# Patient Record
Sex: FEMALE | Race: BLACK OR AFRICAN AMERICAN | Employment: FULL TIME | URBAN - METROPOLITAN AREA
[De-identification: names, ages, dates, MRNs, and addresses within clinical notes are randomized per-mention and may not be internally consistent; named-entity substitution may affect disease eponyms.]

---

## 2017-01-08 ENCOUNTER — APPOINTMENT (OUTPATIENT)
Dept: GENERAL RADIOLOGY | Age: 37
DRG: 392 | End: 2017-01-08
Attending: EMERGENCY MEDICINE
Payer: SELF-PAY

## 2017-01-08 ENCOUNTER — HOSPITAL ENCOUNTER (INPATIENT)
Age: 37
LOS: 2 days | Discharge: HOME | DRG: 392 | End: 2017-01-11
Attending: EMERGENCY MEDICINE | Admitting: FAMILY MEDICINE
Payer: SELF-PAY

## 2017-01-08 DIAGNOSIS — R10.13 ABDOMINAL PAIN, EPIGASTRIC: Primary | ICD-10-CM

## 2017-01-08 DIAGNOSIS — R07.89 ATYPICAL CHEST PAIN: ICD-10-CM

## 2017-01-08 DIAGNOSIS — R11.2 NAUSEA AND VOMITING, INTRACTABILITY OF VOMITING NOT SPECIFIED, UNSPECIFIED VOMITING TYPE: ICD-10-CM

## 2017-01-08 DIAGNOSIS — F10.930 WITHDRAWAL SYMPTOMS, ALCOHOL, UNCOMPLICATED (HCC): ICD-10-CM

## 2017-01-08 LAB
ALBUMIN SERPL BCP-MCNC: 4 G/DL (ref 3.5–5)
ALBUMIN/GLOB SERPL: 0.8 {RATIO} (ref 1.1–2.2)
ALP SERPL-CCNC: 128 U/L (ref 45–117)
ALT SERPL-CCNC: 43 U/L (ref 12–78)
ANION GAP BLD CALC-SCNC: 18 MMOL/L (ref 5–15)
APPEARANCE UR: ABNORMAL
AST SERPL W P-5'-P-CCNC: 73 U/L (ref 15–37)
BACTERIA URNS QL MICRO: ABNORMAL /HPF
BASOPHILS # BLD AUTO: 0 K/UL (ref 0–0.1)
BASOPHILS # BLD: 0 % (ref 0–1)
BILIRUB SERPL-MCNC: 0.5 MG/DL (ref 0.2–1)
BILIRUB UR QL CFM: NEGATIVE
BUN SERPL-MCNC: 8 MG/DL (ref 6–20)
BUN/CREAT SERPL: 13 (ref 12–20)
CALCIUM SERPL-MCNC: 9.1 MG/DL (ref 8.5–10.1)
CHLORIDE SERPL-SCNC: 100 MMOL/L (ref 97–108)
CK SERPL-CCNC: 105 U/L (ref 26–192)
CO2 SERPL-SCNC: 22 MMOL/L (ref 21–32)
COLOR UR: ABNORMAL
CREAT SERPL-MCNC: 0.62 MG/DL (ref 0.55–1.02)
EOSINOPHIL # BLD: 0.1 K/UL (ref 0–0.4)
EOSINOPHIL NFR BLD: 1 % (ref 0–7)
EPITH CASTS URNS QL MICRO: ABNORMAL /LPF
ERYTHROCYTE [DISTWIDTH] IN BLOOD BY AUTOMATED COUNT: 14.2 % (ref 11.5–14.5)
GLOBULIN SER CALC-MCNC: 4.8 G/DL (ref 2–4)
GLUCOSE SERPL-MCNC: 52 MG/DL (ref 65–100)
GLUCOSE UR STRIP.AUTO-MCNC: NEGATIVE MG/DL
HCG UR QL: NEGATIVE
HCT VFR BLD AUTO: 38.1 % (ref 35–47)
HGB BLD-MCNC: 13.3 G/DL (ref 11.5–16)
HGB UR QL STRIP: ABNORMAL
HYALINE CASTS URNS QL MICRO: ABNORMAL /LPF (ref 0–5)
KETONES UR QL STRIP.AUTO: 40 MG/DL
LACTATE SERPL-SCNC: 2.2 MMOL/L (ref 0.4–2)
LEUKOCYTE ESTERASE UR QL STRIP.AUTO: NEGATIVE
LIPASE SERPL-CCNC: 275 U/L (ref 73–393)
LYMPHOCYTES # BLD AUTO: 20 % (ref 12–49)
LYMPHOCYTES # BLD: 1.2 K/UL (ref 0.8–3.5)
MAGNESIUM SERPL-MCNC: 1.5 MG/DL (ref 1.6–2.4)
MCH RBC QN AUTO: 33.3 PG (ref 26–34)
MCHC RBC AUTO-ENTMCNC: 34.9 G/DL (ref 30–36.5)
MCV RBC AUTO: 95.5 FL (ref 80–99)
MONOCYTES # BLD: 0.3 K/UL (ref 0–1)
MONOCYTES NFR BLD AUTO: 5 % (ref 5–13)
NEUTS SEG # BLD: 4.4 K/UL (ref 1.8–8)
NEUTS SEG NFR BLD AUTO: 74 % (ref 32–75)
NITRITE UR QL STRIP.AUTO: NEGATIVE
PH UR STRIP: 6 [PH] (ref 5–8)
PLATELET # BLD AUTO: 224 K/UL (ref 150–400)
POTASSIUM SERPL-SCNC: 3.6 MMOL/L (ref 3.5–5.1)
PROT SERPL-MCNC: 8.8 G/DL (ref 6.4–8.2)
PROT UR STRIP-MCNC: ABNORMAL MG/DL
RBC # BLD AUTO: 3.99 M/UL (ref 3.8–5.2)
RBC #/AREA URNS HPF: ABNORMAL /HPF (ref 0–5)
SODIUM SERPL-SCNC: 140 MMOL/L (ref 136–145)
SP GR UR REFRACTOMETRY: 1.02 (ref 1–1.03)
TROPONIN I SERPL-MCNC: <0.04 NG/ML
UA: UC IF INDICATED,UAUC: ABNORMAL
UROBILINOGEN UR QL STRIP.AUTO: 1 EU/DL (ref 0.2–1)
WBC # BLD AUTO: 6 K/UL (ref 3.6–11)
WBC URNS QL MICRO: ABNORMAL /HPF (ref 0–4)

## 2017-01-08 PROCEDURE — 85025 COMPLETE CBC W/AUTO DIFF WBC: CPT | Performed by: EMERGENCY MEDICINE

## 2017-01-08 PROCEDURE — 87086 URINE CULTURE/COLONY COUNT: CPT | Performed by: EMERGENCY MEDICINE

## 2017-01-08 PROCEDURE — 87077 CULTURE AEROBIC IDENTIFY: CPT | Performed by: EMERGENCY MEDICINE

## 2017-01-08 PROCEDURE — 82550 ASSAY OF CK (CPK): CPT | Performed by: EMERGENCY MEDICINE

## 2017-01-08 PROCEDURE — 83605 ASSAY OF LACTIC ACID: CPT | Performed by: EMERGENCY MEDICINE

## 2017-01-08 PROCEDURE — 80307 DRUG TEST PRSMV CHEM ANLYZR: CPT | Performed by: EMERGENCY MEDICINE

## 2017-01-08 PROCEDURE — 93005 ELECTROCARDIOGRAM TRACING: CPT

## 2017-01-08 PROCEDURE — 81001 URINALYSIS AUTO W/SCOPE: CPT | Performed by: EMERGENCY MEDICINE

## 2017-01-08 PROCEDURE — 96375 TX/PRO/DX INJ NEW DRUG ADDON: CPT

## 2017-01-08 PROCEDURE — 84484 ASSAY OF TROPONIN QUANT: CPT | Performed by: EMERGENCY MEDICINE

## 2017-01-08 PROCEDURE — 96374 THER/PROPH/DIAG INJ IV PUSH: CPT

## 2017-01-08 PROCEDURE — 96376 TX/PRO/DX INJ SAME DRUG ADON: CPT

## 2017-01-08 PROCEDURE — 83735 ASSAY OF MAGNESIUM: CPT | Performed by: EMERGENCY MEDICINE

## 2017-01-08 PROCEDURE — 87186 SC STD MICRODIL/AGAR DIL: CPT | Performed by: EMERGENCY MEDICINE

## 2017-01-08 PROCEDURE — 71020 XR CHEST PA LAT: CPT

## 2017-01-08 PROCEDURE — 83690 ASSAY OF LIPASE: CPT | Performed by: EMERGENCY MEDICINE

## 2017-01-08 PROCEDURE — 81025 URINE PREGNANCY TEST: CPT | Performed by: EMERGENCY MEDICINE

## 2017-01-08 PROCEDURE — 80053 COMPREHEN METABOLIC PANEL: CPT | Performed by: EMERGENCY MEDICINE

## 2017-01-08 PROCEDURE — 74011250636 HC RX REV CODE- 250/636: Performed by: EMERGENCY MEDICINE

## 2017-01-08 PROCEDURE — 94762 N-INVAS EAR/PLS OXIMTRY CONT: CPT

## 2017-01-08 PROCEDURE — 99285 EMERGENCY DEPT VISIT HI MDM: CPT

## 2017-01-08 PROCEDURE — 36415 COLL VENOUS BLD VENIPUNCTURE: CPT | Performed by: EMERGENCY MEDICINE

## 2017-01-08 RX ORDER — ONDANSETRON 2 MG/ML
4 INJECTION INTRAMUSCULAR; INTRAVENOUS
Status: COMPLETED | OUTPATIENT
Start: 2017-01-08 | End: 2017-01-08

## 2017-01-08 RX ORDER — LORAZEPAM 0.5 MG/1
1 TABLET ORAL EVERY 8 HOURS
Qty: 15 TAB | Refills: 0 | Status: SHIPPED | OUTPATIENT
Start: 2017-01-08 | End: 2017-01-11

## 2017-01-08 RX ORDER — HYDROMORPHONE HYDROCHLORIDE 2 MG/ML
1 INJECTION, SOLUTION INTRAMUSCULAR; INTRAVENOUS; SUBCUTANEOUS ONCE
Status: DISCONTINUED | OUTPATIENT
Start: 2017-01-08 | End: 2017-01-08

## 2017-01-08 RX ORDER — LORAZEPAM 2 MG/ML
1 INJECTION INTRAMUSCULAR
Status: COMPLETED | OUTPATIENT
Start: 2017-01-08 | End: 2017-01-08

## 2017-01-08 RX ORDER — HYDROMORPHONE HYDROCHLORIDE 1 MG/ML
1 INJECTION, SOLUTION INTRAMUSCULAR; INTRAVENOUS; SUBCUTANEOUS
Status: COMPLETED | OUTPATIENT
Start: 2017-01-08 | End: 2017-01-08

## 2017-01-08 RX ORDER — PROMETHAZINE HYDROCHLORIDE 50 MG/1
50 TABLET ORAL
Qty: 12 TAB | Refills: 0 | Status: SHIPPED | OUTPATIENT
Start: 2017-01-08 | End: 2017-01-11

## 2017-01-08 RX ORDER — PROCHLORPERAZINE EDISYLATE 5 MG/ML
5 INJECTION INTRAMUSCULAR; INTRAVENOUS ONCE
Status: COMPLETED | OUTPATIENT
Start: 2017-01-08 | End: 2017-01-08

## 2017-01-08 RX ORDER — HYDROMORPHONE HYDROCHLORIDE 1 MG/ML
1 INJECTION, SOLUTION INTRAMUSCULAR; INTRAVENOUS; SUBCUTANEOUS ONCE
Status: COMPLETED | OUTPATIENT
Start: 2017-01-08 | End: 2017-01-08

## 2017-01-08 RX ORDER — MORPHINE SULFATE 4 MG/ML
4 INJECTION, SOLUTION INTRAMUSCULAR; INTRAVENOUS
Status: COMPLETED | OUTPATIENT
Start: 2017-01-08 | End: 2017-01-08

## 2017-01-08 RX ADMIN — LORAZEPAM 1 MG: 2 INJECTION INTRAMUSCULAR; INTRAVENOUS at 19:43

## 2017-01-08 RX ADMIN — LORAZEPAM 1 MG: 2 INJECTION INTRAMUSCULAR; INTRAVENOUS at 21:18

## 2017-01-08 RX ADMIN — HYDROMORPHONE HYDROCHLORIDE 1 MG: 1 INJECTION, SOLUTION INTRAMUSCULAR; INTRAVENOUS; SUBCUTANEOUS at 23:10

## 2017-01-08 RX ADMIN — PROCHLORPERAZINE EDISYLATE 5 MG: 5 INJECTION INTRAMUSCULAR; INTRAVENOUS at 23:04

## 2017-01-08 RX ADMIN — HYDROMORPHONE HYDROCHLORIDE 1 MG: 1 INJECTION, SOLUTION INTRAMUSCULAR; INTRAVENOUS; SUBCUTANEOUS at 21:17

## 2017-01-08 RX ADMIN — MORPHINE SULFATE 4 MG: 4 INJECTION, SOLUTION INTRAMUSCULAR; INTRAVENOUS at 19:39

## 2017-01-08 RX ADMIN — ONDANSETRON 4 MG: 2 INJECTION INTRAMUSCULAR; INTRAVENOUS at 21:17

## 2017-01-08 RX ADMIN — ONDANSETRON 4 MG: 2 INJECTION INTRAMUSCULAR; INTRAVENOUS at 19:39

## 2017-01-08 RX ADMIN — SODIUM CHLORIDE 1000 ML: 900 INJECTION, SOLUTION INTRAVENOUS at 19:48

## 2017-01-08 NOTE — IP AVS SNAPSHOT
Current Discharge Medication List  
  
Take these medications at their scheduled times Dose & Instructions Dispensing Information Comments Morning Noon Evening Bedtime  
 ciprofloxacin HCl 250 mg tablet Commonly known as:  CIPRO Your next dose is: Today, Tomorrow Other:  ____________ Dose:  250 mg Take 1 Tab by mouth every twelve (12) hours for 1 day. Quantity:  2 Tab Refills:  0 LORazepam 0.5 mg tablet Commonly known as:  ATIVAN Your next dose is: Today, Tomorrow Other:  ____________ Dose:  1 mg Take 2 Tabs by mouth every eight (8) hours. Max Daily Amount: 3 mg. Quantity:  15 Tab Refills:  0  
     
   
   
   
  
 multivitamin tablet Commonly known as:  ONE A DAY Your next dose is: Today, Tomorrow Other:  ____________ Dose:  1 Tab Take 1 Tab by mouth daily. Refills:  0 Omeprazole delayed release 20 mg tablet Commonly known as:  PRILOSEC D/R Your next dose is: Today, Tomorrow Other:  ____________ Dose:  20 mg Take 1 Tab by mouth daily for 30 days. Quantity:  30 Tab Refills:  0 Take these medications as needed Dose & Instructions Dispensing Information Comments Morning Noon Evening Bedtime  
 oxyCODONE-acetaminophen 5-325 mg per tablet Commonly known as:  PERCOCET Your next dose is: Today, Tomorrow Other:  ____________ Dose:  1 Tab Take 1 Tab by mouth every four (4) hours as needed for Pain. Max Daily Amount: 6 Tabs. Quantity:  10 Tab Refills:  0  
     
   
   
   
  
 promethazine 50 mg tablet Commonly known as:  PHENERGAN Your next dose is: Today, Tomorrow Other:  ____________ Dose:  50 mg Take 1 Tab by mouth every six (6) hours as needed for Nausea. Quantity:  12 Tab Refills:  0 Where to Get Your Medications Information about where to get these medications is not yet available ! Ask your nurse or doctor about these medications  
  ciprofloxacin HCl 250 mg tablet LORazepam 0.5 mg tablet Omeprazole delayed release 20 mg tablet  
 oxyCODONE-acetaminophen 5-325 mg per tablet  
 promethazine 50 mg tablet

## 2017-01-08 NOTE — ED NOTES
Pt placed in hallway near triage rooms, for closer observation while waiting for an ED room assignment.

## 2017-01-08 NOTE — IP AVS SNAPSHOT
[address] [facility] 
[phone] Patient: Evon Freire MRN: [id number] :[id number] You are allergic to the following Allergen Reactions Codeine Hives Penicillins Hives Recent Documentation Height Weight BMI OB Status Smoking Status 1.651 m 76.9 kg 28.21 kg/m2 Having regular periods Current Every Day Smoker Emergency Contacts Name Discharge Info Relation Home Work Mobile Tita Flores  Parent [1] [phone] About your hospitalization You were admitted on:  2017 You last received care in the:  [facility] 2 GENERAL SURGERY You were discharged on:  2017 Unit phone number:  [phone] Why you were hospitalized Your primary diagnosis was:  Not on File Your diagnoses also included:  Abdominal Pain Providers Seen During Your Hospitalizations Provider Role Specialty Primary office phone Carline Ordoñez MD Attending Provider Emergency Medicine [phone] Kim Jean MD Attending Provider Internal Medicine [phone] Your Primary Care Physician (PCP) Primary Care Physician Office Phone Office Fax NONE ** None ** ** None ** Follow-up Information Follow up With Details Comments Contact Info [facility] EMERGENCY DEPT  If symptoms worsen, As needed [address] [facility] 
[phone] [facility] Department Of Gastroenterology   [address] 
[address] [place] [address] 
[phone] None   None ([phone]) Patient stated that they have no PCP Current Discharge Medication List  
  
START taking these medications Dose & Instructions Dispensing Information Comments Morning Noon Evening Bedtime  
 ciprofloxacin HCl 250 mg tablet Commonly known as:  CIPRO Your next dose is: Today, Tomorrow Other:  _________ Dose:  250 mg Take 1 Tab by mouth every twelve (12) hours for 1 day. Quantity:  2 Tab Refills:  0 LORazepam 0.5 mg tablet Commonly known as:  ATIVAN Your next dose is: Today, Tomorrow Other:  _________ Dose:  1 mg Take 2 Tabs by mouth every eight (8) hours. Max Daily Amount: 3 mg. Quantity:  15 Tab Refills:  0 Omeprazole delayed release 20 mg tablet Commonly known as:  PRILOSEC D/R Your next dose is: Today, Tomorrow Other:  _________ Dose:  20 mg Take 1 Tab by mouth daily for 30 days. Quantity:  30 Tab Refills:  0  
     
   
   
   
  
 oxyCODONE-acetaminophen 5-325 mg per tablet Commonly known as:  PERCOCET Your next dose is: Today, Tomorrow Other:  _________ Dose:  1 Tab Take 1 Tab by mouth every four (4) hours as needed for Pain. Max Daily Amount: 6 Tabs. Quantity:  10 Tab Refills:  0  
     
   
   
   
  
 promethazine 50 mg tablet Commonly known as:  PHENERGAN Your next dose is: Today, Tomorrow Other:  _________ Dose:  50 mg Take 1 Tab by mouth every six (6) hours as needed for Nausea. Quantity:  12 Tab Refills:  0 CONTINUE these medications which have NOT CHANGED Dose & Instructions Dispensing Information Comments Morning Noon Evening Bedtime  
 multivitamin tablet Commonly known as:  ONE A DAY Your next dose is: Today, Tomorrow Other:  _________ Dose:  1 Tab Take 1 Tab by mouth daily. Refills:  0 STOP taking these medications   
 magnesium 250 mg Tab  
   
  
 ondansetron 4 mg disintegrating tablet Commonly known as:  ZOFRAN ODT Where to Get Your Medications Information on where to get these meds will be given to you by the nurse or doctor. ! Ask your nurse or doctor about these medications ciprofloxacin HCl 250 mg tablet LORazepam 0.5 mg tablet Omeprazole delayed release 20 mg tablet  
 oxyCODONE-acetaminophen 5-325 mg per tablet  
 promethazine 50 mg tablet Discharge Instructions Abdominal Pain: Care Instructions Your Care Instructions Abdominal pain has many possible causes. Some aren't serious and get better on their own in a few days. Others need more testing and treatment. If your pain continues or gets worse, you need to be rechecked and may need more tests to find out what is wrong. You may need surgery to correct the problem. Don't ignore new symptoms, such as fever, nausea and vomiting, urination problems, pain that gets worse, and dizziness. These may be signs of a more serious problem. Your doctor may have recommended a follow-up visit in the next 8 to 12 hours. If you are not getting better, you may need more tests or treatment. The doctor has checked you carefully, but problems can develop later. If you notice any problems or new symptoms, get medical treatment right away. Follow-up care is a key part of your treatment and safety. Be sure to make and go to all appointments, and call your doctor if you are having problems. It's also a good idea to know your test results and keep a list of the medicines you take. How can you care for yourself at home? · Rest until you feel better. · To prevent dehydration, drink plenty of fluids, enough so that your urine is light yellow or clear like water. Choose water and other caffeine-free clear liquids until you feel better. If you have kidney, heart, or liver disease and have to limit fluids, talk with your doctor before you increase the amount of fluids you drink. · If your stomach is upset, eat mild foods, such as rice, dry toast or crackers, bananas, and applesauce. Try eating several small meals instead of two or three large ones. · Wait until 48 hours after all symptoms have gone away before you have spicy foods, alcohol, and drinks that contain caffeine. · Do not eat foods that are high in fat. · Avoid anti-inflammatory medicines such as aspirin, ibuprofen (Advil, Motrin), and naproxen (Aleve). These can cause stomach upset. Talk to your doctor if you take daily aspirin for another health problem. When should you call for help? Call 911 anytime you think you may need emergency care. For example, call if: 
· You passed out (lost consciousness). · You pass maroon or very bloody stools. · You vomit blood or what looks like coffee grounds. · You have new, severe belly pain. Call your doctor now or seek immediate medical care if: 
· Your pain gets worse, especially if it becomes focused in one area of your belly. · You have a new or higher fever. · Your stools are black and look like tar, or they have streaks of blood. · You have unexpected vaginal bleeding. · You have symptoms of a urinary tract infection. These may include: 
¨ Pain when you urinate. ¨ Urinating more often than usual. 
¨ Blood in your urine. · You are dizzy or lightheaded, or you feel like you may faint. Watch closely for changes in your health, and be sure to contact your doctor if: 
· You are not getting better after 1 day (24 hours). Where can you learn more? Go to http://willy-yenny.info/. Enter S241 in the search box to learn more about \"Abdominal Pain: Care Instructions. \" Current as of: May 27, 2016 Content Version: 11.1 © 4360-6791 [employer]. Care instructions adapted under license by [employer] (which disclaims liability or warranty for this information). If you have questions about a medical condition or this instruction, always ask your healthcare professional. [address] any warranty or liability for your use of this information. Nausea and Vomiting: Care Instructions Your Care Instructions When you are nauseated, you may feel weak and sweaty and notice a lot of saliva in your mouth. Nausea often leads to vomiting. Most of the time you do not need to worry about nausea and vomiting, but they can be signs of other illnesses. Two common causes of nausea and vomiting are stomach flu and food poisoning. Nausea and vomiting from viral stomach flu will usually start to improve within 24 hours. Nausea and vomiting from food poisoning may last from 12 to 48 hours. The doctor has checked you carefully, but problems can develop later. If you notice any problems or new symptoms, get medical treatment right away. Follow-up care is a key part of your treatment and safety. Be sure to make and go to all appointments, and call your doctor if you are having problems. It's also a good idea to know your test results and keep a list of the medicines you take. How can you care for yourself at home? · To prevent dehydration, drink plenty of fluids, enough so that your urine is light yellow or clear like water. Choose water and other caffeine-free clear liquids until you feel better. If you have kidney, heart, or liver disease and have to limit fluids, talk with your doctor before you increase the amount of fluids you drink. · Rest in bed until you feel better. · When you are able to eat, try clear soups, mild foods, and liquids until all symptoms are gone for 12 to 48 hours. Other good choices include dry toast, crackers, cooked cereal, and gelatin dessert, such as Jell-O. When should you call for help? Call 911 anytime you think you may need emergency care. For example, call if: 
· You passed out (lost consciousness). Call your doctor now or seek immediate medical care if: 
· You have symptoms of dehydration, such as: ¨ Dry eyes and a dry mouth. ¨ Passing only a little dark urine. ¨ Feeling thirstier than usual. 
· You have new or worsening belly pain. · You have a new or higher fever. · You vomit blood or what looks like coffee grounds. Watch closely for changes in your health, and be sure to contact your doctor if: 
· You have ongoing nausea and vomiting. · Your vomiting is getting worse. · Your vomiting lasts longer than 2 days. · You are not getting better as expected. Where can you learn more? Go to http://willy-yenny.info/. Enter 25 [id number] in the search box to learn more about \"Nausea and Vomiting: Care Instructions. \" Current as of: May 27, 2016 Content Version: 11.1 © 0914-8064 [employer]. Care instructions Chest Pain: Care Instructions Your Care Instructions There are many things that can cause chest pain. Some are not serious and will get better on their own in a few days. But some kinds of chest pain need more testing and treatment. Your doctor may have recommended a follow-up visit in the next 8 to 12 hours. If you are not getting better, you may need more tests or treatment. Even though your doctor has released you, you still need to watch for any problems. The doctor carefully checked you, but sometimes problems can develop later. If you have new symptoms or if your symptoms do not get better, get medical care right away. If you have worse or different chest pain or pressure that lasts more than 5 minutes or you passed out (lost consciousness), call 911 or seek other emergency help right away. A medical visit is only one step in your treatment. Even if you feel better, you still need to do what your doctor recommends, such as going to all suggested follow-up appointments and taking medicines exactly as directed. This will help you recover and help prevent future problems. How can you care for yourself at home? · Rest until you feel better. · Take your medicine exactly as prescribed. Call your doctor if you think you are having a problem with your medicine. · Do not drive after taking a prescription pain medicine. When should you call for help? Call 911 if: 
· You passed out (lost consciousness). · You have severe difficulty breathing. · You have symptoms of a heart attack. These may include: ¨ Chest pain or pressure, or a strange feeling in your chest. 
¨ Sweating. ¨ Shortness of breath. ¨ Nausea or vomiting. ¨ Pain, pressure, or a strange feeling in your back, neck, jaw, or upper belly or in one or both shoulders or arms. ¨ Lightheadedness or sudden weakness. ¨ A fast or irregular heartbeat. After you call 911, the  may tell you to chew 1 adult-strength or 2 to 4 low-dose aspirin. Wait for an ambulance. Do not try to drive yourself. Call your doctor today if: 
· You have any trouble breathing. · Your chest pain gets worse. · You are dizzy or lightheaded, or you feel like you may faint. · You are not getting better as expected. · You are having new or different chest pain. Where can you learn more? Go to http://iwlly-yenny.info/. Enter A120 in the search box to learn more about \"Chest Pain: Care Instructions. \" Current as of: May 27, 2016 Content Version: 11.1 © 9131-0854 [employer]. Care instructions adapted under license by [employer] (which disclaims liability or warranty for this information). If you have questions about a medical condition or this instruction, always ask your healthcare professional. [address] any warranty or liability for your use of this information. adapted under license by [employer] (which disclaims liability or warranty for this information). If you have questions about a medical condition or this instruction, always ask your healthcare professional. [address] any warranty or liability for your use of this information. Discharge Orders None [facility] Announcement We are excited to announce that we are making your provider's discharge notes available to you in [employer]. You will see these notes when they are completed and signed by the physician that discharged you from your recent hospital stay. If you have any questions or concerns about any information you see in [employer], please call the Health Information Department where you were seen or reach out to your Primary Care Provider for more information about your plan of care. Introducing hospitals & HEALTH SERVICES! [employer] introduces [employer] patient portal. Now you can access parts of your medical record, email your doctor's office, and request medication refills online. 1. In your internet browser, go to [email or web address] 2. Click on the First Time User? Click Here link in the Sign In box. You will see the New Member Sign Up page. 3. Enter your [employer] Access Code exactly as it appears below. You will not need to use this code after youve completed the sign-up process. If you do not sign up before the expiration date, you must request a new code. · [employer] Access Code: [email or web address] Expires: 4/8/2017  6:41 PM 
 
4. Enter the last four digits of your Social Security Number (xxxx) and Date of Birth (mm/dd/yyyy) as indicated and click Submit. You will be taken to the next sign-up page. 5. Create a [employer] ID. This will be your [employer] login ID and cannot be changed, so think of one that is secure and easy to remember. 6. Create a [employer] password. You can change your password at any time. 7. Enter your Password Reset Question and Answer. This can be used at a later time if you forget your password. 8. Enter your e-mail address. You will receive e-mail notification when new information is available in [address] 9. Click Sign Up. You can now view and download portions of your medical record.  
10. Click the Download Summary menu link to download a portable copy of your medical information. If you have questions, please visit the Frequently Asked Questions section of the [employer] website. Remember, MyChart is NOT to be used for urgent needs. For medical emergencies, dial 911. Now available from your iPhone and Android! General Information Please provide this summary of care documentation to your next provider. Patient Signature:  ____________________________________________________________ Date:  ____________________________________________________________  
  
[facility] Provider Signature:  ____________________________________________________________ Date:  ____________________________________________________________

## 2017-01-08 NOTE — IP AVS SNAPSHOT
[address] [facility] 
[phone] Patient: Ajay Edwards MRN: [id number] :1980 You are allergic to the following Allergen Reactions Codeine Hives Penicillins Hives Recent Documentation Height Weight BMI OB Status Smoking Status 1.651 m 76.9 kg 28.21 kg/m2 Having regular periods Current Every Day Smoker Emergency Contacts Name Discharge Info Relation Home Work Mobile Claudine Calderon  Parent [1] [phone] About your hospitalization You were admitted on:  2017 You last received care in the:  [facility] 2 GENERAL SURGERY You were discharged on:  2017 Why you were hospitalized Your primary diagnosis was:  Not on File Your diagnoses also included:  Abdominal Pain Providers Seen During Your Hospitalizations Provider Role Specialty Primary office phone Dilma Alexander MD Attending Provider Emergency Medicine [phone] Dania Gilman MD Attending Provider Internal Medicine [phone] Your Primary Care Physician (PCP) Primary Care Physician Office Phone Office Fax NONE ** None ** ** None ** Follow-up Information Follow up With Details Comments Contact Info [facility] EMERGENCY DEPT  If symptoms worsen, As needed [address] [place] [address] 
[phone] [facility]   [address] 
[address] 4 [place] [address] 
[phone] None   None ([phone]) Patient stated that they have no PCP Current Discharge Medication List  
  
START taking these medications Dose & Instructions Dispensing Information Comments Morning Noon Evening Bedtime  
 ciprofloxacin HCl 250 mg tablet Commonly known as:  CIPRO Your next dose is: Today, Tomorrow Other:  _________  Dose:  250 mg  
 Take 1 Tab by mouth every twelve (12) hours for 1 day. Quantity:  2 Tab Refills:  0 LORazepam 0.5 mg tablet Commonly known as:  ATIVAN Your next dose is: Today, Tomorrow Other:  _________ Dose:  1 mg Take 2 Tabs by mouth every eight (8) hours. Max Daily Amount: 3 mg. Quantity:  15 Tab Refills:  0 Omeprazole delayed release 20 mg tablet Commonly known as:  PRILOSEC D/R Your next dose is: Today, Tomorrow Other:  _________ Dose:  20 mg Take 1 Tab by mouth daily for 30 days. Quantity:  30 Tab Refills:  0  
     
   
   
   
  
 oxyCODONE-acetaminophen 5-325 mg per tablet Commonly known as:  PERCOCET Your next dose is: Today, Tomorrow Other:  _________ Dose:  1 Tab Take 1 Tab by mouth every four (4) hours as needed for Pain. Max Daily Amount: 6 Tabs. Quantity:  10 Tab Refills:  0  
     
   
   
   
  
 promethazine 50 mg tablet Commonly known as:  PHENERGAN Your next dose is: Today, Tomorrow Other:  _________ Dose:  50 mg Take 1 Tab by mouth every six (6) hours as needed for Nausea. Quantity:  12 Tab Refills:  0 CONTINUE these medications which have NOT CHANGED Dose & Instructions Dispensing Information Comments Morning Noon Evening Bedtime  
 multivitamin tablet Commonly known as:  ONE A DAY Your next dose is: Today, Tomorrow Other:  _________ Dose:  1 Tab Take 1 Tab by mouth daily. Refills:  0 STOP taking these medications   
 magnesium 250 mg Tab  
   
  
 ondansetron 4 mg disintegrating tablet Commonly known as:  ZOFRAN ODT Where to Get Your Medications Information on where to get these meds will be given to you by the nurse or doctor. ! Ask your nurse or doctor about these medications ciprofloxacin HCl 250 mg tablet LORazepam 0.5 mg tablet Omeprazole delayed release 20 mg tablet  
 oxyCODONE-acetaminophen 5-325 mg per tablet  
 promethazine 50 mg tablet Discharge Instructions Abdominal Pain: Care Instructions Your Care Instructions Abdominal pain has many possible causes. Some aren't serious and get better on their own in a few days. Others need more testing and treatment. If your pain continues or gets worse, you need to be rechecked and may need more tests to find out what is wrong. You may need surgery to correct the problem. Don't ignore new symptoms, such as fever, nausea and vomiting, urination problems, pain that gets worse, and dizziness. These may be signs of a more serious problem. Your doctor may have recommended a follow-up visit in the next 8 to 12 hours. If you are not getting better, you may need more tests or treatment. The doctor has checked you carefully, but problems can develop later. If you notice any problems or new symptoms, get medical treatment right away. Follow-up care is a key part of your treatment and safety. Be sure to make and go to all appointments, and call your doctor if you are having problems. It's also a good idea to know your test results and keep a list of the medicines you take. How can you care for yourself at home? · Rest until you feel better. · To prevent dehydration, drink plenty of fluids, enough so that your urine is light yellow or clear like water. Choose water and other caffeine-free clear liquids until you feel better. If you have kidney, heart, or liver disease and have to limit fluids, talk with your doctor before you increase the amount of fluids you drink. · If your stomach is upset, eat mild foods, such as rice, dry toast or crackers, bananas, and applesauce. Try eating several small meals instead of two or three large ones. · Wait until 48 hours after all symptoms have gone away before you have spicy foods, alcohol, and drinks that contain caffeine. · Do not eat foods that are high in fat. · Avoid anti-inflammatory medicines such as aspirin, ibuprofen (Advil, Motrin), and naproxen (Aleve). These can cause stomach upset. Talk to your doctor if you take daily aspirin for another health problem. When should you call for help? Call 911 anytime you think you may need emergency care. For example, call if: 
· You passed out (lost consciousness). · You pass maroon or very bloody stools. · You vomit blood or what looks like coffee grounds. · You have new, severe belly pain. Call your doctor now or seek immediate medical care if: 
· Your pain gets worse, especially if it becomes focused in one area of your belly. · You have a new or higher fever. · Your stools are black and look like tar, or they have streaks of blood. · You have unexpected vaginal bleeding. · You have symptoms of a urinary tract infection. These may include: 
¨ Pain when you urinate. ¨ Urinating more often than usual. 
¨ Blood in your urine. · You are dizzy or lightheaded, or you feel like you may faint. Watch closely for changes in your health, and be sure to contact your doctor if: 
· You are not getting better after 1 day (24 hours). Where can you learn more? Go to http://willy-yenny.info/. Enter I721 in the search box to learn more about \"Abdominal Pain: Care Instructions. \" Current as of: May 27, 2016 Content Version: 11.1 © 6374-9650 [employer]. Care instructions adapted under license by [employer] (which disclaims liability or warranty for this information). If you have questions about a medical condition or this instruction, always ask your healthcare professional. [address] any warranty or liability for your use of this information. Nausea and Vomiting: Care Instructions Your Care Instructions When you are nauseated, you may feel weak and sweaty and notice a lot of saliva in your mouth. Nausea often leads to vomiting. Most of the time you do not need to worry about nausea and vomiting, but they can be signs of other illnesses. Two common causes of nausea and vomiting are stomach flu and food poisoning. Nausea and vomiting from viral stomach flu will usually start to improve within 24 hours. Nausea and vomiting from food poisoning may last from 12 to 48 hours. The doctor has checked you carefully, but problems can develop later. If you notice any problems or new symptoms, get medical treatment right away. Follow-up care is a key part of your treatment and safety. Be sure to make and go to all appointments, and call your doctor if you are having problems. It's also a good idea to know your test results and keep a list of the medicines you take. How can you care for yourself at home? · To prevent dehydration, drink plenty of fluids, enough so that your urine is light yellow or clear like water. Choose water and other caffeine-free clear liquids until you feel better. If you have kidney, heart, or liver disease and have to limit fluids, talk with your doctor before you increase the amount of fluids you drink. · Rest in bed until you feel better. · When you are able to eat, try clear soups, mild foods, and liquids until all symptoms are gone for 12 to 48 hours. Other good choices include dry toast, crackers, cooked cereal, and gelatin dessert, such as Jell-O. When should you call for help? Call 911 anytime you think you may need emergency care. For example, call if: 
· You passed out (lost consciousness). Call your doctor now or seek immediate medical care if: 
· You have symptoms of dehydration, such as: ¨ Dry eyes and a dry mouth. ¨ Passing only a little dark urine. ¨ Feeling thirstier than usual. 
· You have new or worsening belly pain. · You have a new or higher fever. · You vomit blood or what looks like coffee grounds. Watch closely for changes in your health, and be sure to contact your doctor if: 
· You have ongoing nausea and vomiting. · Your vomiting is getting worse. · Your vomiting lasts longer than 2 days. · You are not getting better as expected. Where can you learn more? Go to http://willy-yenny.info/. Enter 25 739060 in the search box to learn more about \"Nausea and Vomiting: Care Instructions. \" Current as of: May 27, 2016 Content Version: 11.1 © 8316-3732 [employer]. Care instructions Chest Pain: Care Instructions Your Care Instructions There are many things that can cause chest pain. Some are not serious and will get better on their own in a few days. But some kinds of chest pain need more testing and treatment. Your doctor may have recommended a follow-up visit in the next 8 to 12 hours. If you are not getting better, you may need more tests or treatment. Even though your doctor has released you, you still need to watch for any problems. The doctor carefully checked you, but sometimes problems can develop later. If you have new symptoms or if your symptoms do not get better, get medical care right away. If you have worse or different chest pain or pressure that lasts more than 5 minutes or you passed out (lost consciousness), call 911 or seek other emergency help right away. A medical visit is only one step in your treatment. Even if you feel better, you still need to do what your doctor recommends, such as going to all suggested follow-up appointments and taking medicines exactly as directed. This will help you recover and help prevent future problems. How can you care for yourself at home? · Rest until you feel better. · Take your medicine exactly as prescribed. Call your doctor if you think you are having a problem with your medicine. · Do not drive after taking a prescription pain medicine. When should you call for help? Call 911 if: 
· You passed out (lost consciousness). · You have severe difficulty breathing. · You have symptoms of a heart attack. These may include: ¨ Chest pain or pressure, or a strange feeling in your chest. 
¨ Sweating. ¨ Shortness of breath. ¨ Nausea or vomiting. ¨ Pain, pressure, or a strange feeling in your back, neck, jaw, or upper belly or in one or both shoulders or arms. ¨ Lightheadedness or sudden weakness. ¨ A fast or irregular heartbeat. After you call 911, the  may tell you to chew 1 adult-strength or 2 to 4 low-dose aspirin. Wait for an ambulance. Do not try to drive yourself. Call your doctor today if: 
· You have any trouble breathing. · Your chest pain gets worse. · You are dizzy or lightheaded, or you feel like you may faint. · You are not getting better as expected. · You are having new or different chest pain. Where can you learn more? Go to http://willy-yenny.info/. Enter A120 in the search box to learn more about \"Chest Pain: Care Instructions. \" Current as of: May 27, 2016 Content Version: 11.1 © 1154-2912 [employer]. Care instructions adapted under license by [employer] (which disclaims liability or warranty for this information). If you have questions about a medical condition or this instruction, always ask your healthcare professional. [address] any warranty or liability for your use of this information. adapted under license by [employer] (which disclaims liability or warranty for this information). If you have questions about a medical condition or this instruction, always ask your healthcare professional. [address] any warranty or liability for your use of this information. Discharge Orders None General Information Please provide this summary of care documentation to your next provider. Introducing [facility]! Isabel Tsai introduces Handup patient portal. Now you can access parts of your medical record, email your doctor's office, and request medication refills online. 1. In your internet browser, go to [email or web address] 2. Click on the First Time User? Click Here link in the Sign In box. You will see the New Member Sign Up page. 3. Enter your Handup Access Code exactly as it appears below. You will not need to use this code after youve completed the sign-up process. If you do not sign up before the expiration date, you must request a new code. · Handup Access Code: [email or web address] Expires: 4/8/2017  6:41 PM 
 
4. Enter the last four digits of your Social Security Number (xxxx) and Date of Birth (mm/dd/yyyy) as indicated and click Submit. You will be taken to the next sign-up page. 5. Create a Handup ID. This will be your Handup login ID and cannot be changed, so think of one that is secure and easy to remember. 6. Create a Handup password. You can change your password at any time. 7. Enter your Password Reset Question and Answer. This can be used at a later time if you forget your password. 8. Enter your e-mail address. You will receive e-mail notification when new information is available in [address] 9. Click Sign Up. You can now view and download portions of your medical record. 10. Click the Download Summary menu link to download a portable copy of your medical information. If you have questions, please visit the Frequently Asked Questions section of the Handup website. Remember, Handup is NOT to be used for urgent needs. For medical emergencies, dial 911. Now available from your iPhone and Android! Patient Signature:  ____________________________________________________________ Date:  ____________________________________________________________  
  
Elicia  Provider Signature:  ____________________________________________________________ Date:  ____________________________________________________________

## 2017-01-08 NOTE — IP AVS SNAPSHOT
Summary of Care Report The Summary of Care report has been created to help improve care coordination. Users with access to [employer] or [address] (Web-based application) may access additional patient information including the Discharge Summary. If you are not currently a [address] user and need more information, please call the number listed below in the [place] [address] section and ask to be connected with Medical Records. Facility Information Name Address Phone [address] [address] [phone] Patient Information Patient Name Sex  Fiorella Lopez ([id number]) Female 1980 Discharge Information Admitting Provider Service Area Unit Corry Tadeo MD / [facility] General Surgery / [phone] Discharge Provider Discharge Date/Time Discharge Disposition Destination (none) 2017 (Pending) AHR (none) Patient Language Language ENGLISH [13] Problem List as of 2017  Never Reviewed Codes Priority Class Noted - Resolved Abdominal pain ICD-10-CM: R10.9 ICD-9-CM: 789.00   2017 - Present You are allergic to the following Allergen Reactions Codeine Hives Penicillins Hives Current Discharge Medication List  
  
START taking these medications Dose & Instructions Dispensing Information Comments  
 ciprofloxacin HCl 250 mg tablet Commonly known as:  CIPRO Dose:  250 mg Take 1 Tab by mouth every twelve (12) hours for 1 day. Quantity:  2 Tab Refills:  0 LORazepam 0.5 mg tablet Commonly known as:  ATIVAN Dose:  1 mg Take 2 Tabs by mouth every eight (8) hours. Max Daily Amount: 3 mg. Quantity:  15 Tab Refills:  0 Omeprazole delayed release 20 mg tablet Commonly known as:  PRILOSEC D/R  
 Dose:  20 mg Take 1 Tab by mouth daily for 30 days. Quantity:  30 Tab Refills:  0  
   
 oxyCODONE-acetaminophen 5-325 mg per tablet Commonly known as:  PERCOCET Dose:  1 Tab Take 1 Tab by mouth every four (4) hours as needed for Pain. Max Daily Amount: 6 Tabs. Quantity:  10 Tab Refills:  0  
   
 promethazine 50 mg tablet Commonly known as:  PHENERGAN Dose:  50 mg Take 1 Tab by mouth every six (6) hours as needed for Nausea. Quantity:  12 Tab Refills:  0 CONTINUE these medications which have NOT CHANGED Dose & Instructions Dispensing Information Comments  
 multivitamin tablet Commonly known as:  ONE A DAY Dose:  1 Tab Take 1 Tab by mouth daily. Refills:  0 STOP taking these medications Comments  
 magnesium 250 mg Tab  
   
   
 ondansetron 4 mg disintegrating tablet Commonly known as:  ZOFRAN ODT Follow-up Information Follow up With Details Comments Contact Info \[facility] [place] [facility]\"" EMERGENCY DEPT  If symptoms worsen, As needed [address] [place] [address] [address] [facility] 
[phone] [facility] Department Of Gastroenterology   [address] 
[address]oor 4 [place] [id number] [phone] None   None (395) Patient stated that they have no PCP Discharge Instructions Abdominal Pain: Care Instructions Your Care Instructions Abdominal pain has many possible causes. Some aren't serious and get better on their own in a few days. Others need more testing and treatment. If your pain continues or gets worse, you need to be rechecked and may need more tests to find out what is wrong. You may need surgery to correct the problem. Don't ignore new symptoms, such as fever, nausea and vomiting, urination problems, pain that gets worse, and dizziness. These may be signs of a more serious problem.  
Your doctor may have recommended a follow-up visit in the next 8 to 12 hours. If you are not getting better, you may need more tests or treatment. The doctor has checked you carefully, but problems can develop later. If you notice any problems or new symptoms, get medical treatment right away. Follow-up care is a key part of your treatment and safety. Be sure to make and go to all appointments, and call your doctor if you are having problems. It's also a good idea to know your test results and keep a list of the medicines you take. How can you care for yourself at home? · Rest until you feel better. · To prevent dehydration, drink plenty of fluids, enough so that your urine is light yellow or clear like water. Choose water and other caffeine-free clear liquids until you feel better. If you have kidney, heart, or liver disease and have to limit fluids, talk with your doctor before you increase the amount of fluids you drink. · If your stomach is upset, eat mild foods, such as rice, dry toast or crackers, bananas, and applesauce. Try eating several small meals instead of two or three large ones. · Wait until 48 hours after all symptoms have gone away before you have spicy foods, alcohol, and drinks that contain caffeine. · Do not eat foods that are high in fat. · Avoid anti-inflammatory medicines such as aspirin, ibuprofen (Advil, Motrin), and naproxen (Aleve). These can cause stomach upset. Talk to your doctor if you take daily aspirin for another health problem. When should you call for help? Call 911 anytime you think you may need emergency care. For example, call if: 
· You passed out (lost consciousness). · You pass maroon or very bloody stools. · You vomit blood or what looks like coffee grounds. · You have new, severe belly pain. Call your doctor now or seek immediate medical care if: 
· Your pain gets worse, especially if it becomes focused in one area of your belly. · You have a new or higher fever. · Your stools are black and look like tar, or they have streaks of blood. · You have unexpected vaginal bleeding. · You have symptoms of a urinary tract infection. These may include: 
¨ Pain when you urinate. ¨ Urinating more often than usual. 
¨ Blood in your urine. · You are dizzy or lightheaded, or you feel like you may faint. Watch closely for changes in your health, and be sure to contact your doctor if: 
· You are not getting better after 1 day (24 hours). Where can you learn more? Go to http://willy-yenny.info/. Enter T649 in the search box to learn more about \"Abdominal Pain: Care Instructions. \" Current as of: May 27, 2016 Content Version: 11.1 © 0792-2181 [employer]. Care instructions adapted under license by [employer] (which disclaims liability or warranty for this information). If you have questions about a medical condition or this instruction, always ask your healthcare professional. [place] [address] any warranty or liability for your use of this information. Nausea and Vomiting: Care Instructions Your Care Instructions When you are nauseated, you may feel weak and sweaty and notice a lot of saliva in your mouth. Nausea often leads to vomiting. Most of the time you do not need to worry about nausea and vomiting, but they can be signs of other illnesses. Two common causes of nausea and vomiting are stomach flu and food poisoning. Nausea and vomiting from viral stomach flu will usually start to improve within 24 hours. Nausea and vomiting from food poisoning may last from 12 to 48 hours. The doctor has checked you carefully, but problems can develop later. If you notice any problems or new symptoms, get medical treatment right away. Follow-up care is a key part of your treatment and safety.  Be sure to make and go to all appointments, and call your doctor if you are having problems. It's also a good idea to know your test results and keep a list of the medicines you take. How can you care for yourself at home? · To prevent dehydration, drink plenty of fluids, enough so that your urine is light yellow or clear like water. Choose water and other caffeine-free clear liquids until you feel better. If you have kidney, heart, or liver disease and have to limit fluids, talk with your doctor before you increase the amount of fluids you drink. · Rest in bed until you feel better. · When you are able to eat, try clear soups, mild foods, and liquids until all symptoms are gone for 12 to 48 hours. Other good choices include dry toast, crackers, cooked cereal, and gelatin dessert, such as Jell-O. When should you call for help? Call 911 anytime you think you may need emergency care. For example, call if: 
· You passed out (lost consciousness). Call your doctor now or seek immediate medical care if: 
· You have symptoms of dehydration, such as: ¨ Dry eyes and a dry mouth. ¨ Passing only a little dark urine. ¨ Feeling thirstier than usual. 
· You have new or worsening belly pain. · You have a new or higher fever. · You vomit blood or what looks like coffee grounds. Watch closely for changes in your health, and be sure to contact your doctor if: 
· You have ongoing nausea and vomiting. · Your vomiting is getting worse. · Your vomiting lasts longer than 2 days. · You are not getting better as expected. Where can you learn more? Go to http://willy-yenny.info/. Enter 25 [id number] in the search box to learn more about \"Nausea and Vomiting: Care Instructions. \" Current as of: May 27, 2016 Content Version: 11.1 © 7172-5362 [employer], [facility]. Care instructions Chest Pain: Care Instructions Your Care Instructions There are many things that can cause chest pain. Some are not serious and will get better on their own in a few days.  But some kinds of chest pain need more testing and treatment. Your doctor may have recommended a follow-up visit in the next 8 to 12 hours. If you are not getting better, you may need more tests or treatment. Even though your doctor has released you, you still need to watch for any problems. The doctor carefully checked you, but sometimes problems can develop later. If you have new symptoms or if your symptoms do not get better, get medical care right away. If you have worse or different chest pain or pressure that lasts more than 5 minutes or you passed out (lost consciousness), call 911 or seek other emergency help right away. A medical visit is only one step in your treatment. Even if you feel better, you still need to do what your doctor recommends, such as going to all suggested follow-up appointments and taking medicines exactly as directed. This will help you recover and help prevent future problems. How can you care for yourself at home? · Rest until you feel better. · Take your medicine exactly as prescribed. Call your doctor if you think you are having a problem with your medicine. · Do not drive after taking a prescription pain medicine. When should you call for help? Call 911 if: 
· You passed out (lost consciousness). · You have severe difficulty breathing. · You have symptoms of a heart attack. These may include: ¨ Chest pain or pressure, or a strange feeling in your chest. 
¨ Sweating. ¨ Shortness of breath. ¨ Nausea or vomiting. ¨ Pain, pressure, or a strange feeling in your back, neck, jaw, or upper belly or in one or both shoulders or arms. ¨ Lightheadedness or sudden weakness. ¨ A fast or irregular heartbeat. After you call 911, the  may tell you to chew 1 adult-strength or 2 to 4 low-dose aspirin. Wait for an ambulance. Do not try to drive yourself. Call your doctor today if: 
· You have any trouble breathing. · Your chest pain gets worse. · You are dizzy or lightheaded, or you feel like you may faint. · You are not getting better as expected. · You are having new or different chest pain. Where can you learn more? Go to http://willy-yenny.info/. Enter A120 in the search box to learn more about \"Chest Pain: Care Instructions. \" Current as of: May 27, 2016 Content Version: 11.1 © 3306-1846 [employer]. Care instructions adapted under license by [employer] (which disclaims liability or warranty for this information). If you have questions about a medical condition or this instruction, always ask your healthcare professional. [address] any warranty or liability for your use of this information. adapted under license by [employer] (which disclaims liability or warranty for this information). If you have questions about a medical condition or this instruction, always ask your healthcare professional. [address] any warranty or liability for your use of this information. Chart Review Routing History No Routing History on File

## 2017-01-09 ENCOUNTER — APPOINTMENT (OUTPATIENT)
Dept: CT IMAGING | Age: 37
DRG: 392 | End: 2017-01-09
Attending: FAMILY MEDICINE
Payer: SELF-PAY

## 2017-01-09 PROBLEM — R10.9 ABDOMINAL PAIN: Status: ACTIVE | Noted: 2017-01-09

## 2017-01-09 LAB
AMPHET UR QL SCN: NEGATIVE
ATRIAL RATE: 72 BPM
BARBITURATES UR QL SCN: NEGATIVE
BENZODIAZ UR QL: POSITIVE
CALCULATED P AXIS, ECG09: 51 DEGREES
CALCULATED R AXIS, ECG10: 45 DEGREES
CALCULATED T AXIS, ECG11: 7 DEGREES
CANNABINOIDS UR QL SCN: NEGATIVE
COCAINE UR QL SCN: NEGATIVE
DIAGNOSIS, 93000: NORMAL
DRUG SCRN COMMENT,DRGCM: ABNORMAL
METHADONE UR QL: NEGATIVE
OPIATES UR QL: NEGATIVE
P-R INTERVAL, ECG05: 184 MS
PCP UR QL: NEGATIVE
Q-T INTERVAL, ECG07: 420 MS
QRS DURATION, ECG06: 82 MS
QTC CALCULATION (BEZET), ECG08: 459 MS
VENTRICULAR RATE, ECG03: 72 BPM

## 2017-01-09 PROCEDURE — 74176 CT ABD & PELVIS W/O CONTRAST: CPT

## 2017-01-09 PROCEDURE — 74011250637 HC RX REV CODE- 250/637: Performed by: HOSPITALIST

## 2017-01-09 PROCEDURE — C9113 INJ PANTOPRAZOLE SODIUM, VIA: HCPCS | Performed by: HOSPITALIST

## 2017-01-09 PROCEDURE — 74011250637 HC RX REV CODE- 250/637: Performed by: FAMILY MEDICINE

## 2017-01-09 PROCEDURE — 65660000000 HC RM CCU STEPDOWN

## 2017-01-09 PROCEDURE — 74011250636 HC RX REV CODE- 250/636: Performed by: EMERGENCY MEDICINE

## 2017-01-09 PROCEDURE — 74011250636 HC RX REV CODE- 250/636: Performed by: FAMILY MEDICINE

## 2017-01-09 PROCEDURE — 74011000250 HC RX REV CODE- 250: Performed by: HOSPITALIST

## 2017-01-09 PROCEDURE — 74011250636 HC RX REV CODE- 250/636: Performed by: HOSPITALIST

## 2017-01-09 RX ORDER — FOLIC ACID 1 MG/1
1 TABLET ORAL DAILY
Status: DISCONTINUED | OUTPATIENT
Start: 2017-01-09 | End: 2017-01-09

## 2017-01-09 RX ORDER — MAGNESIUM SULFATE 1 G/100ML
1 INJECTION INTRAVENOUS ONCE
Status: COMPLETED | OUTPATIENT
Start: 2017-01-09 | End: 2017-01-09

## 2017-01-09 RX ORDER — MORPHINE SULFATE 2 MG/ML
1 INJECTION, SOLUTION INTRAMUSCULAR; INTRAVENOUS
Status: DISCONTINUED | OUTPATIENT
Start: 2017-01-09 | End: 2017-01-10

## 2017-01-09 RX ORDER — LORAZEPAM 1 MG/1
1 TABLET ORAL
Status: DISCONTINUED | OUTPATIENT
Start: 2017-01-09 | End: 2017-01-11 | Stop reason: HOSPADM

## 2017-01-09 RX ORDER — PROCHLORPERAZINE EDISYLATE 5 MG/ML
2.5 INJECTION INTRAMUSCULAR; INTRAVENOUS
Status: DISCONTINUED | OUTPATIENT
Start: 2017-01-09 | End: 2017-01-11 | Stop reason: HOSPADM

## 2017-01-09 RX ORDER — ONDANSETRON 2 MG/ML
4 INJECTION INTRAMUSCULAR; INTRAVENOUS
Status: DISCONTINUED | OUTPATIENT
Start: 2017-01-09 | End: 2017-01-09

## 2017-01-09 RX ORDER — SODIUM CHLORIDE 0.9 % (FLUSH) 0.9 %
5-10 SYRINGE (ML) INJECTION AS NEEDED
Status: DISCONTINUED | OUTPATIENT
Start: 2017-01-09 | End: 2017-01-11 | Stop reason: HOSPADM

## 2017-01-09 RX ORDER — SODIUM CHLORIDE 9 MG/ML
75 INJECTION, SOLUTION INTRAVENOUS CONTINUOUS
Status: DISCONTINUED | OUTPATIENT
Start: 2017-01-09 | End: 2017-01-10

## 2017-01-09 RX ORDER — SODIUM CHLORIDE 0.9 % (FLUSH) 0.9 %
5-10 SYRINGE (ML) INJECTION EVERY 8 HOURS
Status: DISCONTINUED | OUTPATIENT
Start: 2017-01-09 | End: 2017-01-11 | Stop reason: HOSPADM

## 2017-01-09 RX ORDER — LORAZEPAM 2 MG/ML
2 INJECTION INTRAMUSCULAR
Status: COMPLETED | OUTPATIENT
Start: 2017-01-09 | End: 2017-01-09

## 2017-01-09 RX ORDER — LANOLIN ALCOHOL/MO/W.PET/CERES
100 CREAM (GRAM) TOPICAL DAILY
Status: DISCONTINUED | OUTPATIENT
Start: 2017-01-09 | End: 2017-01-09

## 2017-01-09 RX ORDER — CIPROFLOXACIN 500 MG/1
250 TABLET ORAL EVERY 12 HOURS
Status: DISCONTINUED | OUTPATIENT
Start: 2017-01-09 | End: 2017-01-11 | Stop reason: HOSPADM

## 2017-01-09 RX ADMIN — Medication 10 ML: at 13:00

## 2017-01-09 RX ADMIN — LORAZEPAM 1 MG: 1 TABLET ORAL at 23:48

## 2017-01-09 RX ADMIN — Medication 10 ML: at 04:50

## 2017-01-09 RX ADMIN — Medication 10 ML: at 20:55

## 2017-01-09 RX ADMIN — SODIUM CHLORIDE 40 MG: 9 INJECTION INTRAMUSCULAR; INTRAVENOUS; SUBCUTANEOUS at 10:52

## 2017-01-09 RX ADMIN — PROCHLORPERAZINE EDISYLATE 2.5 MG: 5 INJECTION INTRAMUSCULAR; INTRAVENOUS at 20:53

## 2017-01-09 RX ADMIN — LORAZEPAM 1 MG: 1 TABLET ORAL at 10:52

## 2017-01-09 RX ADMIN — SODIUM CHLORIDE 75 ML/HR: 900 INJECTION, SOLUTION INTRAVENOUS at 20:56

## 2017-01-09 RX ADMIN — PROCHLORPERAZINE EDISYLATE 2.5 MG: 5 INJECTION INTRAMUSCULAR; INTRAVENOUS at 15:05

## 2017-01-09 RX ADMIN — Medication 10 ML: at 23:49

## 2017-01-09 RX ADMIN — Medication 100 MG: at 09:17

## 2017-01-09 RX ADMIN — MAGNESIUM SULFATE HEPTAHYDRATE 1 G: 1 INJECTION, SOLUTION INTRAVENOUS at 04:49

## 2017-01-09 RX ADMIN — Medication 1 MG: at 15:06

## 2017-01-09 RX ADMIN — FOLIC ACID 1 MG: 1 TABLET ORAL at 09:17

## 2017-01-09 RX ADMIN — CIPROFLOXACIN HYDROCHLORIDE 250 MG: 500 TABLET, FILM COATED ORAL at 13:30

## 2017-01-09 RX ADMIN — LORAZEPAM 2 MG: 2 INJECTION INTRAMUSCULAR; INTRAVENOUS at 00:49

## 2017-01-09 RX ADMIN — SODIUM CHLORIDE 75 ML/HR: 900 INJECTION, SOLUTION INTRAVENOUS at 04:49

## 2017-01-09 RX ADMIN — Medication 1 MG: at 09:23

## 2017-01-09 RX ADMIN — CIPROFLOXACIN HYDROCHLORIDE 250 MG: 500 TABLET, FILM COATED ORAL at 20:56

## 2017-01-09 RX ADMIN — Medication 1 MG: at 20:51

## 2017-01-09 RX ADMIN — ONDANSETRON 4 MG: 2 INJECTION INTRAMUSCULAR; INTRAVENOUS at 10:52

## 2017-01-09 RX ADMIN — Medication 1 MG: at 04:49

## 2017-01-09 NOTE — ED NOTES
ED RN attempted to give pt report. Receiving RN did not answer. General Surgery Charge RN called again and asked for them to call us in 10 mins.

## 2017-01-09 NOTE — ED NOTES
Patient presents to ED with C/O N/V, abd pain, chest pain, SOB and dizziness that started last week. The pt stated she was admitted at at [facility] for 3 days last week for pancreatitis. The pt stated the symptoms have not gone away. The pt stated the chest pain and SOB started today. The pt stated she fell 2x due to dizziness that started 2 weeks ago, but denies LOC and hitting head head. The pt denies fever, chills, and urinary symptoms. Patient is A&Ox3, side rails up, call bell w/in reach, and aware of plan of care. The patient is in NAD.

## 2017-01-09 NOTE — ED NOTES
Pt report given to Jona Walker RN (oncoming) from Derenda Otis, [place] (off going). All questions answered. Pt chart reviewed including results, notes, inpatient orders and MAR. Pt updated to current status.

## 2017-01-09 NOTE — ED NOTES
Bedside shift change report given to [facility] NATALIE (oncoming nurse) by Bro Galaviz RN (offgoing nurse). Report included the following information ED Summary and MAR.

## 2017-01-09 NOTE — ED NOTES
Patient resting comfortably, side rails up, call bell w/in reach, no further needs expressed at this time, aware of POC.

## 2017-01-09 NOTE — PROGRESS NOTES
Problem: Pain  Goal: *Control of Pain  Outcome: Progressing Towards Goal  Patient using pharmaceutical methods of pain control.

## 2017-01-09 NOTE — ED NOTES
Bedside and Verbal shift change report given to Gwendolyn Treadwell (oncoming nurse) by Federico Ayoub RN (offgoing nurse). Report included the following information SBAR, Kardex, ED Summary and MAR.

## 2017-01-09 NOTE — DISCHARGE INSTRUCTIONS
Abdominal Pain: Care Instructions  Your Care Instructions    Abdominal pain has many possible causes. Some aren't serious and get better on their own in a few days. Others need more testing and treatment. If your pain continues or gets worse, you need to be rechecked and may need more tests to find out what is wrong. You may need surgery to correct the problem. Don't ignore new symptoms, such as fever, nausea and vomiting, urination problems, pain that gets worse, and dizziness. These may be signs of a more serious problem. Your doctor may have recommended a follow-up visit in the next 8 to 12 hours. If you are not getting better, you may need more tests or treatment. The doctor has checked you carefully, but problems can develop later. If you notice any problems or new symptoms, get medical treatment right away. Follow-up care is a key part of your treatment and safety. Be sure to make and go to all appointments, and call your doctor if you are having problems. It's also a good idea to know your test results and keep a list of the medicines you take. How can you care for yourself at home? · Rest until you feel better. · To prevent dehydration, drink plenty of fluids, enough so that your urine is light yellow or clear like water. Choose water and other caffeine-free clear liquids until you feel better. If you have kidney, heart, or liver disease and have to limit fluids, talk with your doctor before you increase the amount of fluids you drink. · If your stomach is upset, eat mild foods, such as rice, dry toast or crackers, bananas, and applesauce. Try eating several small meals instead of two or three large ones. · Wait until 48 hours after all symptoms have gone away before you have spicy foods, alcohol, and drinks that contain caffeine. · Do not eat foods that are high in fat. · Avoid anti-inflammatory medicines such as aspirin, ibuprofen (Advil, Motrin), and naproxen (Aleve).  These can cause stomach upset. Talk to your doctor if you take daily aspirin for another health problem. When should you call for help? Call 911 anytime you think you may need emergency care. For example, call if:  · You passed out (lost consciousness). · You pass maroon or very bloody stools. · You vomit blood or what looks like coffee grounds. · You have new, severe belly pain. Call your doctor now or seek immediate medical care if:  · Your pain gets worse, especially if it becomes focused in one area of your belly. · You have a new or higher fever. · Your stools are black and look like tar, or they have streaks of blood. · You have unexpected vaginal bleeding. · You have symptoms of a urinary tract infection. These may include:  ¨ Pain when you urinate. ¨ Urinating more often than usual.  ¨ Blood in your urine. · You are dizzy or lightheaded, or you feel like you may faint. Watch closely for changes in your health, and be sure to contact your doctor if:  · You are not getting better after 1 day (24 hours). Where can you learn more? Go to http://willyHartman Wrightyenny.info/. Enter F358 in the search box to learn more about \"Abdominal Pain: Care Instructions. \"  Current as of: May 27, 2016  Content Version: 11.1  © 3209-4983 [employer]. Care instructions adapted under license by "[employer] (which disclaims liability or warranty for this information). If you have questions about a medical condition or this instruction, always ask your healthcare professional. [place] [address] any warranty or liability for your use of this information. Nausea and Vomiting: Care Instructions  Your Care Instructions    When you are nauseated, you may feel weak and sweaty and notice a lot of saliva in your mouth. Nausea often leads to vomiting. Most of the time you do not need to worry about nausea and vomiting, but they can be signs of other illnesses.   Two common causes of nausea and vomiting are stomach flu and food poisoning. Nausea and vomiting from viral stomach flu will usually start to improve within 24 hours. Nausea and vomiting from food poisoning may last from 12 to 48 hours. The doctor has checked you carefully, but problems can develop later. If you notice any problems or new symptoms, get medical treatment right away. Follow-up care is a key part of your treatment and safety. Be sure to make and go to all appointments, and call your doctor if you are having problems. It's also a good idea to know your test results and keep a list of the medicines you take. How can you care for yourself at home? · To prevent dehydration, drink plenty of fluids, enough so that your urine is light yellow or clear like water. Choose water and other caffeine-free clear liquids until you feel better. If you have kidney, heart, or liver disease and have to limit fluids, talk with your doctor before you increase the amount of fluids you drink. · Rest in bed until you feel better. · When you are able to eat, try clear soups, mild foods, and liquids until all symptoms are gone for 12 to 48 hours. Other good choices include dry toast, crackers, cooked cereal, and gelatin dessert, such as Jell-O. When should you call for help? Call 911 anytime you think you may need emergency care. For example, call if:  · You passed out (lost consciousness). Call your doctor now or seek immediate medical care if:  · You have symptoms of dehydration, such as:  ¨ Dry eyes and a dry mouth. ¨ Passing only a little dark urine. ¨ Feeling thirstier than usual.  · You have new or worsening belly pain. · You have a new or higher fever. · You vomit blood or what looks like coffee grounds. Watch closely for changes in your health, and be sure to contact your doctor if:  · You have ongoing nausea and vomiting. · Your vomiting is getting worse. · Your vomiting lasts longer than 2 days.   · You are not getting better as expected. Where can you learn more? Go to http://willy-yenny.info/. Enter 25 [id number] in the search box to learn more about \"Nausea and Vomiting: Care Instructions. \"  Current as of: May 27, 2016  Content Version: 11.1  © 3657-5922 [employer]. Care instructions        Chest Pain: Care Instructions  Your Care Instructions  There are many things that can cause chest pain. Some are not serious and will get better on their own in a few days. But some kinds of chest pain need more testing and treatment. Your doctor may have recommended a follow-up visit in the next 8 to 12 hours. If you are not getting better, you may need more tests or treatment. Even though your doctor has released you, you still need to watch for any problems. The doctor carefully checked you, but sometimes problems can develop later. If you have new symptoms or if your symptoms do not get better, get medical care right away. If you have worse or different chest pain or pressure that lasts more than 5 minutes or you passed out (lost consciousness), call 911 or seek other emergency help right away. A medical visit is only one step in your treatment. Even if you feel better, you still need to do what your doctor recommends, such as going to all suggested follow-up appointments and taking medicines exactly as directed. This will help you recover and help prevent future problems. How can you care for yourself at home? · Rest until you feel better. · Take your medicine exactly as prescribed. Call your doctor if you think you are having a problem with your medicine. · Do not drive after taking a prescription pain medicine. When should you call for help? Call 911 if:  · You passed out (lost consciousness). · You have severe difficulty breathing. · You have symptoms of a heart attack. These may include:  ¨ Chest pain or pressure, or a strange feeling in your chest.  ¨ Sweating.   ¨ Shortness of breath. ¨ Nausea or vomiting. ¨ Pain, pressure, or a strange feeling in your back, neck, jaw, or upper belly or in one or both shoulders or arms. ¨ Lightheadedness or sudden weakness. ¨ A fast or irregular heartbeat. After you call 911, the  may tell you to chew 1 adult-strength or 2 to 4 low-dose aspirin. Wait for an ambulance. Do not try to drive yourself. Call your doctor today if:  · You have any trouble breathing. · Your chest pain gets worse. · You are dizzy or lightheaded, or you feel like you may faint. · You are not getting better as expected. · You are having new or different chest pain. Where can you learn more? Go to http://willy-yenny.info/. Enter A120 in the search box to learn more about \"Chest Pain: Care Instructions. \"  Current as of: May 27, 2016  Content Version: 11.1  © 5302-2543 [employer]. Care instructions adapted under license by [facility] (which disclaims liability or warranty for this information). If you have questions about a medical condition or this instruction, always ask your healthcare professional. [address] any warranty or liability for your use of this information. adapted under license by [facility] (which disclaims liability or warranty for this information). If you have questions about a medical condition or this instruction, always ask your healthcare professional. [address] any warranty or liability for your use of this information.

## 2017-01-09 NOTE — H&P
Hospitalist Admission Note    NAME: Bry Sidhu   :  1980   MRN:  [id number]     Date/Time:  2017 2:51 AM    Patient PCP: None  ______________________________________________________________________        My assessment of this patient's clinical condition and my plan of care is as follows. Given the patient's current clinical presentation, I have a high level of concern for decompensation if discharged from the ED. Complex decision making was performed which includes reviewing the patient's available past medical records, laboratory results, and Xray films. I have also directly communicated my plan and discussed this case with the involved ED physician.      Assessment / Plan:  1. Worsening abdominal pain(POA): with recent acute pancreatitis. Possibly du eto her alcohol ingestion and advancement in diet. Lipase 275. Will keep NPO> Add IVF. chk Ct abdomen. Follow clinical progress. 2. Alcohol abuse: possible withdrawal. Drinks about a liter of alcohol, last drink yday morning. CIWA protocol. Monitor for seizures. Add thiamine and folate  3. Hypomagnesemia: replete  4. Lactic acidosis: afebrile, UA with 3+ bacteria but neg leuc esterase, send ofr ucx, follow lactate levels after hydration        Code Status: full  Surrogate Decision Maker:her mom    DVT Prophylaxis: SCDS  GI Prophylaxis: not indicated    Baseline: ambulatory        Subjective:   CHIEF COMPLAINT: abdominal pain    HISTORY OF PRESENT ILLNESS:     Rosi Nagy is a 43 y.o.  female who presents with   abdominal pain. Pt with h/o pancreatitis, alcohol abuse, withdrawal ,seizures due to withdrawal comes with above. Per pt [facility] and was discharged yday. She went home and had tried to eat something but had nausea. She drank a shot of alcohol and the pain worsened and she had to come here.   In ED, lactate 2.2, UA with 3+ bacteria, no leuc esterase, lipase 275, mag 1.5, /110    We were asked to admit for work up and evaluation of the above problems. Past Medical History   Diagnosis Date    Endometriosis     Ill-defined condition      meningitis    Liver mass 2016     small liver mass found per pt during last hospital stay    Pancreatitis     Seizures ([address]      due to alcohol withdrawal        Past Surgical History   Procedure Laterality Date    Pr liver surgery procedure unlisted  2004     \"Half of liver removed\"     Colposcopy      Hx cholecystectomy      Hx leep procedure         Social History   Substance Use Topics    Smoking status: Current Every Day Smoker     Packs/day: 0.50    Smokeless tobacco: Not on file    Alcohol use 3.5 oz/week     7 Cans of beer per week      Comment: socially; wine daily since last week, drinks around 1L vodka a day, last drink 5/11/16        History reviewed. No pertinent family history. No FH of CAD  Allergies   Allergen Reactions    Codeine Hives    Penicillins Hives        Prior to Admission medications    Medication Sig Start Date End Date Taking? Authorizing Provider   LORazepam (ATIVAN) 0.5 mg tablet Take 2 Tabs by mouth every eight (8) hours. Max Daily Amount: 3 mg. 1/8/17  Yes Erika Alcocer MD   promethazine (PHENERGAN) 50 mg tablet Take 1 Tab by mouth every six (6) hours as needed for Nausea. 1/8/17  Yes Erika Alcocer MD   ondansetron (ZOFRAN ODT) 4 mg disintegrating tablet Take 1 Tab by mouth every eight (8) hours as needed for Nausea. 12/16/16  Yes Connie Guallpa PA-C   magnesium 250 mg tab Take 1 Tab by mouth daily. 7/4/16  Yes Earlene Ovalles MD   multivitamin (ONE A DAY) tablet Take 1 Tab by mouth daily. Yes Rosa Isela Romero MD       REVIEW OF SYSTEMS:     I am not able to complete the review of systems because:    The patient is intubated and sedated    The patient has altered mental status due to his acute medical problems    The patient has baseline aphasia from prior stroke(s)    The patient has baseline dementia and is not reliable historian    The patient is in acute medical distress and unable to provide information           Total of 12 systems reviewed as follows:       POSITIVE= underlined text  Negative = text not underlined  General:  fever, chills, sweats, generalized weakness, weight loss/gain,      loss of appetite   Eyes:    blurred vision, eye pain, loss of vision, double vision  ENT:    rhinorrhea, pharyngitis   Respiratory:   cough, sputum production, SOB, BRADSHAW, wheezing, pleuritic pain   Cardiology:   chest pain, palpitations, orthopnea, PND, edema, syncope   Gastrointestinal:  abdominal pain , N/V, diarrhea, dysphagia, constipation, bleeding   Genitourinary:  frequency, urgency, dysuria, hematuria, incontinence   Muskuloskeletal :  arthralgia, myalgia, back pain  Hematology:  easy bruising, nose or gum bleeding, lymphadenopathy   Dermatological: rash, ulceration, pruritis, color change / jaundice  Endocrine:   hot flashes or polydipsia   Neurological:  headache, dizziness, confusion, focal weakness, paresthesia,     Speech difficulties, memory loss, gait difficulty  Psychological: Feelings of anxiety, depression, agitation    Objective:   VITALS:    Visit Vitals    /61    Pulse 79    Temp 98.3 °F (36.8 °C)    Resp 27    Ht 5' 5\" (1.651 m)    Wt 76.9 kg (169 lb 8.5 oz)    SpO2 94%    BMI 28.21 kg/m2       PHYSICAL EXAM:    General:    Alert, cooperative, no distress, appears stated age. HEENT: Atraumatic, anicteric sclerae, pink conjunctivae     No oral ulcers, mucosa moist, throat clear, dentition fair  Neck:  Supple, symmetrical,  thyroid: non tender  Lungs:   Clear to auscultation bilaterally. No Wheezing or Rhonchi. No rales. Chest wall:  No tenderness  No Accessory muscle use. Heart:   Regular  rhythm,  No  murmur   No edema  Abdomen:   Soft,  Diffuse tenderness Not distended.   Bowel sounds normal  Extremities: No cyanosis. No clubbing  Skin:     Not pale. Not Jaundiced  No rashes   Psych:   Not depressed. Not anxious or agitated. Neurologic: EOMs intact. No facial asymmetry. No aphasia or slurred speech. Symmetrical strength, Sensation grossly intact. Alert and oriented X 4.     _______________________________________________________________________  Care Plan discussed with:    Comments   Patient x    Family      RN x    Care Manager                    Consultant:      _______________________________________________________________________  Recommended Disposition:   Home with Family x   HH/PT/OT/RN    SNF/LTC    SHAUNA    ________________________________________________________________________  TOTAL TIME:  72 Minutes    Critical Care Provided     Minutes non procedure based      Comments   >50% of visit spent in counseling and coordination of care  Chart review  Discussion with patient and/or family and questions answered     ________________________________________________________________________  Joe Cabrales MD    Procedures: see electronic medical records for all procedures/Xrays and details which were not copied into this note but were reviewed prior to creation of Plan.     LAB DATA REVIEWED:    Recent Results (from the past 24 hour(s))   EKG, 12 LEAD, INITIAL    Collection Time: 01/08/17  5:10 PM   Result Value Ref Range    Ventricular Rate 72 BPM    Atrial Rate 72 BPM    P-R Interval 184 ms    QRS Duration 82 ms    Q-T Interval 420 ms    QTC Calculation (Bezet) 459 ms    Calculated P Axis 51 degrees    Calculated R Axis 45 degrees    Calculated T Axis 7 degrees    Diagnosis       Normal sinus rhythm  Possible Left atrial enlargement  Cannot rule out Anterior infarct , age undetermined  When compared with ECG of 04-JUL-2016 19:57,  Inverted T waves have replaced nonspecific T wave abnormality in Anterior   leads     CBC WITH AUTOMATED DIFF    Collection Time: 01/08/17  6:24 PM   Result Value Ref Range    WBC 6.0 3.6 - 11.0 K/uL    RBC 3.99 3.80 - 5.20 M/uL    HGB 13.3 11.5 - 16.0 g/dL    HCT 38.1 35.0 - 47.0 %    MCV 95.5 80.0 - 99.0 FL    MCH 33.3 26.0 - 34.0 PG    MCHC 34.9 30.0 - 36.5 g/dL    RDW 14.2 11.5 - 14.5 %    PLATELET 099 529 - 332 K/uL    NEUTROPHILS 74 32 - 75 %    LYMPHOCYTES 20 12 - 49 %    MONOCYTES 5 5 - 13 %    EOSINOPHILS 1 0 - 7 %    BASOPHILS 0 0 - 1 %    ABS. NEUTROPHILS 4.4 1.8 - 8.0 K/UL    ABS. LYMPHOCYTES 1.2 0.8 - 3.5 K/UL    ABS. MONOCYTES 0.3 0.0 - 1.0 K/UL    ABS. EOSINOPHILS 0.1 0.0 - 0.4 K/UL    ABS. BASOPHILS 0.0 0.0 - 0.1 K/UL   METABOLIC PANEL, COMPREHENSIVE    Collection Time: 01/08/17  6:24 PM   Result Value Ref Range    Sodium 140 136 - 145 mmol/L    Potassium 3.6 3.5 - 5.1 mmol/L    Chloride 100 97 - 108 mmol/L    CO2 22 21 - 32 mmol/L    Anion gap 18 (H) 5 - 15 mmol/L    Glucose 52 (L) 65 - 100 mg/dL    BUN 8 6 - 20 MG/DL    Creatinine 0.62 0.55 - 1.02 MG/DL    BUN/Creatinine ratio 13 12 - 20      GFR est AA >60 >60 ml/min/1.73m2    GFR est non-AA >60 >60 ml/min/1.73m2    Calcium 9.1 8.5 - 10.1 MG/DL    Bilirubin, total 0.5 0.2 - 1.0 MG/DL    ALT 43 12 - 78 U/L    AST 73 (H) 15 - 37 U/L    Alk.  phosphatase 128 (H) 45 - 117 U/L    Protein, total 8.8 (H) 6.4 - 8.2 g/dL    Albumin 4.0 3.5 - 5.0 g/dL    Globulin 4.8 (H) 2.0 - 4.0 g/dL    A-G Ratio 0.8 (L) 1.1 - 2.2     TROPONIN I    Collection Time: 01/08/17  6:24 PM   Result Value Ref Range    Troponin-I, Qt. <0.04 <0.05 ng/mL   CK W/ REFLX CKMB    Collection Time: 01/08/17  6:24 PM   Result Value Ref Range     26 - 192 U/L   LIPASE    Collection Time: 01/08/17  6:24 PM   Result Value Ref Range    Lipase 275 73 - 393 U/L   MAGNESIUM    Collection Time: 01/08/17  6:24 PM   Result Value Ref Range    Magnesium 1.5 (L) 1.6 - 2.4 mg/dL   URINALYSIS W/ REFLEX CULTURE    Collection Time: 01/08/17  7:51 PM   Result Value Ref Range    Color YELLOW/STRAW      Appearance HAZY (A) CLEAR      Specific gravity 1.020 1.003 - 1.030      pH (UA) 6.0 5.0 - 8.0      Protein TRACE (A) NEG mg/dL    Glucose NEGATIVE  NEG mg/dL    Ketone 40 (A) NEG mg/dL    Blood MODERATE (A) NEG      Urobilinogen 1.0 0.2 - 1.0 EU/dL    Nitrites NEGATIVE  NEG      Leukocyte Esterase NEGATIVE  NEG      WBC 5-10 0 - 4 /hpf    RBC 0-5 0 - 5 /hpf    Epithelial cells MODERATE (A) FEW /lpf    Bacteria 4+ (A) NEG /hpf    UA:UC IF INDICATED URINE CULTURE ORDERED (A) CNI      Hyaline Cast 0-2 0 - 5 /lpf   HCG URINE, QL    Collection Time: 01/08/17  7:51 PM   Result Value Ref Range    HCG urine, Ql. NEGATIVE  NEG     BILIRUBIN, CONFIRM    Collection Time: 01/08/17  7:51 PM   Result Value Ref Range    Bilirubin UA, confirm NEGATIVE  NEG     LACTIC ACID, PLASMA    Collection Time: 01/08/17  8:46 PM   Result Value Ref Range    Lactic acid 2.2 (HH) 0.4 - 2.0 MMOL/L

## 2017-01-09 NOTE — ED NOTES
Gave pt crackers and gingerale per the MD Eugene. Pt stated the gingerale made her stomach hurt and that she was able to only eat 1 cracker. Missy Perdue MD notified.

## 2017-01-09 NOTE — CDMP QUERY
Account Number: [de-identified]  MRN: [id number]  Patient: Elvia Cason: [id number]  Clinician Name: Delta Khan [facility] :  Please clarify the clinical significance of UA findings ? 100K colony count & GNR cx result. Please clarify if this patient is being treated/managed for:    => UTI POA  =>  Acutre Cystitis   =>Other Explanation of clinical findings  =>Unable to Determine (no explanation of clinical findings)    Risk factor: age,  ETOH abuse   Clinical indicators: abd pain, UA abnormal   Tx  Cipro   Please clarify and document your clinical opinion in the progress notes and discharge summary including the definitive and/or presumptive diagnosis, (suspected or probable), related to the above clinical findings. Please include clinical findings supporting your diagnosis.     Thank Katharine Schafer RN, CDMP

## 2017-01-09 NOTE — ED PROVIDER NOTES
HPI Comments: 43year old woman with hx of alcoholic pancreatitis and seizures secondary to withdra-wal, presenting to the ED with CC of abdominal pain, chest pain and withdrawal symptoms. She reports being discharged from [address] [place] [address] yesterday after a 3 day hospitalization for pancreatitis. She reports being NPO for that time and given IV pain meds and antiemetics. She also reports that her lipase normalized prior to being discharged. She reports continued severe abdominal pain that radiates to her back with nausea and vomiting. She reports not being able to tolerate any food or liquids today. She also reports starting to have central chest pain since two hours prior to arrival. Endorses \"withdrawal symptoms\" which include shaking and feeling anxious. She reports taking one shot of liquor today for her shakes with no improvement. She reports normally drinking 1 pints of liquor daily. She denies fever, chills, diarrhea, constipation, shortness of breath. Patient is a 43 y.o. female presenting with chest pain, abdominal pain, vomiting, and withdrawal. The history is provided by a parent. Chest Pain (Angina)    Associated symptoms include abdominal pain, back pain, nausea and vomiting. Pertinent negatives include no cough, no fever, no numbness, no shortness of breath and no weakness. Abdominal Pain    Associated symptoms include nausea, vomiting, chest pain and back pain. Pertinent negatives include no fever, no diarrhea, no constipation and no dysuria. Vomiting    Associated symptoms include abdominal pain. Pertinent negatives include no chills, no fever, no diarrhea and no cough. Withdrawal   There areno weakness present at this time. Associated symptoms include nausea and vomiting. Pertinent negatives include no fever.         Past Medical History:   Diagnosis Date    Endometriosis     Ill-defined condition      meningitis    Liver mass 2016     small liver mass found per pt during last hospital stay    Pancreatitis     Seizures ([facility] [address]      due to alcohol withdrawal       Past Surgical History:   Procedure Laterality Date    Pr liver surgery procedure unlisted  2004     \"Half of liver removed\"     Colposcopy      Hx cholecystectomy      Hx leep procedure           No family history on file. Social History     Social History    Marital status:      Spouse name: N/A    Number of children: N/A    Years of education: N/A     Occupational History    Not on file. Social History Main Topics    Smoking status: Current Every Day Smoker     Packs/day: 0.50    Smokeless tobacco: Not on file    Alcohol use 3.5 oz/week     7 Cans of beer per week      Comment: socially; wine daily since last week, drinks around 1L vodka a day, last drink 5/11/16    Drug use: No    Sexual activity: Yes     Partners: Male     Birth control/ protection: None     Other Topics Concern    Not on file     Social History Narrative         ALLERGIES: Codeine and Penicillins    Review of Systems   Constitutional: Negative for chills and fever. Respiratory: Negative for cough and shortness of breath. Cardiovascular: Positive for chest pain. Gastrointestinal: Positive for abdominal pain, nausea and vomiting. Negative for constipation and diarrhea. Genitourinary: Negative for dysuria. Musculoskeletal: Positive for back pain. Neurological: Positive for tremors. Negative for weakness and numbness. Psychiatric/Behavioral: The patient is nervous/anxious. All other systems reviewed and are negative. Vitals:    01/08/17 1705   BP: (!) 192/110   Pulse: 78   Resp: 14   Temp: 98.3 °F (36.8 °C)   SpO2: 100%   Weight: 76.9 kg (169 lb 8.5 oz)   Height: 5' 5\" (1.651 m)            Physical Exam   Constitutional: She is oriented to person, place, and time. She is cooperative. Non-toxic appearance. Appears mildly uncomfortable   HENT:   Head: Normocephalic and atraumatic.    Eyes: EOM are normal. Pupils are equal, round, and reactive to light. Right eye exhibits no discharge. Left eye exhibits no discharge. No scleral icterus. Neck: Normal range of motion. Neck supple. Cardiovascular: Normal rate and regular rhythm. Exam reveals no gallop and no friction rub. No murmur heard. Pulmonary/Chest: Effort normal and breath sounds normal. No respiratory distress. She has no wheezes. She has no rales. Abdominal: Soft. Bowel sounds are normal. She exhibits no distension. There is tenderness (Diffuse tenderness, worst in epigastric region). Guarding: Voluntary guarding. Musculoskeletal: Normal range of motion. She exhibits no edema. Neurological: She is alert and oriented to person, place, and time. She has normal strength. She displays tremor. GCS eye subscore is 4. GCS verbal subscore is 5. GCS motor subscore is 6. Skin: Skin is warm and dry. Nursing note and vitals reviewed. MDM  Number of Diagnoses or Management Options  Abdominal pain, epigastric:   Atypical chest pain:   Nausea and vomiting, intractability of vomiting not specified, unspecified vomiting type: Withdrawal symptoms, alcohol, uncomplicated [facility]):   Diagnosis management comments: 43year old woman with hx of alcoholic pancreatitis presenting to the ED one day after a 3 day hospital stay at [facility] for pancreatitis with CC of recurrent abdominal pain, nausea, vomiting and chest pain. She is hypertensive 192/110 initially. She is afebrile. Exam significant for diffuse abdominal tenderness (worse over epigastrium) and tremor. Ordered CBC, CMP, lipase, CXR, ECG, cardiac enzymes. Will treat pain and nausea with IV meds.      DDx:  Pancreatitis  Gastritis  Appendicitis  Alcohol withdrawal         Amount and/or Complexity of Data Reviewed  Clinical lab tests: ordered and reviewed  Tests in the radiology section of CPT®: ordered and reviewed  Tests in the medicine section of CPT®: ordered and reviewed  Obtain history from someone other than the patient: yes  Independent visualization of images, tracings, or specimens: yes    Risk of Complications, Morbidity, and/or Mortality  Presenting problems: moderate  Diagnostic procedures: moderate  Management options: moderate    Patient Progress  Patient progress: stable    ED Course     Progress Note    1000: Labs and imaging resulted and reviewed by me. No significant abnormalities in labs. ECG non-ischemic. CXR without evidence of acute process. Patient feeling slightly improved symptoms after 1 mg IV ativan, 4mg IV zofran, 1mg IV dilaudid. Additional medication given (Same doses)    1200: Symptoms continue and is still having slight tremor in her hands. Total of 2mg ativan has been given prior to this assessment. She is not able to tolerate PO. She reports slightly improved abdominal pain. Her vitals remain stable. Will admit patient for alcohol withdrawal and pancreatitis. Procedures     I personally saw and examined the patient. I found the following on physical exam:    H/O ETOH abuse recent pancreatitis. Continues to drink. No surgical signs here and no signs of DT's. Talked about alcohol cessation and will give short course benzo's    I have reviewed and agree with the Resident's findings, including all diagnostic interpretations, and plans as written. I was present during the key portions of separately billed procedures.    Didi Jean-Baptiste MD

## 2017-01-09 NOTE — PROGRESS NOTES
End of Shift Nursing Note    Bedside shift change report given to Alexandre Gunter RN (oncoming nurse) by Oly Chery RN (offgoing nurse). Report included the following information SBAR, Kardex, Intake/Output, MAR and Recent Results. Zone Phone:   [phone]    Significant changes during shift:       Non-emergent issues for physician to address:        Number times ambulated in hallway past shift: 0. Up in room. Number of times OOB to chair past shift: 0    POD #:      Vital Signs:    Temp: 98.1 °F (36.7 °C)     Pulse (Heart Rate): 62     BP: 132/79     Resp Rate: 16     O2 Sat (%): 100 %    Lines & Drains:     Urinary Catheter? NO   Placement Date:    Medical Necessity:   Central Line? NO   Placement Date:    Medical Necessity:   PICC Line? NO   Placement Date:    Medical Necessity:     NG tube [] in [] removed [] not applicable   Drains [] in [] removed [] not applicable     Skin Integrity:      Wounds: no   Dressings Present: no    Wound Concerns: no      GI:    Current diet:  DIET NPO With Ice Chips    Nausea: YES  Vomiting: YES  Bowel Sounds: YES  Flatus: YES  Last Bowel Movement: yesterday   Appearance:     Respiratory:  Supplemental O2: NO      Device:    via  Liters/min     Incentive Spirometer: NO  Volume:   Coughing and Deep Breathing: YES  Oral Care: YES  Understanding (patient/family education): YES   Getting out of bed: YES  Head of bed elevation: YES    Patient Safety:    Falls Score: 1  Mobility Score: 0  Bed Alarm On? NO  Sitter? NO      Opportunity for questions and clarification was given to oncoming nurse. Patient bed is in upright position, side rails are up x 2, door & observation blinds open as needed, call bell within reach and patient not in distress.     Evelio Gilbert

## 2017-01-09 NOTE — ED NOTES
TRANSFER - OUT REPORT:    Verbal report given to [address] [place] [address]  on [employer]  being transferred to [facility]) for routine progression of care       Report consisted of patients Situation, Background, Assessment and   Recommendations(SBAR). Information from the following report(s) ED Summary and MAR was reviewed with the receiving nurse. Lines:   Peripheral IV 01/08/17 Right Antecubital (Active)   Site Assessment Clean, dry, & intact 1/8/2017  7:32 PM   Phlebitis Assessment 0 1/8/2017  7:32 PM   Infiltration Assessment 0 1/8/2017  7:32 PM   Dressing Status Clean, dry, & intact 1/8/2017  7:32 PM   Dressing Type Transparent 1/8/2017  7:32 PM   Hub Color/Line Status Pink;Flushed 1/8/2017  7:32 PM   Action Taken Dressing reinforced 1/8/2017  7:32 PM       Peripheral IV 01/09/17 Left Hand (Active)   Site Assessment Clean, dry, & intact 1/9/2017  3:25 AM   Phlebitis Assessment 0 1/9/2017  3:25 AM   Infiltration Assessment 0 1/9/2017  3:25 AM   Dressing Type Transparent 1/9/2017  3:25 AM        Opportunity for questions and clarification was provided.

## 2017-01-10 LAB
ANION GAP BLD CALC-SCNC: 9 MMOL/L (ref 5–15)
BACTERIA SPEC CULT: NORMAL
BASOPHILS # BLD AUTO: 0 K/UL (ref 0–0.1)
BASOPHILS # BLD: 0 % (ref 0–1)
BUN SERPL-MCNC: 4 MG/DL (ref 6–20)
BUN/CREAT SERPL: 7 (ref 12–20)
CALCIUM SERPL-MCNC: 8 MG/DL (ref 8.5–10.1)
CC UR VC: NORMAL
CHLORIDE SERPL-SCNC: 102 MMOL/L (ref 97–108)
CO2 SERPL-SCNC: 27 MMOL/L (ref 21–32)
CREAT SERPL-MCNC: 0.59 MG/DL (ref 0.55–1.02)
EOSINOPHIL # BLD: 0.2 K/UL (ref 0–0.4)
EOSINOPHIL NFR BLD: 5 % (ref 0–7)
ERYTHROCYTE [DISTWIDTH] IN BLOOD BY AUTOMATED COUNT: 13.9 % (ref 11.5–14.5)
GLUCOSE SERPL-MCNC: 105 MG/DL (ref 65–100)
HCT VFR BLD AUTO: 33.6 % (ref 35–47)
HGB BLD-MCNC: 11.4 G/DL (ref 11.5–16)
LIPASE SERPL-CCNC: 278 U/L (ref 73–393)
LYMPHOCYTES # BLD AUTO: 24 % (ref 12–49)
LYMPHOCYTES # BLD: 0.8 K/UL (ref 0.8–3.5)
MCH RBC QN AUTO: 33.4 PG (ref 26–34)
MCHC RBC AUTO-ENTMCNC: 33.9 G/DL (ref 30–36.5)
MCV RBC AUTO: 98.5 FL (ref 80–99)
MONOCYTES # BLD: 0.3 K/UL (ref 0–1)
MONOCYTES NFR BLD AUTO: 8 % (ref 5–13)
NEUTS SEG # BLD: 2.1 K/UL (ref 1.8–8)
NEUTS SEG NFR BLD AUTO: 63 % (ref 32–75)
PLATELET # BLD AUTO: 188 K/UL (ref 150–400)
POTASSIUM SERPL-SCNC: 3.8 MMOL/L (ref 3.5–5.1)
RBC # BLD AUTO: 3.41 M/UL (ref 3.8–5.2)
SERVICE CMNT-IMP: NORMAL
SODIUM SERPL-SCNC: 138 MMOL/L (ref 136–145)
WBC # BLD AUTO: 3.3 K/UL (ref 3.6–11)

## 2017-01-10 PROCEDURE — 85025 COMPLETE CBC W/AUTO DIFF WBC: CPT | Performed by: HOSPITALIST

## 2017-01-10 PROCEDURE — 74011000250 HC RX REV CODE- 250: Performed by: HOSPITALIST

## 2017-01-10 PROCEDURE — 74011250637 HC RX REV CODE- 250/637: Performed by: HOSPITALIST

## 2017-01-10 PROCEDURE — 83690 ASSAY OF LIPASE: CPT | Performed by: HOSPITALIST

## 2017-01-10 PROCEDURE — 80048 BASIC METABOLIC PNL TOTAL CA: CPT | Performed by: HOSPITALIST

## 2017-01-10 PROCEDURE — 36415 COLL VENOUS BLD VENIPUNCTURE: CPT | Performed by: HOSPITALIST

## 2017-01-10 PROCEDURE — 65660000000 HC RM CCU STEPDOWN

## 2017-01-10 PROCEDURE — 74011250636 HC RX REV CODE- 250/636: Performed by: FAMILY MEDICINE

## 2017-01-10 PROCEDURE — 74011250637 HC RX REV CODE- 250/637: Performed by: FAMILY MEDICINE

## 2017-01-10 PROCEDURE — C9113 INJ PANTOPRAZOLE SODIUM, VIA: HCPCS | Performed by: HOSPITALIST

## 2017-01-10 PROCEDURE — 74011250636 HC RX REV CODE- 250/636: Performed by: HOSPITALIST

## 2017-01-10 RX ORDER — HYDROMORPHONE HYDROCHLORIDE 1 MG/ML
1 INJECTION, SOLUTION INTRAMUSCULAR; INTRAVENOUS; SUBCUTANEOUS
Status: DISCONTINUED | OUTPATIENT
Start: 2017-01-10 | End: 2017-01-11 | Stop reason: HOSPADM

## 2017-01-10 RX ADMIN — LORAZEPAM 1 MG: 1 TABLET ORAL at 08:27

## 2017-01-10 RX ADMIN — LORAZEPAM 1 MG: 1 TABLET ORAL at 15:03

## 2017-01-10 RX ADMIN — Medication 10 ML: at 19:58

## 2017-01-10 RX ADMIN — HYDROMORPHONE HYDROCHLORIDE 1 MG: 1 INJECTION, SOLUTION INTRAMUSCULAR; INTRAVENOUS; SUBCUTANEOUS at 19:59

## 2017-01-10 RX ADMIN — CIPROFLOXACIN HYDROCHLORIDE 250 MG: 500 TABLET, FILM COATED ORAL at 08:27

## 2017-01-10 RX ADMIN — PROCHLORPERAZINE EDISYLATE 2.5 MG: 5 INJECTION INTRAMUSCULAR; INTRAVENOUS at 19:59

## 2017-01-10 RX ADMIN — HYDROMORPHONE HYDROCHLORIDE 1 MG: 1 INJECTION, SOLUTION INTRAMUSCULAR; INTRAVENOUS; SUBCUTANEOUS at 15:43

## 2017-01-10 RX ADMIN — Medication 1 MG: at 06:15

## 2017-01-10 RX ADMIN — Medication 10 ML: at 06:50

## 2017-01-10 RX ADMIN — SODIUM CHLORIDE 40 MG: 9 INJECTION INTRAMUSCULAR; INTRAVENOUS; SUBCUTANEOUS at 08:27

## 2017-01-10 RX ADMIN — Medication 10 ML: at 15:03

## 2017-01-10 RX ADMIN — PROCHLORPERAZINE EDISYLATE 2.5 MG: 5 INJECTION INTRAMUSCULAR; INTRAVENOUS at 06:18

## 2017-01-10 RX ADMIN — PROCHLORPERAZINE EDISYLATE 2.5 MG: 5 INJECTION INTRAMUSCULAR; INTRAVENOUS at 11:31

## 2017-01-10 RX ADMIN — LORAZEPAM 1 MG: 1 TABLET ORAL at 19:56

## 2017-01-10 RX ADMIN — FOLIC ACID: 5 INJECTION, SOLUTION INTRAMUSCULAR; INTRAVENOUS; SUBCUTANEOUS at 11:40

## 2017-01-10 RX ADMIN — HYDROMORPHONE HYDROCHLORIDE 1 MG: 1 INJECTION, SOLUTION INTRAMUSCULAR; INTRAVENOUS; SUBCUTANEOUS at 11:32

## 2017-01-10 RX ADMIN — PROCHLORPERAZINE EDISYLATE 2.5 MG: 5 INJECTION INTRAMUSCULAR; INTRAVENOUS at 15:43

## 2017-01-10 RX ADMIN — CIPROFLOXACIN HYDROCHLORIDE 250 MG: 500 TABLET, FILM COATED ORAL at 19:57

## 2017-01-10 NOTE — PROGRESS NOTES
Hospitalist Progress Note    NAME: Bryan Vasquez   :  1980   MRN:  [id number]       Interim Hospital Summary: 43 y.o. female whom presented on 2017 with abdominal pain    History of pancreatitis, alcohol abuse and withdrawal, seizure due to withdrawal  [facility], and was discharged on      Assessment / Plan:  Abdominal pain   Secondary to gastritis, underlying pancreatitis treated, with advancement of diet,  Alcohol abuse    Iv hydration  protonix  CIWA protocol ( prone for withdrawals and seizures)  Banana bag  Lipase normal,   CT abd: neg    Pt still with nausea, not able to advance more than liquid diet,  Slowly advance the diet, if not tolerating would ask gi to see  History of EGD and ercp in the past at the [facility]    Lactic acidosis  Repeat lactic acid  UA abnormal with 4+ bacteria,  on cipro for cystitis  Follow the cultures      Code status: full  Prophylaxis: lovenox  Recommended Disposition: tbd     Subjective:     Chief Complaint / Reason for Physician Visit  Nausea, not able to advance the diet  Pain not controlled    Discussed with RN events overnight. Review of Systems:  Symptom Y/N Comments  Symptom Y/N Comments   Fever/Chills n   Chest Pain n    Poor Appetite    Edema     Cough n   Abdominal Pain y    Sputum    Joint Pain     SOB/BRADSHAW n   Pruritis/Rash     Nausea/vomit    Tolerating PT/OT     Diarrhea n   Tolerating Diet     Constipation    Other       Could NOT obtain due to:      Objective:     VITALS:   Last 24hrs VS reviewed since prior progress note.  Most recent are:  Patient Vitals for the past 24 hrs:   Temp Pulse Resp BP SpO2   01/10/17 0759 98.7 °F (37.1 °C) 68 17 109/71 100 %   01/10/17 0428 98.6 °F (37 °C) 77 18 128/79 100 %   01/10/17 0005 98.8 °F (37.1 °C) 88 18 (!) 139/92 99 %   17 2009 97.8 °F (36.6 °C) (!) 124 18 135/83 96 %   17 1656 98.2 °F (36.8 °C) 65 16 129/76 100 % 01/09/17 1206 98.1 °F (36.7 °C) 62 16 132/79 100 %       Intake/Output Summary (Last 24 hours) at 01/10/17 1038  Last data filed at 01/10/17 0428   Gross per 24 hour   Intake          2013.75 ml   Output                0 ml   Net          2013.75 ml        PHYSICAL EXAM:  General: WD, WN. Alert, cooperative, no acute distress    EENT:  EOMI. Anicteric sclerae. MMM  Resp:  CTA bilaterally, no wheezing or rales. No accessory muscle use  CV:  Regular  rhythm,  No edema  GI:  Soft, Non distended, mild diffuse tenderness.  +Bowel sounds  Neurologic:  Alert and oriented X 3, normal speech,   Psych:   Good insight. Not anxious nor agitated  Skin:  No rashes. No jaundice    Reviewed most current lab test results and cultures  YES  Reviewed most current radiology test results   YES  Review and summation of old records today    NO  Reviewed patient's current orders and MAR    YES  PMH/SH reviewed - no change compared to H&P  ________________________________________________________________________  Care Plan discussed with:    Comments   Patient y    Family      RN y    Care Manager     Consultant                        Multidiciplinary team rounds were held today with , nursing, pharmacist and clinical coordinator. Patient's plan of care was discussed; medications were reviewed and discharge planning was addressed. ________________________________________________________________________  Total NON critical care TIME:  30Minutes    Total CRITICAL CARE TIME Spent:   Minutes non procedure based      Comments   >50% of visit spent in counseling and coordination of care     ________________________________________________________________________  Brain Reynoso MD     Procedures: see electronic medical records for all procedures/Xrays and details which were not copied into this note but were reviewed prior to creation of Plan. LABS:  I reviewed today's most current labs and imaging studies.   Pertinent labs include:  Recent Labs      01/10/17   0905 01/08/17   1824   WBC  3.3*  6.0   HGB  11.4*  13.3   HCT  33.6*  38.1   PLT  188  224     Recent Labs      01/10/17   0905 01/08/17   1824   NA  138  140   K  3.8  3.6   CL  102  100   CO2  27  22   GLU  105*  52*   BUN  4*  8   CREA  0.59  0.62   CA  8.0*  9.1   MG   --   1.5*   ALB   --   4.0   TBILI   --   0.5   SGOT   --   73*   ALT   --   43       Signed: Mary Ellen Wyatt MD

## 2017-01-11 VITALS
HEIGHT: 65 IN | SYSTOLIC BLOOD PRESSURE: 128 MMHG | BODY MASS INDEX: 28.25 KG/M2 | DIASTOLIC BLOOD PRESSURE: 84 MMHG | WEIGHT: 169.53 LBS | TEMPERATURE: 98 F | OXYGEN SATURATION: 99 % | RESPIRATION RATE: 18 BRPM | HEART RATE: 78 BPM

## 2017-01-11 LAB
ALBUMIN SERPL BCP-MCNC: 3.2 G/DL (ref 3.5–5)
ALBUMIN/GLOB SERPL: 0.9 {RATIO} (ref 1.1–2.2)
ALP SERPL-CCNC: 85 U/L (ref 45–117)
ALT SERPL-CCNC: 23 U/L (ref 12–78)
ANION GAP BLD CALC-SCNC: 8 MMOL/L (ref 5–15)
AST SERPL W P-5'-P-CCNC: 31 U/L (ref 15–37)
BILIRUB SERPL-MCNC: 0.4 MG/DL (ref 0.2–1)
BUN SERPL-MCNC: 5 MG/DL (ref 6–20)
BUN/CREAT SERPL: 10 (ref 12–20)
CALCIUM SERPL-MCNC: 7.9 MG/DL (ref 8.5–10.1)
CHLORIDE SERPL-SCNC: 106 MMOL/L (ref 97–108)
CO2 SERPL-SCNC: 26 MMOL/L (ref 21–32)
CREAT SERPL-MCNC: 0.52 MG/DL (ref 0.55–1.02)
GLOBULIN SER CALC-MCNC: 3.7 G/DL (ref 2–4)
GLUCOSE SERPL-MCNC: 106 MG/DL (ref 65–100)
LIPASE SERPL-CCNC: 266 U/L (ref 73–393)
POTASSIUM SERPL-SCNC: 3.4 MMOL/L (ref 3.5–5.1)
PROT SERPL-MCNC: 6.9 G/DL (ref 6.4–8.2)
SODIUM SERPL-SCNC: 140 MMOL/L (ref 136–145)

## 2017-01-11 PROCEDURE — 74011250636 HC RX REV CODE- 250/636: Performed by: HOSPITALIST

## 2017-01-11 PROCEDURE — 74011000250 HC RX REV CODE- 250: Performed by: HOSPITALIST

## 2017-01-11 PROCEDURE — C9113 INJ PANTOPRAZOLE SODIUM, VIA: HCPCS | Performed by: HOSPITALIST

## 2017-01-11 PROCEDURE — 74011250637 HC RX REV CODE- 250/637: Performed by: HOSPITALIST

## 2017-01-11 PROCEDURE — 36415 COLL VENOUS BLD VENIPUNCTURE: CPT | Performed by: HOSPITALIST

## 2017-01-11 PROCEDURE — 80053 COMPREHEN METABOLIC PANEL: CPT | Performed by: HOSPITALIST

## 2017-01-11 PROCEDURE — 83690 ASSAY OF LIPASE: CPT | Performed by: HOSPITALIST

## 2017-01-11 PROCEDURE — 74011250637 HC RX REV CODE- 250/637: Performed by: FAMILY MEDICINE

## 2017-01-11 RX ORDER — PHENOL/SODIUM PHENOLATE
20 AEROSOL, SPRAY (ML) MUCOUS MEMBRANE DAILY
Qty: 30 TAB | Refills: 0 | Status: SHIPPED | OUTPATIENT
Start: 2017-01-11 | End: 2017-02-10

## 2017-01-11 RX ORDER — CIPROFLOXACIN 250 MG/1
250 TABLET, FILM COATED ORAL EVERY 12 HOURS
Qty: 2 TAB | Refills: 0 | Status: SHIPPED | OUTPATIENT
Start: 2017-01-11 | End: 2017-01-12

## 2017-01-11 RX ORDER — PROMETHAZINE HYDROCHLORIDE 50 MG/1
50 TABLET ORAL
Qty: 12 TAB | Refills: 0 | Status: SHIPPED | OUTPATIENT
Start: 2017-01-11 | End: 2019-02-02

## 2017-01-11 RX ORDER — POTASSIUM CHLORIDE 750 MG/1
20 TABLET, FILM COATED, EXTENDED RELEASE ORAL
Status: COMPLETED | OUTPATIENT
Start: 2017-01-11 | End: 2017-01-11

## 2017-01-11 RX ORDER — OXYCODONE AND ACETAMINOPHEN 5; 325 MG/1; MG/1
1 TABLET ORAL
Qty: 10 TAB | Refills: 0 | Status: SHIPPED | OUTPATIENT
Start: 2017-01-11 | End: 2019-02-02

## 2017-01-11 RX ORDER — LORAZEPAM 0.5 MG/1
1 TABLET ORAL EVERY 8 HOURS
Qty: 15 TAB | Refills: 0 | Status: SHIPPED | OUTPATIENT
Start: 2017-01-11 | End: 2019-02-02

## 2017-01-11 RX ADMIN — HYDROMORPHONE HYDROCHLORIDE 1 MG: 1 INJECTION, SOLUTION INTRAMUSCULAR; INTRAVENOUS; SUBCUTANEOUS at 04:17

## 2017-01-11 RX ADMIN — LORAZEPAM 1 MG: 1 TABLET ORAL at 04:17

## 2017-01-11 RX ADMIN — FOLIC ACID: 5 INJECTION, SOLUTION INTRAMUSCULAR; INTRAVENOUS; SUBCUTANEOUS at 08:33

## 2017-01-11 RX ADMIN — PROCHLORPERAZINE EDISYLATE 2.5 MG: 5 INJECTION INTRAMUSCULAR; INTRAVENOUS at 13:12

## 2017-01-11 RX ADMIN — LORAZEPAM 1 MG: 1 TABLET ORAL at 08:10

## 2017-01-11 RX ADMIN — LORAZEPAM 1 MG: 1 TABLET ORAL at 13:14

## 2017-01-11 RX ADMIN — CIPROFLOXACIN HYDROCHLORIDE 250 MG: 500 TABLET, FILM COATED ORAL at 08:10

## 2017-01-11 RX ADMIN — Medication 10 ML: at 04:23

## 2017-01-11 RX ADMIN — POTASSIUM CHLORIDE 20 MEQ: 750 TABLET, FILM COATED, EXTENDED RELEASE ORAL at 13:11

## 2017-01-11 RX ADMIN — SODIUM CHLORIDE 40 MG: 9 INJECTION INTRAMUSCULAR; INTRAVENOUS; SUBCUTANEOUS at 08:12

## 2017-01-11 RX ADMIN — Medication 10 ML: at 00:18

## 2017-01-11 RX ADMIN — HYDROMORPHONE HYDROCHLORIDE 1 MG: 1 INJECTION, SOLUTION INTRAMUSCULAR; INTRAVENOUS; SUBCUTANEOUS at 00:18

## 2017-01-11 RX ADMIN — LORAZEPAM 1 MG: 1 TABLET ORAL at 00:17

## 2017-01-11 RX ADMIN — PROCHLORPERAZINE EDISYLATE 2.5 MG: 5 INJECTION INTRAMUSCULAR; INTRAVENOUS at 00:18

## 2017-01-11 RX ADMIN — HYDROMORPHONE HYDROCHLORIDE 1 MG: 1 INJECTION, SOLUTION INTRAMUSCULAR; INTRAVENOUS; SUBCUTANEOUS at 13:12

## 2017-01-11 RX ADMIN — PROCHLORPERAZINE EDISYLATE 2.5 MG: 5 INJECTION INTRAMUSCULAR; INTRAVENOUS at 04:17

## 2017-01-11 RX ADMIN — HYDROMORPHONE HYDROCHLORIDE 1 MG: 1 INJECTION, SOLUTION INTRAMUSCULAR; INTRAVENOUS; SUBCUTANEOUS at 08:10

## 2017-01-11 RX ADMIN — PROCHLORPERAZINE EDISYLATE 2.5 MG: 5 INJECTION INTRAMUSCULAR; INTRAVENOUS at 08:10

## 2017-01-11 NOTE — PROGRESS NOTES
MD request to provide pt with info. On alcohol treatment. Pt is a 43 yr. Old woman adm. With worsening abd. Pain and a hx of recent acute pancreatitis, alcohol abuse, and seizures. Met with pt who was receptive to my giving her info. But she did not contribute much to our conversation. Pt said that she does not have a PCP and she does not have health insurance. I provided pt with info. On community resources including local free clinics and [facility] so that she can establish with a PCP. I also provided pt with resources for alcohol treatment programs should she be interested. Pt is being d/c'd home today and said that she has called someone to transport her home.       Leslie Shell, FLORES  [phone]

## 2017-01-11 NOTE — DISCHARGE SUMMARY
Hospitalist Discharge Summary     Patient ID:  Arturo Clifton  [id number]  56 y.o.  1980    PCP on record: None    Admit date: 1/8/2017  Discharge date and time: 1/11/2017      DISCHARGE DIAGNOSIS:  Abdominal pain  Alcohol abuse  utirecent pancreatitis  uti poa      CONSULTATIONS:  None    Excerpted HPI from H&P of Bacilio Hogan MD:  Citlali Dickens is a 43 y.o.  female who presents with abdominal pain. Pt with h/o pancreatitis, alcohol abuse, withdrawal ,seizures due to withdrawal comes with above. Per pt she had been admitted to [facility] last Days for acute pancreatitis and was discharged yday. She went home and had tried to eat something but had nausea. She drank a shot of alcohol and the pain worsened and she had to come here. In ED, lactate 2.2, UA with 3+ bacteria, no leuc esterase, lipase 275, mag 1.5, /110     We were asked to admit for work up and evaluation of the above problems.        ______________________________________________________________________  DISCHARGE SUMMARY/HOSPITAL COURSE:  for full details see H&P, daily progress notes, labs, consult notes.      History of pancreatitis, alcohol abuse and withdrawal, seizure due to withdrawal  Recent admission to [facility] few days PTA for acute pancreatitis, and was discharged on 1/7    Went home started drinking alcohol and ate fatty food    Back in the hospital        Abdominal pain   Secondary to gastritis, underlying pancreatitis treated, Alcohol abuse   home started drinking alcohol and ate fatty food    Slowly improved with ivf npo and pain management  Lipase normal,   CT abd: neg     Ready to be discharged today  Had a long talk discussing about the pancreatitis, to be on lite diet,  And not to drink alcohol, given couple of pills of ativan  Spoke with CM to give information for alcohol detox programns    History of EGD and ercp in the past at the [facility]     Uti: simple cystitis  Complete the treatment    _______________________________________________________________________  Patient seen and examined by me on discharge day. Pertinent Findings:  Gen:    Not in distress  Chest: Clear lungs  CVS:   Regular rhythm. No edema  Abd:  Soft, not distended, not tender  Neuro:  Alert, and oriented  _______________________________________________________________________  DISCHARGE MEDICATIONS:   Current Discharge Medication List      START taking these medications    Details   ciprofloxacin HCl (CIPRO) 250 mg tablet Take 1 Tab by mouth every twelve (12) hours for 1 day. Qty: 2 Tab, Refills: 0      Omeprazole delayed release (PRILOSEC D/R) 20 mg tablet Take 1 Tab by mouth daily for 30 days. Qty: 30 Tab, Refills: 0      LORazepam (ATIVAN) 0.5 mg tablet Take 2 Tabs by mouth every eight (8) hours. Max Daily Amount: 3 mg. Qty: 15 Tab, Refills: 0      promethazine (PHENERGAN) 50 mg tablet Take 1 Tab by mouth every six (6) hours as needed for Nausea. Qty: 12 Tab, Refills: 0         CONTINUE these medications which have NOT CHANGED    Details   multivitamin (ONE A DAY) tablet Take 1 Tab by mouth daily. STOP taking these medications       ondansetron (ZOFRAN ODT) 4 mg disintegrating tablet Comments:   Reason for Stopping:         magnesium 250 mg tab Comments:   Reason for Stopping:               My Recommended Diet, Activity, Wound Care, and follow-up labs are listed in the patient's Discharge Insturctions which I have personally completed and reviewed.     _______________________________________________________________________  DISPOSITION:    Home with Family: y   Home with HH/PT/OT/RN:    SNF/LTC:    SHAUNA:    OTHER:        Condition at Discharge:  Stable  _______________________________________________________________________  Follow up with:   PCP : None  Follow-up Information     Follow up With Details Comments Contact Info    [facility]  If symptoms worsen, As needed [address] [address]  [phone]    [facility]   [address] [place] [address]  [phone]    None   None (262) Patient stated that they have no PCP                Total time in minutes spent coordinating this discharge (includes going over instructions, follow-up, prescriptions, and preparing report for sign off to her PCP) :  35 minutes    Signed:  Mejia Shrestha MD

## 2017-01-11 NOTE — PROGRESS NOTES
End of Shift Nursing Note    Bedside shift change report given to Fariba Faust RN (oncoming nurse) by Elizabeth Rios RN (offgoing nurse). Report included the following information SBAR, Intake/Output, MAR and Recent Results. Zone Phone:   none    Significant changes during shift:    none   Non-emergent issues for physician to address:   none     Number times ambulated in hallway past shift: 2      Number of times OOB to chair past shift: 1         Vital Signs:    Temp: 98.5 °F (36.9 °C)     Pulse (Heart Rate): 87     BP: 130/84     Resp Rate: 18     O2 Sat (%): 97 %    Lines & Drains:     Urinary Catheter? NO      NG tube [] in [] removed [x] not applicable   Drains [] in [] removed [x] not applicable     Skin Integrity:      Wounds: no   Dressings Present: no    Wound Concerns: no      GI:    Current diet:  DIET GI LITE (POST SURGICAL)    Nausea: YES  Vomiting: NO  Bowel Sounds: YES  Flatus: YES  Last Bowel Movement: yesterday      Respiratory:  Supplemental O2: NO        Patient Safety:    Falls Score: 3  Mobility Score: 0        Opportunity for questions and clarification was given to oncoming nurse. Patient bed is in low position, side rails are up x 2, door & observation blinds open as needed, call bell within reach and patient not in distress.     Chayito Milian RN

## 2017-01-11 NOTE — PROGRESS NOTES
I have discussed and reviewed discharge instructions with the patient. The patient verbalized understanding. The pt denies any further questions at this time. Pt discharged with instructions, 5 prescriptions, and personal belongings. One peripheral IV has been removed.

## 2017-04-20 ENCOUNTER — HOSPITAL ENCOUNTER (EMERGENCY)
Age: 37
Discharge: HOME | End: 2017-04-21
Attending: INTERNAL MEDICINE | Admitting: INTERNAL MEDICINE
Payer: SELF-PAY

## 2017-04-20 VITALS
HEIGHT: 65 IN | DIASTOLIC BLOOD PRESSURE: 82 MMHG | WEIGHT: 175 LBS | OXYGEN SATURATION: 98 % | HEART RATE: 106 BPM | TEMPERATURE: 98.1 F | BODY MASS INDEX: 29.16 KG/M2 | RESPIRATION RATE: 20 BRPM | SYSTOLIC BLOOD PRESSURE: 134 MMHG

## 2017-04-20 DIAGNOSIS — K29.90 GASTRITIS AND DUODENITIS: Primary | ICD-10-CM

## 2017-04-20 DIAGNOSIS — F10.10 ALCOHOL ABUSE: ICD-10-CM

## 2017-04-20 LAB
ALBUMIN SERPL BCP-MCNC: 3.9 G/DL (ref 3.5–5)
ALBUMIN/GLOB SERPL: 0.9 {RATIO} (ref 1.1–2.2)
ALP SERPL-CCNC: 107 U/L (ref 45–117)
ALT SERPL-CCNC: 62 U/L (ref 12–78)
AMYLASE SERPL-CCNC: 87 U/L (ref 25–115)
ANION GAP BLD CALC-SCNC: 13 MMOL/L (ref 5–15)
APPEARANCE UR: CLEAR
AST SERPL W P-5'-P-CCNC: 150 U/L (ref 15–37)
BACTERIA URNS QL MICRO: NEGATIVE /HPF
BASOPHILS # BLD AUTO: 0 K/UL (ref 0–0.1)
BASOPHILS # BLD: 0 % (ref 0–1)
BILIRUB SERPL-MCNC: 0.3 MG/DL (ref 0.2–1)
BILIRUB UR QL: NEGATIVE
BUN SERPL-MCNC: 10 MG/DL (ref 6–20)
BUN/CREAT SERPL: 20 (ref 12–20)
CALCIUM SERPL-MCNC: 8.6 MG/DL (ref 8.5–10.1)
CHLORIDE SERPL-SCNC: 103 MMOL/L (ref 97–108)
CO2 SERPL-SCNC: 28 MMOL/L (ref 21–32)
COLOR UR: ABNORMAL
CREAT SERPL-MCNC: 0.51 MG/DL (ref 0.55–1.02)
DIFFERENTIAL METHOD BLD: NORMAL
EOSINOPHIL # BLD: 0.2 K/UL (ref 0–0.4)
EOSINOPHIL NFR BLD: 3 % (ref 0–7)
EPITH CASTS URNS QL MICRO: ABNORMAL /LPF
ERYTHROCYTE [DISTWIDTH] IN BLOOD BY AUTOMATED COUNT: 14.5 % (ref 11.5–14.5)
GLOBULIN SER CALC-MCNC: 4.4 G/DL (ref 2–4)
GLUCOSE SERPL-MCNC: 85 MG/DL (ref 65–100)
GLUCOSE UR STRIP.AUTO-MCNC: NEGATIVE MG/DL
HCG UR QL: NEGATIVE
HCT VFR BLD AUTO: 36.8 % (ref 35–47)
HGB BLD-MCNC: 12.6 G/DL (ref 11.5–16)
HGB UR QL STRIP: ABNORMAL
KETONES UR QL STRIP.AUTO: NEGATIVE MG/DL
LEUKOCYTE ESTERASE UR QL STRIP.AUTO: NEGATIVE
LIPASE SERPL-CCNC: 77 U/L (ref 73–393)
LYMPHOCYTES # BLD AUTO: 22 % (ref 12–49)
LYMPHOCYTES # BLD: 1.2 K/UL (ref 0.8–3.5)
MAGNESIUM SERPL-MCNC: 1.5 MG/DL (ref 1.6–2.4)
MCH RBC QN AUTO: 32.5 PG (ref 26–34)
MCHC RBC AUTO-ENTMCNC: 34.2 G/DL (ref 30–36.5)
MCV RBC AUTO: 94.8 FL (ref 80–99)
MONOCYTES # BLD: 0.6 K/UL (ref 0–1)
MONOCYTES NFR BLD AUTO: 11 % (ref 5–13)
NEUTS SEG # BLD: 3.6 K/UL (ref 1.8–8)
NEUTS SEG NFR BLD AUTO: 64 % (ref 32–75)
NITRITE UR QL STRIP.AUTO: NEGATIVE
PH UR STRIP: 6.5 [PH] (ref 5–8)
PLATELET # BLD AUTO: 239 K/UL (ref 150–400)
POTASSIUM SERPL-SCNC: 3.4 MMOL/L (ref 3.5–5.1)
PROT SERPL-MCNC: 8.3 G/DL (ref 6.4–8.2)
PROT UR STRIP-MCNC: NEGATIVE MG/DL
RBC # BLD AUTO: 3.88 M/UL (ref 3.8–5.2)
RBC #/AREA URNS HPF: ABNORMAL /HPF (ref 0–5)
RBC MORPH BLD: NORMAL
SODIUM SERPL-SCNC: 144 MMOL/L (ref 136–145)
SP GR UR REFRACTOMETRY: <1.005 (ref 1–1.03)
UA: UC IF INDICATED,UAUC: ABNORMAL
UROBILINOGEN UR QL STRIP.AUTO: 1 EU/DL (ref 0.2–1)
WBC # BLD AUTO: 5.6 K/UL (ref 3.6–11)
WBC URNS QL MICRO: ABNORMAL /HPF (ref 0–4)

## 2017-04-20 PROCEDURE — 36415 COLL VENOUS BLD VENIPUNCTURE: CPT | Performed by: INTERNAL MEDICINE

## 2017-04-20 PROCEDURE — 80053 COMPREHEN METABOLIC PANEL: CPT | Performed by: INTERNAL MEDICINE

## 2017-04-20 PROCEDURE — 96375 TX/PRO/DX INJ NEW DRUG ADDON: CPT

## 2017-04-20 PROCEDURE — 83690 ASSAY OF LIPASE: CPT | Performed by: INTERNAL MEDICINE

## 2017-04-20 PROCEDURE — 96374 THER/PROPH/DIAG INJ IV PUSH: CPT

## 2017-04-20 PROCEDURE — 74011250636 HC RX REV CODE- 250/636: Performed by: INTERNAL MEDICINE

## 2017-04-20 PROCEDURE — 96361 HYDRATE IV INFUSION ADD-ON: CPT

## 2017-04-20 PROCEDURE — 81025 URINE PREGNANCY TEST: CPT

## 2017-04-20 PROCEDURE — 74011000250 HC RX REV CODE- 250: Performed by: INTERNAL MEDICINE

## 2017-04-20 PROCEDURE — 82150 ASSAY OF AMYLASE: CPT | Performed by: INTERNAL MEDICINE

## 2017-04-20 PROCEDURE — 83735 ASSAY OF MAGNESIUM: CPT | Performed by: INTERNAL MEDICINE

## 2017-04-20 PROCEDURE — 85025 COMPLETE CBC W/AUTO DIFF WBC: CPT | Performed by: INTERNAL MEDICINE

## 2017-04-20 PROCEDURE — 81001 URINALYSIS AUTO W/SCOPE: CPT | Performed by: INTERNAL MEDICINE

## 2017-04-20 PROCEDURE — 99283 EMERGENCY DEPT VISIT LOW MDM: CPT

## 2017-04-20 RX ORDER — MORPHINE SULFATE 2 MG/ML
4 INJECTION, SOLUTION INTRAMUSCULAR; INTRAVENOUS
Status: COMPLETED | OUTPATIENT
Start: 2017-04-20 | End: 2017-04-20

## 2017-04-20 RX ORDER — SODIUM CHLORIDE 0.9 % (FLUSH) 0.9 %
10 SYRINGE (ML) INJECTION AS NEEDED
Status: DISCONTINUED | OUTPATIENT
Start: 2017-04-20 | End: 2017-04-21 | Stop reason: HOSPADM

## 2017-04-20 RX ORDER — FAMOTIDINE 10 MG/ML
20 INJECTION INTRAVENOUS
Status: COMPLETED | OUTPATIENT
Start: 2017-04-20 | End: 2017-04-20

## 2017-04-20 RX ORDER — FAMOTIDINE 20 MG/1
20 TABLET, FILM COATED ORAL 2 TIMES DAILY
Qty: 20 TAB | Refills: 0 | Status: SHIPPED | OUTPATIENT
Start: 2017-04-20 | End: 2017-04-30

## 2017-04-20 RX ORDER — ONDANSETRON 2 MG/ML
4 INJECTION INTRAMUSCULAR; INTRAVENOUS
Status: COMPLETED | OUTPATIENT
Start: 2017-04-20 | End: 2017-04-20

## 2017-04-20 RX ADMIN — SODIUM CHLORIDE 1000 ML: 900 INJECTION, SOLUTION INTRAVENOUS at 23:06

## 2017-04-20 RX ADMIN — Medication 10 ML: at 23:09

## 2017-04-20 RX ADMIN — Medication 10 ML: at 23:10

## 2017-04-20 RX ADMIN — Medication 4 MG: at 23:10

## 2017-04-20 RX ADMIN — FAMOTIDINE 20 MG: 10 INJECTION INTRAVENOUS at 23:08

## 2017-04-20 RX ADMIN — ONDANSETRON HYDROCHLORIDE 4 MG: 2 INJECTION, SOLUTION INTRAMUSCULAR; INTRAVENOUS at 23:08

## 2017-04-21 NOTE — ED NOTES
.See Nursing Assessment      Emergency Department Nursing Plan of Care       The Nursing Plan of Care is developed from the Nursing assessment and Emergency Department Attending provider initial evaluation. The plan of care may be reviewed in the ED Provider note.     The Plan of Care was developed with the following considerations:   Patient / Family readiness to learn indicated by:verbalized understanding  Persons(s) to be included in education: patient  Barriers to Learning/Limitations:No    Signed     Carlos Ferraro RN    4/20/2017   10:30 PM

## 2017-04-21 NOTE — ED NOTES
Verbal shift change report given to Silvia Peraza (oncoming nurse) by Jerrica Bland (offgoing nurse). Report included the following information SBAR, ED Summary and Procedure Summary.

## 2017-04-21 NOTE — DISCHARGE INSTRUCTIONS
Learning About Alcohol Misuse  What is alcohol misuse? Alcohol misuse means drinking so much that it causes problems for you or others. Early problems with alcohol can start at home. You may argue with loved ones about how much you're drinking. Your job may be affected because of drinking. You may drink when it's dangerous or illegal, such as when you drive. Drinking too much for a long time can lead to health conditions like high blood pressure and liver problems. What are the symptoms? Symptoms of alcohol misuse may include:  · Drinking much more than you planned. · Drinking even though it's causing problems for you or others. · Putting yourself in situations where you might get hurt. · Wanting to cut down or stop drinking, but not being able to. · Feeling guilty about how much you're drinking. How is alcohol misuse treated? Getting help for problems with alcohol is up to you. But you don't have to do it alone. There are many people and kinds of treatments to help with alcohol problems. Talking to your doctor is the first step. When you get a doctor's help, treatment for alcohol problems can be safer and quicker. Treatment options can include:  · Treatment programs. Examples are group therapy, one or more types of counseling, and alcohol education. · Medicines. A doctor or counselor can help you know what kinds of medicines might help with cravings. · Free social support groups. These groups include AA (Alcoholics Anonymous) and SMART (Self-Management and Recovery Training). Your doctor can help you decide which type of program is best for you. Follow-up care is a key part of your treatment and safety. Be sure to make and go to all appointments, and call your doctor if you are having problems. It's also a good idea to know your test results and keep a list of the medicines you take. Where can you learn more? Go to http://willy-yenny.info/.   Enter [id number] in the search box to learn more about \"Learning About Alcohol Misuse. \"  Current as of: January 3, 2017  Content Version: 11.2  © 6747-2601 [employer]. Care instructions adapted under license by [employer] (which disclaims liability or warranty for this information). If you have questions about a medical condition or this instruction, always ask your healthcare professional. [address] any warranty or liability for your use of this information. Misuse of Multiple Drugs: Care Instructions  Your Care Instructions  You have had treatment to help your body get rid of a combination of any of these drugs:  · Prescription medicines  · Over-the-counter medicines  · Alcohol  · Illegal drugs  Taking some drugs together may cause a bad reaction. They can have unexpected or stronger effects on your body and mind. For example, benzodiazepines (such as alprazolam and lorazepam) and alcohol both depress the nervous system. Taken together, each one is stronger than when it is taken by itself. You are getting better, but it takes time for the drugs to leave your body. It may take up to 2 weeks for your mind to clear and your mood to improve. Depending on the drugs you took, the doctor might have:  · Watched your symptoms or done tests to find out what drugs were in your body. · Treated you to control your breathing, blood pressure, and heart rate. · Tried to remove the drugs from your body by pumping your stomach or giving you a substance by mouth that absorbs chemicals. · Given you a substance that neutralizes chemicals (antidote). · Given you oxygen to help you breathe. · Given you fluids. The doctor also watched you carefully to make sure you were recovering safely. Follow-up care is a key part of your treatment and safety. Be sure to make and go to all appointments, and call your doctor if you are having problems.  It's also a good idea to know your test results and keep a list of the medicines you take. How can you care for yourself at home? · When you take substances like alcohol and some drugs regularly, your body gets used to them. This is called dependency. If you are dependent on them, you may have withdrawal symptoms when you stop taking them. These symptoms may include trembling, feeling restless, and sweating. To help get past these:  ¨ Get plenty of rest.  ¨ Drink lots of fluids. ¨ Stay active. ¨ Eat a healthy diet. · If you had a tube in your throat to help you breathe, you may have a sore throat or hoarseness that can last a few days. Drinking fluids may help soothe your throat. Get help to stop using drugs. Talk to your doctor about drug and alcohol counseling programs. When should you call for help? Call 911 anytime you think you may need emergency care. For example, call if:  · You feel you cannot stop from hurting yourself or someone else. Call your doctor now or seek immediate medical care if:  · You have new or worse symptoms of withdrawal, such as trembling, feeling restless, and sweating, that you can't manage at home. Watch closely for changes in your health, and be sure to contact your doctor if:  · You do not get better as expected. · You need help finding the right place to get help with drug or alcohol problems. Where can you learn more? Go to http://willy-yenny.info/. Enter B109 in the search box to learn more about \"Misuse of Multiple Drugs: Care Instructions. \"  Current as of: November 3, 2016  Content Version: 11.2  © 3003-7296 [employer]. Care instructions adapted under license by [employer] (which disclaims liability or warranty for this information). If you have questions about a medical condition or this instruction, always ask your healthcare professional. [address] any warranty or liability for your use of this information.

## 2017-04-21 NOTE — ED PROVIDER NOTES
HPI Comments: Jayla Bliss is a 43 y.o. female with pertinent PMHx of endometriosis, TTP, and pancreatitis presenting ambulatory to the ED c/o 7/10 upper abdominal pain x yesterday. Pt states that her current pain feels like her hx of pancreatitis. Pt notes associated symptoms of nausea and vomiting, with 3-4 episodes today. Pt states that she has been drinking alcohol regularly, specifically vodka. Pt specifically denies any hematemesis. Pt also c/o post nasal drainage and sore throat x ~1 month. Pt states \"I just haven't been feeling well\". Pt denies any fever/chills. PCP: None  Social Hx: + tobacco use, + alcohol use, - illicit drug use    There are no other complaints, changes, or physical findings at this time. The history is provided by the patient. No  was used. Past Medical History:   Diagnosis Date    Endometriosis     Ill-defined condition     meningitis    Liver mass 2016    small liver mass found per pt during last hospital stay    Pancreatitis     Seizures ([address]     due to alcohol withdrawal       Past Surgical History:   Procedure Laterality Date    COLPOSCOPY      HX CHOLECYSTECTOMY      HX LEEP PROCEDURE      LIVER SURGERY PROCEDURE UNLISTED  2004    \"Half of liver removed\"          No family history on file. Social History     Social History    Marital status:      Spouse name: N/A    Number of children: N/A    Years of education: N/A     Occupational History    Not on file.      Social History Main Topics    Smoking status: Current Every Day Smoker     Packs/day: 0.50    Smokeless tobacco: Not on file    Alcohol use 3.5 oz/week     7 Cans of beer per week      Comment: socially; wine daily since last week, drinks around 1L vodka a day, last drink 5/11/16    Drug use: No    Sexual activity: Yes     Partners: Male     Birth control/ protection: None     Other Topics Concern    Not on file     Social History Narrative ALLERGIES: Codeine and Penicillins    Review of Systems   Constitutional: Negative. Negative for chills and fever. HENT: Positive for postnasal drip and sore throat. Eyes: Negative. Respiratory: Negative. Negative for cough, shortness of breath and wheezing. Cardiovascular: Negative. Negative for chest pain. Gastrointestinal: Positive for abdominal pain (upper), nausea and vomiting. Negative for diarrhea. Genitourinary: Negative. Negative for difficulty urinating, dysuria and vaginal pain. Skin: Negative. Neurological: Negative. Psychiatric/Behavioral: Negative. All other systems reviewed and are negative. Vitals:    04/20/17 2137   BP: 134/82   Pulse: (!) 106   Resp: 20   Temp: 98.1 °F (36.7 °C)   SpO2: 98%   Weight: 79.4 kg (175 lb)   Height: 5' 5\" (1.651 m)            Physical Exam   Constitutional: She is oriented to person, place, and time. She appears well-developed and well-nourished. HENT:   Head: Normocephalic and atraumatic. Mouth/Throat: Oropharynx is clear and moist.   Eyes: Conjunctivae and EOM are normal. Pupils are equal, round, and reactive to light. Neck: Normal range of motion. Neck supple. Cardiovascular: Normal rate, regular rhythm and normal heart sounds. Exam reveals no gallop and no friction rub. No murmur heard. Pulmonary/Chest: Effort normal and breath sounds normal. No respiratory distress. She has no wheezes. She has no rales. Abdominal: Soft. Bowel sounds are normal. She exhibits no distension. There is tenderness (epigastric TTP). There is no rebound and no guarding. Musculoskeletal: Normal range of motion. She exhibits no edema or tenderness. Lymphadenopathy:     She has no cervical adenopathy. Neurological: She is alert and oriented to person, place, and time. She has normal strength. No cranial nerve deficit or sensory deficit. She displays a negative Romberg sign.  Coordination and gait normal.   Skin: Skin is warm and dry. No ecchymosis, no lesion and no rash noted. Rash is not urticarial. She is not diaphoretic. No erythema. Psychiatric: She has a normal mood and affect. Nursing note and vitals reviewed. MDM  Number of Diagnoses or Management Options  Alcohol abuse:   Gastritis and duodenitis:   Diagnosis management comments: DDx: pancreatitis, URI, seasonal allergies, substance abuse       Amount and/or Complexity of Data Reviewed  Clinical lab tests: ordered and reviewed  Review and summarize past medical records: yes    Patient Progress  Patient progress: stable    ED Course       Procedures   LABORATORY TESTS:  Recent Results (from the past 12 hour(s))   LIPASE    Collection Time: 04/20/17 10:54 PM   Result Value Ref Range    Lipase 77 73 - 393 U/L   AMYLASE    Collection Time: 04/20/17 10:54 PM   Result Value Ref Range    Amylase 87 25 - 819 U/L   METABOLIC PANEL, COMPREHENSIVE    Collection Time: 04/20/17 10:54 PM   Result Value Ref Range    Sodium 144 136 - 145 mmol/L    Potassium 3.4 (L) 3.5 - 5.1 mmol/L    Chloride 103 97 - 108 mmol/L    CO2 28 21 - 32 mmol/L    Anion gap 13 5 - 15 mmol/L    Glucose 85 65 - 100 mg/dL    BUN 10 6 - 20 MG/DL    Creatinine 0.51 (L) 0.55 - 1.02 MG/DL    BUN/Creatinine ratio 20 12 - 20      GFR est AA >60 >60 ml/min/1.73m2    GFR est non-AA >60 >60 ml/min/1.73m2    Calcium 8.6 8.5 - 10.1 MG/DL    Bilirubin, total 0.3 0.2 - 1.0 MG/DL    ALT (SGPT) 62 12 - 78 U/L    AST (SGOT) 150 (H) 15 - 37 U/L    Alk.  phosphatase 107 45 - 117 U/L    Protein, total 8.3 (H) 6.4 - 8.2 g/dL    Albumin 3.9 3.5 - 5.0 g/dL    Globulin 4.4 (H) 2.0 - 4.0 g/dL    A-G Ratio 0.9 (L) 1.1 - 2.2     MAGNESIUM    Collection Time: 04/20/17 10:54 PM   Result Value Ref Range    Magnesium 1.5 (L) 1.6 - 2.4 mg/dL   CBC WITH AUTOMATED DIFF    Collection Time: 04/20/17 10:54 PM   Result Value Ref Range    WBC 5.6 3.6 - 11.0 K/uL    RBC 3.88 3.80 - 5.20 M/uL    HGB 12.6 11.5 - 16.0 g/dL    HCT 36.8 35.0 - 47.0 %    MCV 94.8 80.0 - 99.0 FL    MCH 32.5 26.0 - 34.0 PG    MCHC 34.2 30.0 - 36.5 g/dL    RDW 14.5 11.5 - 14.5 %    PLATELET 779 565 - 414 K/uL    NEUTROPHILS 64 32 - 75 %    LYMPHOCYTES 22 12 - 49 %    MONOCYTES 11 5 - 13 %    EOSINOPHILS 3 0 - 7 %    BASOPHILS 0 0 - 1 %    ABS. NEUTROPHILS 3.6 1.8 - 8.0 K/UL    ABS. LYMPHOCYTES 1.2 0.8 - 3.5 K/UL    ABS. MONOCYTES 0.6 0.0 - 1.0 K/UL    ABS. EOSINOPHILS 0.2 0.0 - 0.4 K/UL    ABS. BASOPHILS 0.0 0.0 - 0.1 K/UL    DF SMEAR SCANNED      RBC COMMENTS ANISOCYTOSIS  1+       URINALYSIS W/ REFLEX CULTURE    Collection Time: 04/20/17 11:02 PM   Result Value Ref Range    Color YELLOW/STRAW      Appearance CLEAR CLEAR      Specific gravity <1.005 1.003 - 1.030    pH (UA) 6.5 5.0 - 8.0      Protein NEGATIVE  NEG mg/dL    Glucose NEGATIVE  NEG mg/dL    Ketone NEGATIVE  NEG mg/dL    Bilirubin NEGATIVE  NEG      Blood SMALL (A) NEG      Urobilinogen 1.0 0.2 - 1.0 EU/dL    Nitrites NEGATIVE  NEG      Leukocyte Esterase NEGATIVE  NEG      WBC 0-4 0 - 4 /hpf    RBC 5-10 0 - 5 /hpf    Epithelial cells FEW FEW /lpf    Bacteria NEGATIVE  NEG /hpf    UA:UC IF INDICATED CULTURE NOT INDICATED BY UA RESULT CNI     HCG URINE, QL. - POC    Collection Time: 04/20/17 11:14 PM   Result Value Ref Range    Pregnancy test,urine (POC) NEGATIVE  NEG       MEDICATIONS GIVEN:  Medications   sodium chloride 0.9 % bolus infusion 1,000 mL (1,000 mL IntraVENous New Bag 4/20/17 2306)   sodium chloride (NS) flush 10 mL (10 mL IntraVENous Given 4/20/17 2310)   morphine injection 4 mg (4 mg IntraVENous Given 4/20/17 2310)   ondansetron (ZOFRAN) injection 4 mg (4 mg IntraVENous Given 4/20/17 2308)   famotidine (PF) (PEPCID) injection 20 mg (20 mg IntraVENous Given 4/20/17 2308)       IMPRESSION:  1. Gastritis and duodenitis    2. Alcohol abuse        PLAN:  1. Current Discharge Medication List        2.    Follow-up Information     Follow up With Details Comments Contact Info    None In 2 days If symptoms worsen None (395) Patient stated that they have no PCP      None In 2 days  None (395) Patient stated that they have no PCP          Return to ED if worse   DISCHARGE NOTE:  11:44 PM  The patient is ready for discharge. The patient's signs, symptoms, diagnosis, and discharge instructions have been discussed and the patient and/or family has conveyed their understanding. The patient and/or family is to follow up as recommended or return to the ER should their symptoms worsen. Plan has been discussed and the patient and/or family is in agreement. Written by Arvin Rangel, ED Scribe, as dictated by Regina Lan MD.     Attestation: This note is prepared by Andrae Limon. Darren Rangel, acting as Scribe for Regina Lan MD.    Regina Lan MD: The scribe's documentation has been prepared under my direction and personally reviewed by me in its entirety. I confirm that the note above accurately reflects all work, treatment, procedures, and medical decision making performed by me.

## 2017-04-21 NOTE — ED NOTES
Discharge instructions were given to the patient by Jonathan Navarro RN. The patient left the Emergency Department ambulatory, alert and oriented and in no acute distress with 1 prescription. The patient was encouraged to call or return to the ED for worsening issues or problems and was encouraged to schedule a follow up appointment for continuing care. The patient verbalized understanding of discharge instructions and prescriptions, all questions were answered. The patient has no further concerns at this time.

## 2017-04-21 NOTE — ED TRIAGE NOTES
Pt states she is trying to quit drinking alcohol, pt states no alcohol in 3 days. Pt states she started having tremors and had approx 5 shots today around 7 to stop the tremors.

## 2017-04-21 NOTE — ED NOTES
Patient has been instructed that they have been given morphine 4mg IV* which contains opioids, benzodiazepines, or other sedating drugs. Patient is aware that they  will need to refrain from driving or operating heavy machinery after taking this medication. Patient also instructed that they need to avoid drinking alcohol and using other products containing opioids, benzodiazepines, or other sedating drugs. Patient verbalized understanding.

## 2019-02-02 ENCOUNTER — HOSPITAL ENCOUNTER (EMERGENCY)
Age: 39
Discharge: HOME | End: 2019-02-02
Attending: EMERGENCY MEDICINE
Payer: SELF-PAY

## 2019-02-02 VITALS
OXYGEN SATURATION: 97 % | SYSTOLIC BLOOD PRESSURE: 110 MMHG | DIASTOLIC BLOOD PRESSURE: 64 MMHG | RESPIRATION RATE: 16 BRPM | HEIGHT: 65 IN | BODY MASS INDEX: 30.49 KG/M2 | TEMPERATURE: 98.6 F | WEIGHT: 183 LBS | HEART RATE: 70 BPM

## 2019-02-02 DIAGNOSIS — R10.13 ABDOMINAL PAIN, EPIGASTRIC: Primary | ICD-10-CM

## 2019-02-02 DIAGNOSIS — R11.2 NAUSEA AND VOMITING, INTRACTABILITY OF VOMITING NOT SPECIFIED, UNSPECIFIED VOMITING TYPE: ICD-10-CM

## 2019-02-02 DIAGNOSIS — E16.2 HYPOGLYCEMIA: ICD-10-CM

## 2019-02-02 LAB
ALBUMIN SERPL-MCNC: 3.6 G/DL (ref 3.4–5)
ALBUMIN/GLOB SERPL: 0.8 {RATIO} (ref 0.8–1.7)
ALP SERPL-CCNC: 143 U/L (ref 45–117)
ALT SERPL-CCNC: 22 U/L (ref 13–56)
ANION GAP SERPL CALC-SCNC: 12 MMOL/L (ref 3–18)
APPEARANCE UR: CLEAR
AST SERPL-CCNC: 30 U/L (ref 15–37)
BACTERIA URNS QL MICRO: ABNORMAL /HPF
BASOPHILS # BLD: 0 K/UL (ref 0–0.1)
BASOPHILS NFR BLD: 0 % (ref 0–2)
BILIRUB SERPL-MCNC: 0.4 MG/DL (ref 0.2–1)
BILIRUB UR QL: NEGATIVE
BUN SERPL-MCNC: 12 MG/DL (ref 7–18)
BUN/CREAT SERPL: 16 (ref 12–20)
CALCIUM SERPL-MCNC: 8.3 MG/DL (ref 8.5–10.1)
CHLORIDE SERPL-SCNC: 103 MMOL/L (ref 100–108)
CO2 SERPL-SCNC: 24 MMOL/L (ref 21–32)
COLOR UR: ABNORMAL
CREAT SERPL-MCNC: 0.73 MG/DL (ref 0.6–1.3)
DIFFERENTIAL METHOD BLD: ABNORMAL
EOSINOPHIL # BLD: 0 K/UL (ref 0–0.4)
EOSINOPHIL NFR BLD: 1 % (ref 0–5)
EPITH CASTS URNS QL MICRO: ABNORMAL /LPF (ref 0–5)
ERYTHROCYTE [DISTWIDTH] IN BLOOD BY AUTOMATED COUNT: 16.1 % (ref 11.6–14.5)
ETHANOL SERPL-MCNC: 215 MG/DL (ref 0–3)
GLOBULIN SER CALC-MCNC: 4.5 G/DL (ref 2–4)
GLUCOSE BLD STRIP.AUTO-MCNC: 86 MG/DL (ref 70–110)
GLUCOSE SERPL-MCNC: 46 MG/DL (ref 74–99)
GLUCOSE UR STRIP.AUTO-MCNC: NEGATIVE MG/DL
HCG UR QL: NEGATIVE
HCT VFR BLD AUTO: 36.2 % (ref 35–45)
HGB BLD-MCNC: 12.5 G/DL (ref 12–16)
HGB UR QL STRIP: ABNORMAL
KETONES UR QL STRIP.AUTO: 40 MG/DL
LEUKOCYTE ESTERASE UR QL STRIP.AUTO: NEGATIVE
LIPASE SERPL-CCNC: 42 U/L (ref 73–393)
LYMPHOCYTES # BLD: 1.2 K/UL (ref 0.9–3.6)
LYMPHOCYTES NFR BLD: 20 % (ref 21–52)
MCH RBC QN AUTO: 29.1 PG (ref 24–34)
MCHC RBC AUTO-ENTMCNC: 34.5 G/DL (ref 31–37)
MCV RBC AUTO: 84.2 FL (ref 74–97)
MONOCYTES # BLD: 0.3 K/UL (ref 0.05–1.2)
MONOCYTES NFR BLD: 5 % (ref 3–10)
NEUTS SEG # BLD: 4.5 K/UL (ref 1.8–8)
NEUTS SEG NFR BLD: 74 % (ref 40–73)
NITRITE UR QL STRIP.AUTO: NEGATIVE
PH UR STRIP: 5.5 [PH] (ref 5–8)
PLATELET # BLD AUTO: 287 K/UL (ref 135–420)
PMV BLD AUTO: 9.1 FL (ref 9.2–11.8)
POTASSIUM SERPL-SCNC: 3.3 MMOL/L (ref 3.5–5.5)
PROT SERPL-MCNC: 8.1 G/DL (ref 6.4–8.2)
PROT UR STRIP-MCNC: 30 MG/DL
RBC # BLD AUTO: 4.3 M/UL (ref 4.2–5.3)
RBC #/AREA URNS HPF: ABNORMAL /HPF (ref 0–5)
SODIUM SERPL-SCNC: 139 MMOL/L (ref 136–145)
SP GR UR REFRACTOMETRY: 1.02 (ref 1–1.03)
UROBILINOGEN UR QL STRIP.AUTO: 1 EU/DL (ref 0.2–1)
WBC # BLD AUTO: 6.1 K/UL (ref 4.6–13.2)
WBC URNS QL MICRO: ABNORMAL /HPF (ref 0–4)

## 2019-02-02 PROCEDURE — 96361 HYDRATE IV INFUSION ADD-ON: CPT

## 2019-02-02 PROCEDURE — 99283 EMERGENCY DEPT VISIT LOW MDM: CPT

## 2019-02-02 PROCEDURE — 96375 TX/PRO/DX INJ NEW DRUG ADDON: CPT

## 2019-02-02 PROCEDURE — 83690 ASSAY OF LIPASE: CPT

## 2019-02-02 PROCEDURE — 81025 URINE PREGNANCY TEST: CPT

## 2019-02-02 PROCEDURE — 80053 COMPREHEN METABOLIC PANEL: CPT

## 2019-02-02 PROCEDURE — C9113 INJ PANTOPRAZOLE SODIUM, VIA: HCPCS | Performed by: EMERGENCY MEDICINE

## 2019-02-02 PROCEDURE — 80307 DRUG TEST PRSMV CHEM ANLYZR: CPT

## 2019-02-02 PROCEDURE — 74011250636 HC RX REV CODE- 250/636: Performed by: EMERGENCY MEDICINE

## 2019-02-02 PROCEDURE — 96374 THER/PROPH/DIAG INJ IV PUSH: CPT

## 2019-02-02 PROCEDURE — 81001 URINALYSIS AUTO W/SCOPE: CPT

## 2019-02-02 PROCEDURE — 85025 COMPLETE CBC W/AUTO DIFF WBC: CPT

## 2019-02-02 PROCEDURE — 82962 GLUCOSE BLOOD TEST: CPT

## 2019-02-02 PROCEDURE — 74011250637 HC RX REV CODE- 250/637: Performed by: EMERGENCY MEDICINE

## 2019-02-02 RX ORDER — MORPHINE SULFATE 10 MG/ML
8 INJECTION, SOLUTION INTRAMUSCULAR; INTRAVENOUS ONCE
Status: COMPLETED | OUTPATIENT
Start: 2019-02-02 | End: 2019-02-02

## 2019-02-02 RX ORDER — QUETIAPINE FUMARATE 25 MG/1
100 TABLET, FILM COATED ORAL
COMMUNITY

## 2019-02-02 RX ORDER — FAMOTIDINE 20 MG/1
20 TABLET, FILM COATED ORAL 2 TIMES DAILY
Qty: 10 TAB | Refills: 0 | Status: SHIPPED | OUTPATIENT
Start: 2019-02-02 | End: 2019-02-05

## 2019-02-02 RX ORDER — CHLORPROMAZINE HYDROCHLORIDE 100 MG/1
100 TABLET, FILM COATED ORAL 3 TIMES DAILY
COMMUNITY
End: 2019-09-10

## 2019-02-02 RX ORDER — MIRTAZAPINE 15 MG/1
15 TABLET, FILM COATED ORAL
COMMUNITY
End: 2019-09-06

## 2019-02-02 RX ORDER — GABAPENTIN 400 MG/1
400 CAPSULE ORAL 3 TIMES DAILY
COMMUNITY
End: 2019-09-06

## 2019-02-02 RX ORDER — TRAZODONE HYDROCHLORIDE 100 MG/1
100 TABLET ORAL
COMMUNITY

## 2019-02-02 RX ORDER — HYDROXYZINE 50 MG/1
50 TABLET, FILM COATED ORAL 4 TIMES DAILY
COMMUNITY
End: 2019-12-18

## 2019-02-02 RX ORDER — PROMETHAZINE HYDROCHLORIDE 25 MG/1
25 TABLET ORAL
Qty: 12 TAB | Refills: 0 | Status: SHIPPED | OUTPATIENT
Start: 2019-02-02 | End: 2019-02-05

## 2019-02-02 RX ORDER — CITALOPRAM 40 MG/1
40 TABLET, FILM COATED ORAL DAILY
COMMUNITY
End: 2019-09-06

## 2019-02-02 RX ORDER — PANTOPRAZOLE SODIUM 40 MG/10ML
40 INJECTION, POWDER, LYOPHILIZED, FOR SOLUTION INTRAVENOUS
Status: COMPLETED | OUTPATIENT
Start: 2019-02-02 | End: 2019-02-02

## 2019-02-02 RX ORDER — PROPRANOLOL HYDROCHLORIDE 60 MG/1
60 CAPSULE, EXTENDED RELEASE ORAL DAILY
COMMUNITY
End: 2019-02-09 | Stop reason: DRUGHIGH

## 2019-02-02 RX ORDER — PROCHLORPERAZINE EDISYLATE 5 MG/ML
10 INJECTION INTRAMUSCULAR; INTRAVENOUS
Status: COMPLETED | OUTPATIENT
Start: 2019-02-02 | End: 2019-02-02

## 2019-02-02 RX ORDER — LORAZEPAM 1 MG/1
1 TABLET ORAL
Status: COMPLETED | OUTPATIENT
Start: 2019-02-02 | End: 2019-02-02

## 2019-02-02 RX ADMIN — LORAZEPAM 1 MG: 1 TABLET ORAL at 11:56

## 2019-02-02 RX ADMIN — PROCHLORPERAZINE EDISYLATE 10 MG: 5 INJECTION INTRAMUSCULAR; INTRAVENOUS at 11:47

## 2019-02-02 RX ADMIN — PANTOPRAZOLE SODIUM 40 MG: 40 INJECTION, POWDER, FOR SOLUTION INTRAVENOUS at 11:46

## 2019-02-02 RX ADMIN — SODIUM CHLORIDE 1000 ML: 900 INJECTION, SOLUTION INTRAVENOUS at 11:47

## 2019-02-02 RX ADMIN — MORPHINE SULFATE 8 MG: 10 INJECTION INTRAMUSCULAR; INTRAVENOUS; SUBCUTANEOUS at 11:47

## 2019-02-02 NOTE — ED NOTES
Repeat BS 86  MD aware. Patient has been in no distress since arrival.  States she is feeling better.

## 2019-02-02 NOTE — LETTER
NOTIFICATION RETURN TO WORK / SCHOOL 
 
2/2/2019 1:22 PM 
 
Ms. Alo Milian [id number] [facility] [id number] To Whom It May Concern: 
 
Geetha Martinez is currently under the care of [facility] EMERGENCY DEPT. She will return to work/school on: 2/4/2019 If there are questions or concerns please have the patient contact our office. Sincerely, Mike Kauffman MD

## 2019-02-02 NOTE — ED NOTES
I performed a brief evaluation, including history and physical, of the patient here in triage and I have determined that pt will need further treatment and evaluation from the main side ER physician. I have placed initial orders to help in expediting patients care. February 02, 2019 at 10:54 AM - Michael Crain Visit Vitals /88 (BP 1 Location: Right arm) Temp 98.2 °F (36.8 °C) Resp 16 Ht 5' 5\" (1.651 m) Wt 83 kg (183 lb) SpO2 100% BMI 30.45 kg/m²

## 2019-02-02 NOTE — ED PROVIDER NOTES
Emergency Department Treatment Report Patient: Tani Krueger Age: [address] y.o[address] Sex: female YOB: 1980 Admit Date: 2/2/2019 PCP: None MRN: [id number]  [facility]: [id number] Room: [place] [address] Time Dictated: 10:58 AM   
 
Chief Complaint Chief Complaint Patient presents with  Abdominal Pain History of Present Illness 11:14 AM Alo Collado is a [address] y.o[address] female with h/o alcoholic pancreatitis, endometriosis, cholecystectomy, liver surgery, alcohol withdrawl and other noted PMHx who presents to ED complaining of acute, moderate, epigastric abd pain onset last night, with associated nausea and loss of appetite after drinking a pint of vodka. The patient reports that she recently started using EtOH again. She states that has not eaten since yesterday. The patient notes that she also used EtOH this morning to help with \"the shakes. \" She denies diarrhea, bloody stool, and constipation. No other concerns or symptoms at this time. PCP: None Review of Systems Constitutional: Positive for appetite change. No fever, chills, or weight loss. Eyes: No visual symptoms. ENT: No sore throat, runny nose or ear pain. Respiratory: No cough, dyspnea or wheezing. Cardiovascular: No chest pain, pressure, palpitations, tightness or heaviness. Gastrointestinal: Positive for abdominal pain and nausea. No vomiting, diarrhea, bloody stool, or constipation. Genitourinary: No dysuria, frequency, or urgency. Musculoskeletal: No joint pain or swelling. Integumentary: No rashes. Neurological: No headaches, sensory or motor symptoms. Denies complaints in all other systems. Past Medical/Surgical History Past Medical History:  
Diagnosis Date  Endometriosis  Ill-defined condition   
 meningitis  Liver mass 2016  
 small liver mass found per pt during last hospital stay  Pancreatitis  Seizures ([address] due to alcohol withdrawal  
 
Past Surgical History: Procedure Laterality Date  COLPOSCOPY    
 HX CHOLECYSTECTOMY  HX LEEP PROCEDURE    
 LIVER SURGERY PROCEDURE UNLISTED  2004 \"Half of liver removed\" Social History Social History Socioeconomic History  Marital status:  Spouse name: Not on file  Number of children: Not on file  Years of education: Not on file  Highest education level: Not on file Tobacco Use  Smoking status: Current Every Day Smoker Packs/day: 0.50  Smokeless tobacco: Never Used Substance and Sexual Activity  Alcohol use: Yes Alcohol/week: 3.5 oz Types: 7 Cans of beer per week Comment: socially; wine daily since last week, drinks around 1L vodka a day, last drink 5/11/16  Drug use: Yes Types: Marijuana  Sexual activity: Yes  
  Partners: Male Birth control/protection: None Family History History reviewed. No pertinent family history. Home Medications None Allergies Allergies Allergen Reactions  Codeine Hives  Penicillins Hives Physical Exam  
 
ED Triage Vitals [02/02/19 1053] ED Encounter Vitals Group /88 Pulse Resp Rate 16 Temp 98.2 °F (36.8 °C) Temp src O2 Sat (%) 100 % Weight 183 lb Height 5' 5\" Constitutional: Patient appears well developed and well nourished. Appearance and behavior are age and situation appropriate. HEENT: Conjunctiva clear. PERRLA. Mucous membranes moist, non-erythematous. Surface of the pharynx, palate, and tongue are pink, moist and without lesions. Neck: supple, non tender, symmetrical, no masses or JVD. Respiratory: lungs clear to auscultation, nonlabored respirations. No tachypnea or accessory muscle use. Cardiovascular: heart regular rate and rhythm without murmur rubs or gallops. Calves soft and non-tender. Distal pulses 2+ and equal bilaterally. No peripheral edema. Gastrointestinal:  Abdomen soft. Epigastric tenderness. Musculoskeletal: Nail beds pink with prompt capillary refill Integumentary: warm and dry without rashes or lesions Neurologic: alert and oriented, Sensation intact, motor strength equal and symmetric. No facial asymmetry or dysarthria. Diagnostic Studies Lab:  
Recent Results (from the past 12 hour(s)) URINALYSIS W/ RFLX MICROSCOPIC Collection Time: 02/02/19 10:50 AM  
Result Value Ref Range Color DARK YELLOW Appearance CLEAR Specific gravity 1.021 1.005 - 1.030    
 pH (UA) 5.5 5.0 - 8.0 Protein 30 (A) NEG mg/dL Glucose NEGATIVE  NEG mg/dL Ketone 40 (A) NEG mg/dL Bilirubin NEGATIVE  NEG Blood LARGE (A) NEG Urobilinogen 1.0 0.2 - 1.0 EU/dL Nitrites NEGATIVE  NEG Leukocyte Esterase NEGATIVE  NEG    
HCG URINE, QL Collection Time: 02/02/19 10:50 AM  
Result Value Ref Range HCG urine, QL NEGATIVE  NEG    
URINE MICROSCOPIC ONLY Collection Time: 02/02/19 10:50 AM  
Result Value Ref Range WBC 0 to 2 0 - 4 /hpf  
 RBC 4 to 10 0 - 5 /hpf Epithelial cells FEW 0 - 5 /lpf Bacteria FEW (A) NEG /hpf  
CBC WITH AUTOMATED DIFF Collection Time: 02/02/19 11:46 AM  
Result Value Ref Range WBC 6.1 4.6 - 13.2 K/uL  
 RBC 4.30 4.20 - 5.30 M/uL  
 HGB 12.5 12.0 - 16.0 g/dL HCT 36.2 35.0 - 45.0 % MCV 84.2 74.0 - 97.0 FL  
 MCH 29.1 24.0 - 34.0 PG  
 MCHC 34.5 31.0 - 37.0 g/dL  
 RDW 16.1 (H) 11.6 - 14.5 % PLATELET 861 950 - 023 K/uL MPV 9.1 (L) 9.2 - 11.8 FL  
 NEUTROPHILS 74 (H) 40 - 73 % LYMPHOCYTES 20 (L) 21 - 52 % MONOCYTES 5 3 - 10 % EOSINOPHILS 1 0 - 5 % BASOPHILS 0 0 - 2 %  
 ABS. NEUTROPHILS 4.5 1.8 - 8.0 K/UL  
 ABS. LYMPHOCYTES 1.2 0.9 - 3.6 K/UL  
 ABS. MONOCYTES 0.3 0.05 - 1.2 K/UL  
 ABS. EOSINOPHILS 0.0 0.0 - 0.4 K/UL  
 ABS. BASOPHILS 0.0 0.0 - 0.1 K/UL  
 DF AUTOMATED METABOLIC PANEL, COMPREHENSIVE Collection Time: 02/02/19 11:46 AM  
Result Value Ref Range  Sodium 139 136 - 145 mmol/L  
 Potassium 3.3 (L) 3.5 - 5.5 mmol/L Chloride 103 100 - 108 mmol/L  
 CO2 24 21 - 32 mmol/L Anion gap 12 3.0 - 18 mmol/L Glucose 46 (LL) 74 - 99 mg/dL BUN 12 7.0 - 18 MG/DL Creatinine 0.73 0.6 - 1.3 MG/DL  
 BUN/Creatinine ratio 16 12 - 20 GFR est AA >60 >60 ml/min/1.73m2 GFR est non-AA >60 >60 ml/min/1.73m2 Calcium 8.3 (L) 8.5 - 10.1 MG/DL Bilirubin, total 0.4 0.2 - 1.0 MG/DL  
 ALT (SGPT) 22 13 - 56 U/L  
 AST (SGOT) 30 15 - 37 U/L Alk. phosphatase 143 (H) 45 - 117 U/L Protein, total 8.1 6.4 - 8.2 g/dL Albumin 3.6 3.4 - 5.0 g/dL Globulin 4.5 (H) 2.0 - 4.0 g/dL A-G Ratio 0.8 0.8 - 1.7 LIPASE Collection Time: 02/02/19 11:46 AM  
Result Value Ref Range Lipase 42 (L) 73 - 393 U/L  
ETHYL ALCOHOL Collection Time: 02/02/19 11:46 AM  
Result Value Ref Range ALCOHOL(ETHYL),SERUM 215 (H) 0 - 3 MG/DL  
GLUCOSE, POC Collection Time: 02/02/19  1:12 PM  
Result Value Ref Range Glucose (POC) 86 70 - 110 mg/dL Imaging: No results found. [facility] 
 
ED Course/Medical Decision Making Pt arrives in ED with epigastric pain after drinking a large amount of vodka last night, however, lipase is nml. Pt feels much better after systematic control. Pt was initially hypoglycemic, but improved after drinking juice. No signs of active withdrawal. Alcohol is 210, pt is able to ambulate with steady gait in the ED. No vomiting. Pt's  is able to take the pt home. Offered resources for detox. Also instructed pt to stay hydrated. Pt was given strict return precautions and understands. Medications  
sodium chloride 0.9 % bolus infusion 1,000 mL (0 mL IntraVENous IV Completed 2/2/19 6173) pantoprazole (PROTONIX) injection 40 mg (40 mg IntraVENous Given 2/2/19 1146) morphine 10 mg/ml injection 8 mg (8 mg IntraVENous Given 2/2/19 1147) prochlorperazine (COMPAZINE) injection 10 mg (10 mg IntraVENous Given 2/2/19 1147) LORazepam (ATIVAN) tablet 1 mg (1 mg Oral Given 2/2/19 1156) Final Diagnosis ICD-10-CM ICD-9-CM 1. Abdominal pain, epigastric R10.13 789.06  
2. Nausea and vomiting, intractability of vomiting not specified, unspecified vomiting type R11.2 787.01  
3. Hypoglycemia E16.2 251.2 Disposition Patient is discharged home in stable condition. Advised to follow with their primary care physician. Patient advised to return to the ER for any new or worsening symptoms. My Medications START taking these medications Instructions Each Dose to Equal Morning Noon Evening Bedtime  
famotidine 20 mg tablet Commonly known as:  PEPCID Your last dose was: Your next dose is: Take 1 Tab by mouth two (2) times a day for 5 days. 20 mg 
  
  
  
  
  
promethazine 25 mg tablet Commonly known as:  PHENERGAN Your last dose was: Your next dose is: Take 1 Tab by mouth every six (6) hours as needed. 25 mg CONTINUE taking these medications Instructions Each Dose to Equal Morning Noon Evening Bedtime CeleXA 40 mg tablet Generic drug:  citalopram 
 
Your last dose was: Your next dose is: Take 40 mg by mouth daily. 40 mg 
  
  
  
  
  
chlorproMAZINE 100 mg tablet Commonly known as:  THORAZINE Your last dose was: Your next dose is: Take 100 mg by mouth three (3) times daily. 100 mg 
  
  
  
  
  
gabapentin 400 mg capsule Commonly known as:  NEURONTIN Your last dose was: Your next dose is: Take 400 mg by mouth three (3) times daily. 400 mg 
  
  
  
  
  
hydrOXYzine HCl 50 mg tablet Commonly known as:  ATARAX Your last dose was: Your next dose is: Take 50 mg by mouth three (3) times daily as needed for Itching. 50 mg INDERAL LA 60 mg SR capsule Generic drug:  propranolol LA Your last dose was: Your next dose is: Take 60 mg by mouth daily. 60 mg 
  
  
  
  
  
REMERON 15 mg tablet Generic drug:  mirtazapine Your last dose was: Your next dose is: Take 15 mg by mouth nightly. 15 mg SEROquel 25 mg tablet Generic drug:  QUEtiapine Your last dose was: Your next dose is: Take 50 mg by mouth nightly as needed. 50 mg 
  
  
  
  
  
traZODone 100 mg tablet Commonly known as:  Negrita Deeds Your last dose was: Your next dose is: Take 100 mg by mouth nightly. 100 mg Where to Get Your Medications Information on where to get these meds will be given to you by the nurse or doctor. Ask your nurse or doctor about these medications · famotidine 20 mg tablet · promethazine 25 mg tablet Gabriela Eckert MD 
February 2, 2019 My signature above authenticates this document and my orders, the final   
diagnosis (es), discharge prescription (s), and instructions in the Epic   
record. If you have any questions please contact [phone]. 
  
Nursing notes have been reviewed by the physician/ advanced practice   
Clinician. Scribe Attestation Arielle Mcmanus acting as a scribe for and in the presence of Juan Sahu MD     
February 02, 2019 at 11:13 AM 
    
Provider Attestation:     
I personally performed the services described in the documentation, reviewed the documentation, as recorded by the scribe in my presence, and it accurately and completely records my words and actions.  February 02, 2019 at 11:13 AM - Juan Sahu MD

## 2019-06-03 ENCOUNTER — HOSPITAL ENCOUNTER (EMERGENCY)
Age: 39
Discharge: HOME | End: 2019-06-04
Attending: EMERGENCY MEDICINE
Payer: MEDICAID

## 2019-06-03 VITALS
HEART RATE: 63 BPM | BODY MASS INDEX: 30.82 KG/M2 | OXYGEN SATURATION: 100 % | TEMPERATURE: 98.6 F | SYSTOLIC BLOOD PRESSURE: 115 MMHG | WEIGHT: 185 LBS | DIASTOLIC BLOOD PRESSURE: 81 MMHG | RESPIRATION RATE: 10 BRPM | HEIGHT: 65 IN

## 2019-06-03 DIAGNOSIS — R10.13 ABDOMINAL PAIN, EPIGASTRIC: Primary | ICD-10-CM

## 2019-06-03 DIAGNOSIS — F10.10 ALCOHOL ABUSE: ICD-10-CM

## 2019-06-03 LAB
ALBUMIN SERPL-MCNC: 4 G/DL (ref 3.4–5)
ALBUMIN/GLOB SERPL: 0.9 {RATIO} (ref 0.8–1.7)
ALP SERPL-CCNC: 114 U/L (ref 45–117)
ALT SERPL-CCNC: 74 U/L (ref 13–56)
ANION GAP SERPL CALC-SCNC: 7 MMOL/L (ref 3–18)
AST SERPL-CCNC: 125 U/L (ref 15–37)
BILIRUB SERPL-MCNC: 0.6 MG/DL (ref 0.2–1)
BUN SERPL-MCNC: 7 MG/DL (ref 7–18)
BUN/CREAT SERPL: 11 (ref 12–20)
CALCIUM SERPL-MCNC: 8.9 MG/DL (ref 8.5–10.1)
CHLORIDE SERPL-SCNC: 99 MMOL/L (ref 100–108)
CO2 SERPL-SCNC: 32 MMOL/L (ref 21–32)
CREAT SERPL-MCNC: 0.63 MG/DL (ref 0.6–1.3)
ETHANOL SERPL-MCNC: <3 MG/DL (ref 0–3)
GLOBULIN SER CALC-MCNC: 4.4 G/DL (ref 2–4)
GLUCOSE SERPL-MCNC: 84 MG/DL (ref 74–99)
HCG SERPL QL: NEGATIVE
LIPASE SERPL-CCNC: 56 U/L (ref 73–393)
POTASSIUM SERPL-SCNC: 3.3 MMOL/L (ref 3.5–5.5)
PROT SERPL-MCNC: 8.4 G/DL (ref 6.4–8.2)
SODIUM SERPL-SCNC: 138 MMOL/L (ref 136–145)

## 2019-06-03 PROCEDURE — 80307 DRUG TEST PRSMV CHEM ANLYZR: CPT

## 2019-06-03 PROCEDURE — 80053 COMPREHEN METABOLIC PANEL: CPT

## 2019-06-03 PROCEDURE — 85025 COMPLETE CBC W/AUTO DIFF WBC: CPT

## 2019-06-03 PROCEDURE — 83690 ASSAY OF LIPASE: CPT

## 2019-06-03 PROCEDURE — 74011250637 HC RX REV CODE- 250/637: Performed by: EMERGENCY MEDICINE

## 2019-06-03 PROCEDURE — 84703 CHORIONIC GONADOTROPIN ASSAY: CPT

## 2019-06-03 PROCEDURE — 99284 EMERGENCY DEPT VISIT MOD MDM: CPT

## 2019-06-03 PROCEDURE — 74011250636 HC RX REV CODE- 250/636: Performed by: EMERGENCY MEDICINE

## 2019-06-03 PROCEDURE — 74011000250 HC RX REV CODE- 250: Performed by: EMERGENCY MEDICINE

## 2019-06-03 RX ORDER — LIDOCAINE HYDROCHLORIDE 20 MG/ML
15 SOLUTION OROPHARYNGEAL
Status: DISCONTINUED | OUTPATIENT
Start: 2019-06-03 | End: 2019-06-04 | Stop reason: HOSPADM

## 2019-06-03 RX ORDER — PROMETHAZINE HYDROCHLORIDE 25 MG/1
12.5 TABLET ORAL
Status: DISCONTINUED | OUTPATIENT
Start: 2019-06-03 | End: 2019-06-04 | Stop reason: HOSPADM

## 2019-06-03 RX ORDER — ONDANSETRON 2 MG/ML
4 INJECTION INTRAMUSCULAR; INTRAVENOUS
Status: DISCONTINUED | OUTPATIENT
Start: 2019-06-03 | End: 2019-06-03

## 2019-06-03 RX ORDER — FAMOTIDINE 10 MG/ML
20 INJECTION INTRAVENOUS
Status: DISCONTINUED | OUTPATIENT
Start: 2019-06-03 | End: 2019-06-04 | Stop reason: HOSPADM

## 2019-06-03 NOTE — ED TRIAGE NOTES
\"I have pancreatitis and I'm a heavy drinker. My last drink was at about 9:00 or 10:00 last night. My stomach hurts real bad. \"

## 2019-06-04 LAB
BASOPHILS # BLD: 0 K/UL (ref 0–0.1)
BASOPHILS NFR BLD: 0 % (ref 0–2)
DIFFERENTIAL METHOD BLD: ABNORMAL
EOSINOPHIL # BLD: 0.1 K/UL (ref 0–0.4)
EOSINOPHIL NFR BLD: 2 % (ref 0–5)
ERYTHROCYTE [DISTWIDTH] IN BLOOD BY AUTOMATED COUNT: 16.6 % (ref 11.6–14.5)
HCT VFR BLD AUTO: 35.7 % (ref 35–45)
HGB BLD-MCNC: 12 G/DL (ref 12–16)
LYMPHOCYTES # BLD: 0.9 K/UL (ref 0.9–3.6)
LYMPHOCYTES NFR BLD: 25 % (ref 21–52)
MCH RBC QN AUTO: 30.2 PG (ref 24–34)
MCHC RBC AUTO-ENTMCNC: 33.6 G/DL (ref 31–37)
MCV RBC AUTO: 89.9 FL (ref 74–97)
MONOCYTES # BLD: 0.5 K/UL (ref 0.05–1.2)
MONOCYTES NFR BLD: 14 % (ref 3–10)
NEUTS SEG # BLD: 2.2 K/UL (ref 1.8–8)
NEUTS SEG NFR BLD: 59 % (ref 40–73)
PLATELET # BLD AUTO: 267 K/UL (ref 135–420)
PMV BLD AUTO: 10.3 FL (ref 9.2–11.8)
RBC # BLD AUTO: 3.97 M/UL (ref 4.2–5.3)
WBC # BLD AUTO: 3.7 K/UL (ref 4.6–13.2)

## 2019-06-04 PROCEDURE — 96361 HYDRATE IV INFUSION ADD-ON: CPT

## 2019-06-04 PROCEDURE — 96374 THER/PROPH/DIAG INJ IV PUSH: CPT

## 2019-06-04 PROCEDURE — 96375 TX/PRO/DX INJ NEW DRUG ADDON: CPT

## 2019-06-04 PROCEDURE — 74011000250 HC RX REV CODE- 250: Performed by: EMERGENCY MEDICINE

## 2019-06-04 PROCEDURE — 74011250636 HC RX REV CODE- 250/636: Performed by: EMERGENCY MEDICINE

## 2019-06-04 PROCEDURE — 74011250637 HC RX REV CODE- 250/637: Performed by: EMERGENCY MEDICINE

## 2019-06-04 RX ORDER — DICYCLOMINE HYDROCHLORIDE 10 MG/ML
20 INJECTION INTRAMUSCULAR
Status: COMPLETED | OUTPATIENT
Start: 2019-06-04 | End: 2019-06-04

## 2019-06-04 RX ORDER — PROMETHAZINE HYDROCHLORIDE 25 MG/1
12.5 TABLET ORAL
Status: COMPLETED | OUTPATIENT
Start: 2019-06-04 | End: 2019-06-04

## 2019-06-04 RX ORDER — PROMETHAZINE HYDROCHLORIDE 25 MG/1
TABLET ORAL
Status: DISCONTINUED
Start: 2019-06-04 | End: 2019-06-04 | Stop reason: HOSPADM

## 2019-06-04 RX ORDER — CHLORDIAZEPOXIDE HYDROCHLORIDE 25 MG/1
50 CAPSULE, GELATIN COATED ORAL
Status: COMPLETED | OUTPATIENT
Start: 2019-06-04 | End: 2019-06-04

## 2019-06-04 RX ORDER — METOCLOPRAMIDE HYDROCHLORIDE 5 MG/ML
10 INJECTION INTRAMUSCULAR; INTRAVENOUS
Status: COMPLETED | OUTPATIENT
Start: 2019-06-04 | End: 2019-06-04

## 2019-06-04 RX ORDER — FAMOTIDINE 10 MG/ML
20 INJECTION INTRAVENOUS
Status: COMPLETED | OUTPATIENT
Start: 2019-06-04 | End: 2019-06-04

## 2019-06-04 RX ORDER — PROMETHAZINE HYDROCHLORIDE 25 MG/1
25 TABLET ORAL
Qty: 12 TAB | Refills: 0 | Status: SHIPPED | OUTPATIENT
Start: 2019-06-04 | End: 2019-08-10

## 2019-06-04 RX ADMIN — FAMOTIDINE 20 MG: 10 INJECTION INTRAVENOUS at 01:02

## 2019-06-04 RX ADMIN — SODIUM CHLORIDE 1000 ML: 900 INJECTION, SOLUTION INTRAVENOUS at 05:08

## 2019-06-04 RX ADMIN — METOCLOPRAMIDE 10 MG: 5 INJECTION, SOLUTION INTRAMUSCULAR; INTRAVENOUS at 05:09

## 2019-06-04 RX ADMIN — DICYCLOMINE HYDROCHLORIDE 20 MG: 20 INJECTION, SOLUTION INTRAMUSCULAR at 05:08

## 2019-06-04 RX ADMIN — LIDOCAINE HYDROCHLORIDE 15 ML: 20 SOLUTION ORAL; TOPICAL at 01:08

## 2019-06-04 RX ADMIN — CHLORDIAZEPOXIDE HYDROCHLORIDE 50 MG: 25 CAPSULE ORAL at 00:52

## 2019-06-04 RX ADMIN — PROMETHAZINE HYDROCHLORIDE 12.5 MG: 25 TABLET ORAL at 01:02

## 2019-06-04 RX ADMIN — ALUMINUM HYDROXIDE AND MAGNESIUM HYDROXIDE 15 ML: 200; 200 SUSPENSION ORAL at 01:02

## 2019-06-04 NOTE — DISCHARGE INSTRUCTIONS

## 2019-06-04 NOTE — BSMART NOTE
Comprehensive Assessment Form Part 1    Section I - Disposition    The plan is for the patient to follow up with outpatient services. The Medical Doctor is in agreement with disposition. Section II - Integrated Summary  The information is given by the patient. The Chief Complaint is ETOH detox. The Precipitant Factors are life stressors. Previous Hospitalizations: Detox only    Patient is a 34 y.o. female with history of alcohol abuse and alcoholic pancreatitis who presents requesting ETOH detox. Patient reports she attempted to detox at home but understands the dangers so she came to the hospital. Patient reports prior detox 10/2018 at [facility] and 11/2018 at [facility]. Patient denies any prior detox on a medical unit. Patient reports she is supposed to take psychotropic medications but has not returned to [address] [place] [address] to obtain medications. Patient denies SI, HI, aud/vis hallucinations. Patient denies any feelings of depression or sadness. Patient reports she has normal stresses of life. The patient's appearance shows no evidence of impairment. The patient's behavior is guarded. The patient is oriented to time, place, person and situation. The patient's speech shows no evidence of impairment. The patient's mood  is irritable. The range of affect is constricted. The patient's thought content  demonstrates no evidence of impairment. The thought process shows no evidence of impairment. The patient's appetite is decreased and shows signs of weight loss. The patient's sleep has evidence of insomnia. The patient's insight shows no evidence of impairment. Discussed with patient CSU. Patient advised she did not like the facility and was not going back there. Patient does not meet inpatient criteria.            Karlie Brian Poisson

## 2019-06-04 NOTE — ED PROVIDER NOTES
EMERGENCY DEPARTMENT HISTORY AND PHYSICAL EXAM    11:38 PM      Date: 6/3/2019  Patient Name: Bryan Vasquez    History of Presenting Illness     Chief Complaint   Patient presents with    Abdominal Pain    Alcohol Problem         History Provided By: Patient    Additional History (Context): Bryan Vasquez is a 34 y.o. female with history of alcohol abuse and alcoholic pancreatitis who presents with complaint of 2 to 3 days of work ascending, burning, severe epigastric abdominal pain, without radiation. She states that she has been drinking heavily over the past 2 weeks, and try to stop drinking alcohol, which started to cause some withdrawal symptoms, so she started drinking again, and her pain increased again. She denies any fever, diarrhea, constipation, dysuria or hematuria, or other associated symptoms. PCP: None    Current Facility-Administered Medications   Medication Dose Route Frequency Provider Last Rate Last Dose    promethazine (PHENERGAN) 25 mg tablet             aluminum-magnesium hydroxide (MAALOX) 200-200 mg/5 mL oral suspension             sodium chloride 0.9 % bolus infusion 1,000 mL  1,000 mL IntraVENous ONCE McBeth, Marisela Apgar, MD 1,000 mL/hr at 19 0508 1,000 mL at 19 0508    aluminum-magnesium hydroxide (MAALOX) oral suspension 15 mL  15 mL Oral NOW McBeth, Marisela Apgar, MD        lidocaine (XYLOCAINE) 2 % viscous solution 15 mL  15 mL Mouth/Throat Q3H PRN Antonio Wang MD   15 mL at 19 0108    famotidine (PF) (PEPCID) injection 20 mg  20 mg IntraVENous NOW McBeth, Marisela Apgar, MD        promethazine (PHENERGAN) tablet 12.5 mg  12.5 mg Oral NOW McBeth, Marisela Apgar, MD         Current Outpatient Medications   Medication Sig Dispense Refill    promethazine (PHENERGAN) 25 mg tablet Take 1 Tab by mouth every six (6) hours as needed for Nausea. 12 Tab 0    propranolol (INDERAL) 60 mg tablet Take 60 mg by mouth two (2) times a day.       chlorproMAZINE (THORAZINE) 100 mg tablet Take 100 mg by mouth three (3) times daily.  QUEtiapine (SEROQUEL) 25 mg tablet Take 100 mg by mouth nightly as needed.  gabapentin (NEURONTIN) 400 mg capsule Take 400 mg by mouth three (3) times daily.  traZODone (DESYREL) 100 mg tablet Take 200 mg by mouth nightly.  citalopram (CELEXA) 40 mg tablet Take 40 mg by mouth daily.  hydrOXYzine HCl (ATARAX) 50 mg tablet Take 50 mg by mouth four (4) times daily.  mirtazapine (REMERON) 15 mg tablet Take 15 mg by mouth nightly. Past History     Past Medical History:  Past Medical History:   Diagnosis Date    Endometriosis     Ill-defined condition     meningitis    Liver mass 2016    small liver mass found per pt during last hospital stay    Pancreatitis     Seizures ([facility] [address]     due to alcohol withdrawal       Past Surgical History:  Past Surgical History:   Procedure Laterality Date    COLPOSCOPY      HX CHOLECYSTECTOMY      HX LEEP PROCEDURE      LIVER SURGERY PROCEDURE UNLISTED  2004    \"Half of liver removed\"        Family History:  History reviewed. No pertinent family history. Social History:  Social History     Tobacco Use    Smoking status: Current Every Day Smoker     Packs/day: 1.00    Smokeless tobacco: Never Used   Substance Use Topics    Alcohol use: Yes    Drug use: Yes     Types: Marijuana       Allergies: Allergies   Allergen Reactions    Seafood Anaphylaxis     Pt is ok with iodine contrast    Codeine Hives    Penicillins Hives         Review of Systems       Review of Systems   Constitutional: Negative for activity change and appetite change. HENT: Negative for congestion. Eyes: Negative for visual disturbance. Respiratory: Negative for cough and shortness of breath. Cardiovascular: Negative for chest pain. Gastrointestinal: Positive for abdominal pain, nausea and vomiting. Negative for diarrhea. Genitourinary: Negative for dysuria.    Musculoskeletal: Negative for arthralgias and myalgias. Skin: Negative for rash. Neurological: Negative for weakness and numbness. Physical Exam     Visit Vitals  /81   Pulse 63   Temp 98.6 °F (37 °C)   Resp 10   Ht 5' 5\" (1.651 m)   Wt 83.9 kg (185 lb)   SpO2 100%   BMI 30.79 kg/m²         Physical Exam   Constitutional: She is oriented to person, place, and time. She appears well-developed and well-nourished. HENT:   Head: Normocephalic and atraumatic. Mouth/Throat: Oropharynx is clear and moist.   Eyes: Conjunctivae are normal.   Neck: Normal range of motion. Neck supple. No JVD present. Cardiovascular: Normal rate, regular rhythm, normal heart sounds and intact distal pulses. No murmur heard. Pulmonary/Chest: Effort normal and breath sounds normal.   Abdominal: Soft. Bowel sounds are normal. She exhibits no distension. There is tenderness. Mild epigastric tenderness   Musculoskeletal: Normal range of motion. She exhibits no deformity. Lymphadenopathy:     She has no cervical adenopathy. Neurological: She is alert and oriented to person, place, and time. Coordination normal.   Skin: Skin is warm and dry. No rash noted. Psychiatric: She has a normal mood and affect. Nursing note and vitals reviewed. Diagnostic Study Results     Labs -  Recent Results (from the past 12 hour(s))   CBC WITH AUTOMATED DIFF    Collection Time: 06/03/19 11:16 PM   Result Value Ref Range    WBC 3.7 (L) 4.6 - 13.2 K/uL    RBC 3.97 (L) 4.20 - 5.30 M/uL    HGB 12.0 12.0 - 16.0 g/dL    HCT 35.7 35.0 - 45.0 %    MCV 89.9 74.0 - 97.0 FL    MCH 30.2 24.0 - 34.0 PG    MCHC 33.6 31.0 - 37.0 g/dL    RDW 16.6 (H) 11.6 - 14.5 %    PLATELET 421 065 - 625 K/uL    MPV 10.3 9.2 - 11.8 FL    NEUTROPHILS 59 40 - 73 %    LYMPHOCYTES 25 21 - 52 %    MONOCYTES 14 (H) 3 - 10 %    EOSINOPHILS 2 0 - 5 %    BASOPHILS 0 0 - 2 %    ABS. NEUTROPHILS 2.2 1.8 - 8.0 K/UL    ABS. LYMPHOCYTES 0.9 0.9 - 3.6 K/UL    ABS. MONOCYTES 0.5 0.05 - 1.2 K/UL    ABS.  EOSINOPHILS 0.1 0.0 - 0.4 K/UL    ABS. BASOPHILS 0.0 0.0 - 0.1 K/UL    DF AUTOMATED     METABOLIC PANEL, COMPREHENSIVE    Collection Time: 06/03/19 11:16 PM   Result Value Ref Range    Sodium 138 136 - 145 mmol/L    Potassium 3.3 (L) 3.5 - 5.5 mmol/L    Chloride 99 (L) 100 - 108 mmol/L    CO2 32 21 - 32 mmol/L    Anion gap 7 3.0 - 18 mmol/L    Glucose 84 74 - 99 mg/dL    BUN 7 7.0 - 18 MG/DL    Creatinine 0.63 0.6 - 1.3 MG/DL    BUN/Creatinine ratio 11 (L) 12 - 20      GFR est AA >60 >60 ml/min/1.73m2    GFR est non-AA >60 >60 ml/min/1.73m2    Calcium 8.9 8.5 - 10.1 MG/DL    Bilirubin, total 0.6 0.2 - 1.0 MG/DL    ALT (SGPT) 74 (H) 13 - 56 U/L    AST (SGOT) 125 (H) 15 - 37 U/L    Alk. phosphatase 114 45 - 117 U/L    Protein, total 8.4 (H) 6.4 - 8.2 g/dL    Albumin 4.0 3.4 - 5.0 g/dL    Globulin 4.4 (H) 2.0 - 4.0 g/dL    A-G Ratio 0.9 0.8 - 1.7     LIPASE    Collection Time: 06/03/19 11:16 PM   Result Value Ref Range    Lipase 56 (L) 73 - 393 U/L   ETHYL ALCOHOL    Collection Time: 06/03/19 11:16 PM   Result Value Ref Range    ALCOHOL(ETHYL),SERUM <3 0 - 3 MG/DL   HCG QL SERUM    Collection Time: 06/03/19 11:16 PM   Result Value Ref Range    HCG, Ql. NEGATIVE  NEG         Radiologic Studies -   No orders to display         Medical Decision Making   I am the first provider for this patient. I reviewed the vital signs, available nursing notes, past medical history, past surgical history, family history and social history. Vital Signs-Reviewed the patient's vital signs. Records Reviewed: Nursing Notes (Time of Review: 11:38 PM)      Provider Notes (Medical Decision Making):    Ajay Edwards is a 34 y.o. female with history of alcohol abuse and alcoholic pancreatitis who presents with complaint of 2 to 3 days of work ascending, burning, severe epigastric abdominal pain, without radiation. Patient with some mild epigastric tenderness, vitals unremarkable.     Differential Diagnosis: Given the patient's history, will evaluate for possible alcoholic pancreatitis or gastritis, though other etiologies should be considered including infection, hepatobiliary disease, but low suspicion for ACS, bowel obstruction. Patient not in acute alcohol withdrawal at this time. Testing: CBC, CMP, lipase, urinalysis, hCG  Treatments: GI cocktail, Phenergan, Librium    Re-evaluations:  Patient with no evidence of acute pancreatitis. Likely alcoholic gastritis or chronic pancreatitis. Patient seen by [facility], but as she does not have insurance, qualifies only for [facility] for detox. The patient is not interested in going to pathways and refuses any further assistance. At this time she is stable and appropriate for discharge without any evidence of acute etiology. Diagnosis     Clinical Impression:   1. Abdominal pain, epigastric    2. Alcohol abuse        Disposition: discharge    Follow-up Information     Follow up With Specialties Details Why [address] [facility] EM[facility]ENC[facility] Emergency Medicine  If symptoms worsen [address]  [phone]           Patient's Medications   Start Taking    PROMETHAZINE (PHENERGAN) 25 MG TABLET    Take 1 Tab by mouth every six (6) hours as needed for Nausea. Continue Taking    CHLORPROMAZINE (THORAZINE) 100 MG TABLET    Take 100 mg by mouth three (3) times daily. CITALOPRAM (CELEXA) 40 MG TABLET    Take 40 mg by mouth daily. GABAPENTIN (NEURONTIN) 400 MG CAPSULE    Take 400 mg by mouth three (3) times daily. HYDROXYZINE HCL (ATARAX) 50 MG TABLET    Take 50 mg by mouth four (4) times daily. MIRTAZAPINE (REMERON) 15 MG TABLET    Take 15 mg by mouth nightly. PROPRANOLOL (INDERAL) 60 MG TABLET    Take 60 mg by mouth two (2) times a day. QUETIAPINE (SEROQUEL) 25 MG TABLET    Take 100 mg by mouth nightly as needed. TRAZODONE (DESYREL) 100 MG TABLET    Take 200 mg by mouth nightly.    These Medications have changed    No medications on file   Stop Taking    No medications on file     _______________________________    Attestations:  Maddison Mendoza MD acting as a scribe for and in the presence of Mauro Smith MD      June 04, 2019 at 5:43 AM       Provider Attestation:      I personally performed the services described in the documentation, reviewed the documentation, as recorded by the scribe in my presence, and it accurately and completely records my words and actions.  June 04, 2019 at 5:43 AM - Mauro Smith MD    _______________________________

## 2019-06-04 NOTE — ED NOTES
Patient stated that the doctor said she could have something for withdrawal. Patient is refusing all medication until she can get something for withdrawal symptoms. Patient is currently stable and showing no signs or symptoms of withdrawal at this time.  Vital signs are within normal limits at this time

## 2019-08-09 ENCOUNTER — HOSPITAL ENCOUNTER (EMERGENCY)
Age: 39
Discharge: HOME | End: 2019-08-10
Attending: EMERGENCY MEDICINE
Payer: MEDICAID

## 2019-08-09 DIAGNOSIS — N28.89 RIGHT KIDNEY MASS: ICD-10-CM

## 2019-08-09 DIAGNOSIS — R10.84 ABDOMINAL PAIN, GENERALIZED: Primary | ICD-10-CM

## 2019-08-09 DIAGNOSIS — F10.239 ALCOHOL DEPENDENCE WITH WITHDRAWAL WITH COMPLICATION (HCC): ICD-10-CM

## 2019-08-09 LAB
ALBUMIN SERPL-MCNC: 3.5 G/DL (ref 3.4–5)
ALBUMIN/GLOB SERPL: 0.8 {RATIO} (ref 0.8–1.7)
ALP SERPL-CCNC: 134 U/L (ref 45–117)
ALT SERPL-CCNC: 30 U/L (ref 13–56)
ANION GAP SERPL CALC-SCNC: 8 MMOL/L (ref 3–18)
APPEARANCE UR: CLEAR
AST SERPL-CCNC: 56 U/L (ref 10–38)
BACTERIA URNS QL MICRO: ABNORMAL /HPF
BASOPHILS # BLD: 0 K/UL (ref 0–0.1)
BASOPHILS NFR BLD: 0 % (ref 0–2)
BILIRUB SERPL-MCNC: 0.6 MG/DL (ref 0.2–1)
BILIRUB UR QL: ABNORMAL
BUN SERPL-MCNC: 8 MG/DL (ref 7–18)
BUN/CREAT SERPL: 14 (ref 12–20)
CALCIUM SERPL-MCNC: 9 MG/DL (ref 8.5–10.1)
CHLORIDE SERPL-SCNC: 101 MMOL/L (ref 100–111)
CO2 SERPL-SCNC: 28 MMOL/L (ref 21–32)
COLOR UR: ABNORMAL
CREAT SERPL-MCNC: 0.56 MG/DL (ref 0.6–1.3)
DIFFERENTIAL METHOD BLD: ABNORMAL
EOSINOPHIL # BLD: 0.1 K/UL (ref 0–0.4)
EOSINOPHIL NFR BLD: 2 % (ref 0–5)
EPITH CASTS URNS QL MICRO: ABNORMAL /LPF (ref 0–5)
ERYTHROCYTE [DISTWIDTH] IN BLOOD BY AUTOMATED COUNT: 16.1 % (ref 11.6–14.5)
GLOBULIN SER CALC-MCNC: 4.6 G/DL (ref 2–4)
GLUCOSE SERPL-MCNC: 67 MG/DL (ref 74–99)
GLUCOSE UR STRIP.AUTO-MCNC: NEGATIVE MG/DL
HCG UR QL: NEGATIVE
HCT VFR BLD AUTO: 38.1 % (ref 35–45)
HGB BLD-MCNC: 13.2 G/DL (ref 12–16)
HGB UR QL STRIP: NEGATIVE
KETONES UR QL STRIP.AUTO: 15 MG/DL
LEUKOCYTE ESTERASE UR QL STRIP.AUTO: NEGATIVE
LIPASE SERPL-CCNC: 54 U/L (ref 73–393)
LYMPHOCYTES # BLD: 1.5 K/UL (ref 0.9–3.6)
LYMPHOCYTES NFR BLD: 25 % (ref 21–52)
MCH RBC QN AUTO: 31 PG (ref 24–34)
MCHC RBC AUTO-ENTMCNC: 34.6 G/DL (ref 31–37)
MCV RBC AUTO: 89.4 FL (ref 74–97)
MONOCYTES # BLD: 0.5 K/UL (ref 0.05–1.2)
MONOCYTES NFR BLD: 8 % (ref 3–10)
MUCOUS THREADS URNS QL MICRO: ABNORMAL /LPF
NEUTS SEG # BLD: 3.8 K/UL (ref 1.8–8)
NEUTS SEG NFR BLD: 65 % (ref 40–73)
NITRITE UR QL STRIP.AUTO: NEGATIVE
PH UR STRIP: 5.5 [PH] (ref 5–8)
PLATELET # BLD AUTO: 228 K/UL (ref 135–420)
PMV BLD AUTO: 9.9 FL (ref 9.2–11.8)
POTASSIUM SERPL-SCNC: 3.8 MMOL/L (ref 3.5–5.5)
PROT SERPL-MCNC: 8.1 G/DL (ref 6.4–8.2)
PROT UR STRIP-MCNC: ABNORMAL MG/DL
RBC # BLD AUTO: 4.26 M/UL (ref 4.2–5.3)
RBC #/AREA URNS HPF: ABNORMAL /HPF (ref 0–5)
SODIUM SERPL-SCNC: 137 MMOL/L (ref 136–145)
SP GR UR REFRACTOMETRY: 1.02 (ref 1–1.03)
UROBILINOGEN UR QL STRIP.AUTO: 1 EU/DL (ref 0.2–1)
WBC # BLD AUTO: 5.9 K/UL (ref 4.6–13.2)
WBC URNS QL MICRO: ABNORMAL /HPF (ref 0–4)

## 2019-08-09 PROCEDURE — 81025 URINE PREGNANCY TEST: CPT

## 2019-08-09 PROCEDURE — 99285 EMERGENCY DEPT VISIT HI MDM: CPT

## 2019-08-09 PROCEDURE — 96375 TX/PRO/DX INJ NEW DRUG ADDON: CPT

## 2019-08-09 PROCEDURE — 81001 URINALYSIS AUTO W/SCOPE: CPT

## 2019-08-09 PROCEDURE — 83690 ASSAY OF LIPASE: CPT

## 2019-08-09 PROCEDURE — 96374 THER/PROPH/DIAG INJ IV PUSH: CPT

## 2019-08-09 PROCEDURE — 80053 COMPREHEN METABOLIC PANEL: CPT

## 2019-08-09 PROCEDURE — 85025 COMPLETE CBC W/AUTO DIFF WBC: CPT

## 2019-08-09 PROCEDURE — 74011250636 HC RX REV CODE- 250/636: Performed by: EMERGENCY MEDICINE

## 2019-08-09 RX ORDER — MORPHINE SULFATE 4 MG/ML
4 INJECTION INTRAVENOUS
Status: COMPLETED | OUTPATIENT
Start: 2019-08-09 | End: 2019-08-09

## 2019-08-09 RX ORDER — ONDANSETRON 2 MG/ML
4 INJECTION INTRAMUSCULAR; INTRAVENOUS
Status: COMPLETED | OUTPATIENT
Start: 2019-08-09 | End: 2019-08-09

## 2019-08-09 RX ADMIN — MORPHINE SULFATE 4 MG: 4 INJECTION INTRAVENOUS at 23:29

## 2019-08-09 RX ADMIN — ONDANSETRON 4 MG: 2 INJECTION INTRAMUSCULAR; INTRAVENOUS at 23:29

## 2019-08-09 NOTE — LETTER
NOTIFICATION RETURN TO WORK / SCHOOL 
 
8/10/2019 7:51 AM 
 
Ms. Alo Tracy [facility] [address] To Whom It May Concern: 
 
Damien Usman is currently under the care of  [facility]. She will return to work/school on: Monday, August 12, 2019 Alo Tracy may return to work/school with the following restrictions: no restrictions. If there are questions or concerns please have the patient contact our office. Sincerely, 
 
 
Idalmis Alexis

## 2019-08-09 NOTE — ED TRIAGE NOTES
Patient A/O x 4, complaining of abdominal pain, N/V x 1 month. Patient states she's been having cramping to hands.

## 2019-08-09 NOTE — LETTER
NOTIFICATION RETURN TO WORK / SCHOOL 
 
8/10/2019 7:49 AM 
 
Ms. Alo Mathis [facility] [address] To Whom It May Concern: 
 
Kulwinder Norman is currently under the care of [facility]. She will return to work/school on: 08/12/19 If there are questions or concerns please have the patient contact our office. Sincerely, Vessie Osler Rn

## 2019-08-10 ENCOUNTER — APPOINTMENT (OUTPATIENT)
Dept: CT IMAGING | Age: 39
End: 2019-08-10
Attending: EMERGENCY MEDICINE
Payer: MEDICAID

## 2019-08-10 VITALS
OXYGEN SATURATION: 96 % | TEMPERATURE: 97.8 F | HEART RATE: 74 BPM | BODY MASS INDEX: 28.62 KG/M2 | WEIGHT: 172 LBS | RESPIRATION RATE: 17 BRPM | SYSTOLIC BLOOD PRESSURE: 101 MMHG | DIASTOLIC BLOOD PRESSURE: 63 MMHG

## 2019-08-10 PROCEDURE — 96376 TX/PRO/DX INJ SAME DRUG ADON: CPT

## 2019-08-10 PROCEDURE — 74177 CT ABD & PELVIS W/CONTRAST: CPT

## 2019-08-10 PROCEDURE — 96375 TX/PRO/DX INJ NEW DRUG ADDON: CPT

## 2019-08-10 PROCEDURE — 74011250637 HC RX REV CODE- 250/637: Performed by: EMERGENCY MEDICINE

## 2019-08-10 PROCEDURE — 74011636320 HC RX REV CODE- 636/320: Performed by: EMERGENCY MEDICINE

## 2019-08-10 PROCEDURE — 74011250636 HC RX REV CODE- 250/636: Performed by: EMERGENCY MEDICINE

## 2019-08-10 RX ORDER — PROMETHAZINE HYDROCHLORIDE 25 MG/1
25 TABLET ORAL ONCE
Status: COMPLETED | OUTPATIENT
Start: 2019-08-10 | End: 2019-08-10

## 2019-08-10 RX ORDER — MORPHINE SULFATE 4 MG/ML
4 INJECTION INTRAVENOUS
Status: COMPLETED | OUTPATIENT
Start: 2019-08-10 | End: 2019-08-10

## 2019-08-10 RX ORDER — LORAZEPAM 2 MG/ML
0.5 INJECTION INTRAMUSCULAR
Status: COMPLETED | OUTPATIENT
Start: 2019-08-10 | End: 2019-08-10

## 2019-08-10 RX ORDER — CHLORDIAZEPOXIDE HYDROCHLORIDE 5 MG/1
5 CAPSULE, GELATIN COATED ORAL
Qty: 15 CAP | Refills: 0 | Status: SHIPPED | OUTPATIENT
Start: 2019-08-10 | End: 2019-09-06

## 2019-08-10 RX ORDER — PROMETHAZINE HYDROCHLORIDE 25 MG/1
25 TABLET ORAL
Qty: 12 TAB | Refills: 0 | Status: SHIPPED | OUTPATIENT
Start: 2019-08-10 | End: 2019-09-06

## 2019-08-10 RX ADMIN — IOPAMIDOL 100 ML: 612 INJECTION, SOLUTION INTRAVENOUS at 04:25

## 2019-08-10 RX ADMIN — LORAZEPAM 0.5 MG: 2 INJECTION INTRAMUSCULAR; INTRAVENOUS at 05:32

## 2019-08-10 RX ADMIN — PROMETHAZINE HYDROCHLORIDE 25 MG: 25 TABLET ORAL at 00:52

## 2019-08-10 RX ADMIN — MORPHINE SULFATE 4 MG: 4 INJECTION INTRAVENOUS at 00:53

## 2019-08-10 NOTE — ED NOTES
Patient states, \"Yall aren't sticking me no more! I told yall I am a hard stick. The doctor was only in here for 2 minutes then he left. I want to speak to administration. I am not getting the treatment I deserve. \"    Provider notified. Charge nurse, Alexia Mesa, notified.
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
## 2019-08-10 NOTE — ED NOTES
Patient states she has a history of pancreatitis, admits to drinking vodka today, has abdominal distention that started today and a new rash under each breast.
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
## 2019-08-10 NOTE — ED PROVIDER NOTES
EMERGENCY DEPARTMENT HISTORY AND PHYSICAL EXAM  This was created with voice recognition software and transcription errors may be present. 10:46 PM  Date: 8/9/2019  Patient Name: Shane Jimenez    History of Presenting Illness     Chief Complaint:    History Provided By:     HPI: Shane Jimenez is a 34 y.o. female with a past medical history of endometriosis pancreatitis seizure from alcohol withdrawal who presents with approximately 1 month of abdominal pain. She states she has a history of chronic pancreatitis which she is cared for at [facility] however she just moved from [place]. She states that she has been vomiting for approximately 1 month with diffuse abdominal pain consistent with prior pancreatitis. She states that this is caused her some additional weight loss. PCP: None      Past History     Past Medical History:  Past Medical History:   Diagnosis Date    Endometriosis     Ill-defined condition     meningitis    Liver mass 2016    small liver mass found per pt during last hospital stay    Pancreatitis     Seizures ([address]     due to alcohol withdrawal       Past Surgical History:  Past Surgical History:   Procedure Laterality Date    COLPOSCOPY      HX CHOLECYSTECTOMY      HX LEEP PROCEDURE      LIVER SURGERY PROCEDURE UNLISTED  2004    \"Half of liver removed\"        Family History:  History reviewed. No pertinent family history. Social History:  Social History     Tobacco Use    Smoking status: Current Every Day Smoker     Packs/day: 1.00    Smokeless tobacco: Never Used   Substance Use Topics    Alcohol use: Yes    Drug use: Yes     Types: Marijuana       Allergies: Allergies   Allergen Reactions    Seafood Anaphylaxis     Pt is ok with iodine contrast    Codeine Hives    Penicillins Hives       Review of Systems     Review of Systems   Gastrointestinal: Positive for abdominal pain and nausea. Negative for diarrhea.      10 point review of systems otherwise negative unless noted in HPI. Physical Exam       Physical Exam   Constitutional: She is oriented to person, place, and time. She appears well-developed. HENT:   Head: Normocephalic and atraumatic. Eyes: Pupils are equal, round, and reactive to light. EOM are normal.   Neck: Normal range of motion. Neck supple. Cardiovascular: Normal rate, regular rhythm and normal heart sounds. Exam reveals no friction rub. No murmur heard. Pulmonary/Chest: Effort normal and breath sounds normal. No respiratory distress. She has no wheezes. Abdominal: Soft. She exhibits no distension. There is tenderness. There is no rebound and no guarding. Diffusely tender without rebound or guarding   Musculoskeletal: Normal range of motion. Neurological: She is alert and oriented to person, place, and time. Skin: Skin is warm and dry. Psychiatric: She has a normal mood and affect. Her behavior is normal. Thought content normal.       Diagnostic Study Results     Vital Signs Blood pressure 109/78, pulse 74, temperature 97.8 °F (36.6 °C), resp. rate 17, weight 78 kg (172 lb), last menstrual period 08/01/2019, SpO2 96 %. EKG:  Labs: CBC is normal chemistry is noted. Elevated alk phos lipase is normal  Imaging:     IMPRESSION:      No evidence of an acute abdominal or pelvic process.     Diffuse fatty liver.     Intervally larger hypoattenuating right upper pole renal lesion. Density is  greater than that of simple fluid. Differential includes complex cyst or solid  neoplastic nodule. Consider ultrasound, on a nonemergent basis, to further  characterize. Medical Decision Making     ED Course: Progress Notes, Reevaluation, and Consults:      Provider Notes (Medical Decision Making): This is a 24-year-old female with a history of chronic pancreatitis who presents with approximately 1 month of abdominal pain and vomiting. She states that this is similar to prior pancreatitis.   Exam has diffuse tenderness however there is no rebound or guarding. I do not think she needs imaging at this time. Will treat with nausea medication and pain medication    Patients with annual follow-up since June because she has not had insurance. She states she has cut back on drinking but is still drinking very frequently. Advised that this needs to stop. Likely causing her symptoms. She does have a history of chronic pancreatitis as well. Patient states that she is actually decreased to drinking with her vomiting recently she is normally drinking a bottle a day and has decreased it because of the vomiting. We discussed that and we will obtain a CT here. It may be some signs of pancreatitis. Celexa is as chronic pancreatitis or gastritis or both. She will need GI follow-up. Right now are discussing her cessation of alcohol    Patient states she is feeling anxious and jittery. We will give her a dose of Ativan 0.5 mg we discussed possible outpatient cessation of drinking with options of Librium. She states she used an Ativan taper in the past and was able to stop drinking from November to February. Reviewed the renal mass/cyst with the patient understands there is a chance that his cancer will defer to urology for follow-up    Patient would like Librium she is still interested in quitting drinking. Patient understands that mixing Librium with alcohol may result in her death and agrees not to do so. Requesting Phenergan for nausea  UA neg for LE/N;   Diagnosis     Clinical Impression: No diagnosis found. Disposition:    Patient's Medications   Start Taking    No medications on file   Continue Taking    CHLORPROMAZINE (THORAZINE) 100 MG TABLET    Take 100 mg by mouth three (3) times daily. CITALOPRAM (CELEXA) 40 MG TABLET    Take 40 mg by mouth daily. GABAPENTIN (NEURONTIN) 400 MG CAPSULE    Take 400 mg by mouth three (3) times daily. HYDROXYZINE HCL (ATARAX) 50 MG TABLET    Take 50 mg by mouth four (4) times daily.     LEVETIRACETAM (KEPPRA PO)    Take 600 mg by mouth two (2) times a day. MIRTAZAPINE (REMERON) 15 MG TABLET    Take 15 mg by mouth nightly. PROMETHAZINE (PHENERGAN) 25 MG TABLET    Take 1 Tab by mouth every six (6) hours as needed for Nausea. PROPRANOLOL (INDERAL) 60 MG TABLET    Take 60 mg by mouth two (2) times a day. QUETIAPINE (SEROQUEL) 25 MG TABLET    Take 100 mg by mouth nightly as needed. TRAZODONE (DESYREL) 100 MG TABLET    Take 200 mg by mouth nightly.    These Medications have changed    No medications on file   Stop Taking    No medications on file

## 2019-08-10 NOTE — DISCHARGE INSTRUCTIONS
Learning About Alcohol Withdrawal  What is alcohol withdrawal?    If you drink alcohol regularly (more than a few drinks on most days) and then suddenly stop or cut down, you may go through some physical and emotional problems while the alcohol clears out of your system. This is called withdrawal. Clearing the alcohol from your body is called detoxification, or detox. What are the symptoms? Symptoms of alcohol withdrawal may start as soon as 4 to 12 hours after you stop drinking. Or they may not start until several days after the last drink. Mild symptoms include:  · Nausea. · Sweating. · Shakiness. · Diarrhea. · Intense worry. · Disturbed sleep. · Headache. More severe symptoms include:  · Vomiting or belly pain. · Being confused, upset, and irritable. · Changed sensations. You might feel things on your body that aren't really there. Or you may see or hear things that aren't there. · Trembling. · Being short of breath or having pain in your chest.  · Having seizures. Symptoms may peak within a few days. Mild symptoms can last for a few weeks. If your symptoms are severe, you'll need to see a doctor. What is the treatment for alcohol withdrawal?  Most people may be able to cut down or stop drinking with only mild withdrawal. They can stay safe by simply resting, drinking lots of fluids, and eating healthy foods. But people who drink large amounts of alcohol or are at risk for severe withdrawal symptoms should not try to detox at home unless they work closely with a doctor to manage it. A person can die of severe alcohol withdrawal.  Before you stop drinking, talk to your doctor about how you plan to stop. Be completely honest about how much you've been drinking. Your doctor will figure out if you need to detox in a medical center. You may get medicine to treat the symptoms whether you are at home or in a medical center. Medicine that treats seizures can also help.  Your doctor will explain what types of medicine might help you. You may start with a high dose and then take smaller amounts over several days. There's also medicine that can help you avoid alcohol while you recover. How can you manage your withdrawal and recovery? Here are a few tips that can help you to not start drinking again. · Make sure there's no alcohol in the house. This includes drinks as well as liquid medicines, rubbing alcohol, and certain flavorings like vanilla extract. · Try not to hang out with people you used to drink with. · Don't go it alone. Spend time with people who support the changes you are making in your life. This includes asking for advice and help from people who have stopped drinking. You might also try mutual support groups such as Alcoholics Anonymous. · Drink lots of fluids. · Eat snacks such as fruit, cheese and crackers, and pretzels. High-carbohydrate foods may help reduce the craving for alcohol. What happens after withdrawal?  It can be hard to stop drinking. But after you clear the alcohol from your system, you can start the next, healthier part of your life. After detox, you will focus on staying alcohol-free. You can learn skills that you can use to stay abstinent (or sober) as you recover. Finding new ways to deal with life's challenges, without drinking, takes time and effort. Recovery is a long-term process. It's not something you can achieve in a few weeks. Most people get some type of therapy, such as group counseling. You also may need medicine to help you stay sober. Treatment doesn't focus on alcohol use alone. It may address other parts of your life, like your relationships, work, medical problems, and home life. Treatment, support, patience, and commitment will help you make the changes you need to live a [place] life without alcohol. You may find, over time, that the process gets easier, life becomes more joyous, and your connections to others becomes more rewarding.   Where can you find help? Behavioral Health Treatment Services . This service from the [facility] and [employer]  can help you find local alcohol treatment services. Search online at [employer]. samhsa.gov or call 4-494-906-YCTX ([phone]), or TDD 4-140.597.6753. Where can you learn more? Go to http://willy-yenny.info/. Enter A011 in the search box to learn more about \"Learning About Alcohol Withdrawal.\"  Current as of: February 5, 2019  Content Version: 12.1  © 1518-5104 [employer]. Care instructions adapted under license by [employer] (which disclaims liability or warranty for this information). If you have questions about a medical condition or this instruction, always ask your healthcare professional. [address] any warranty or liability for your use of this information. Patient Education        Abdominal Pain: Care Instructions  Your Care Instructions    Abdominal pain has many possible causes. Some aren't serious and get better on their own in a few days. Others need more testing and treatment. If your pain continues or gets worse, you need to be rechecked and may need more tests to find out what is wrong. You may need surgery to correct the problem. Don't ignore new symptoms, such as fever, nausea and vomiting, urination problems, pain that gets worse, and dizziness. These may be signs of a more serious problem. Your doctor may have recommended a follow-up visit in the next 8 to 12 hours. If you are not getting better, you may need more tests or treatment. The doctor has checked you carefully, but problems can develop later. If you notice any problems or new symptoms, get medical treatment right away. Follow-up care is a key part of your treatment and safety. Be sure to make and go to all appointments, and call your doctor if you are having problems.  It's also a good idea to know your test results and keep a list of the medicines you take. How can you care for yourself at home? · Rest until you feel better. · To prevent dehydration, drink plenty of fluids, enough so that your urine is light yellow or clear like water. Choose water and other caffeine-free clear liquids until you feel better. If you have kidney, heart, or liver disease and have to limit fluids, talk with your doctor before you increase the amount of fluids you drink. · If your stomach is upset, eat mild foods, such as rice, dry toast or crackers, bananas, and applesauce. Try eating several small meals instead of two or three large ones. · Wait until 48 hours after all symptoms have gone away before you have spicy foods, alcohol, and drinks that contain caffeine. · Do not eat foods that are high in fat. · Avoid anti-inflammatory medicines such as aspirin, ibuprofen (Advil, Motrin), and naproxen (Aleve). These can cause stomach upset. Talk to your doctor if you take daily aspirin for another health problem. When should you call for help? Call 911 anytime you think you may need emergency care. For example, call if:    · You passed out (lost consciousness).     · You pass maroon or very bloody stools.     · You vomit blood or what looks like coffee grounds.     · You have new, severe belly pain.    Call your doctor now or seek immediate medical care if:    · Your pain gets worse, especially if it becomes focused in one area of your belly.     · You have a new or higher fever.     · Your stools are black and look like tar, or they have streaks of blood.     · You have unexpected vaginal bleeding.     · You have symptoms of a urinary tract infection. These may include:  ? Pain when you urinate. ? Urinating more often than usual.  ?  Blood in your urine.     · You are dizzy or lightheaded, or you feel like you may faint.    Watch closely for changes in your health, and be sure to contact your doctor if:    · You are not getting better after 1 day (24 hours). Where can you learn more? Go to http://willy-yenny.info/. Enter L419 in the search box to learn more about \"Abdominal Pain: Care Instructions. \"  Current as of: September 23, 2018  Content Version: 12.1  © 3988-7262 [employer]. Care instructions adapted under license by [employer] (which disclaims liability or warranty for this information). If you have questions about a medical condition or this instruction, always ask your healthcare professional. [address] any warranty or liability for your use of this information.

## 2019-08-12 ENCOUNTER — HOSPITAL ENCOUNTER (EMERGENCY)
Age: 39
Discharge: HOME | End: 2019-08-12
Attending: EMERGENCY MEDICINE
Payer: MEDICAID

## 2019-08-12 VITALS — SYSTOLIC BLOOD PRESSURE: 109 MMHG | TEMPERATURE: 97 F | OXYGEN SATURATION: 98 % | DIASTOLIC BLOOD PRESSURE: 75 MMHG

## 2019-08-12 DIAGNOSIS — F10.10 ALCOHOL USE DISORDER, MILD, ABUSE: ICD-10-CM

## 2019-08-12 DIAGNOSIS — R10.13 EPIGASTRIC PAIN: Primary | ICD-10-CM

## 2019-08-12 LAB
ALBUMIN SERPL-MCNC: 3.4 G/DL (ref 3.4–5)
ALBUMIN/GLOB SERPL: 0.9 {RATIO} (ref 0.8–1.7)
ALP SERPL-CCNC: 131 U/L (ref 45–117)
ALT SERPL-CCNC: 27 U/L (ref 13–56)
ANION GAP SERPL CALC-SCNC: 11 MMOL/L (ref 3–18)
AST SERPL-CCNC: 41 U/L (ref 10–38)
BASOPHILS # BLD: 0 K/UL (ref 0–0.1)
BASOPHILS NFR BLD: 0 % (ref 0–2)
BILIRUB SERPL-MCNC: 0.3 MG/DL (ref 0.2–1)
BUN SERPL-MCNC: 6 MG/DL (ref 7–18)
BUN/CREAT SERPL: 12 (ref 12–20)
CALCIUM SERPL-MCNC: 9 MG/DL (ref 8.5–10.1)
CHLORIDE SERPL-SCNC: 106 MMOL/L (ref 100–111)
CO2 SERPL-SCNC: 27 MMOL/L (ref 21–32)
CREAT SERPL-MCNC: 0.52 MG/DL (ref 0.6–1.3)
DIFFERENTIAL METHOD BLD: ABNORMAL
EOSINOPHIL # BLD: 0.2 K/UL (ref 0–0.4)
EOSINOPHIL NFR BLD: 3 % (ref 0–5)
ERYTHROCYTE [DISTWIDTH] IN BLOOD BY AUTOMATED COUNT: 16.3 % (ref 11.6–14.5)
GLOBULIN SER CALC-MCNC: 4 G/DL (ref 2–4)
GLUCOSE SERPL-MCNC: 78 MG/DL (ref 74–99)
HCT VFR BLD AUTO: 36 % (ref 35–45)
HGB BLD-MCNC: 12.6 G/DL (ref 12–16)
LIPASE SERPL-CCNC: 70 U/L (ref 73–393)
LYMPHOCYTES # BLD: 1.7 K/UL (ref 0.9–3.6)
LYMPHOCYTES NFR BLD: 37 % (ref 21–52)
MCH RBC QN AUTO: 31 PG (ref 24–34)
MCHC RBC AUTO-ENTMCNC: 35 G/DL (ref 31–37)
MCV RBC AUTO: 88.7 FL (ref 74–97)
MONOCYTES # BLD: 0.3 K/UL (ref 0.05–1.2)
MONOCYTES NFR BLD: 6 % (ref 3–10)
NEUTS SEG # BLD: 2.5 K/UL (ref 1.8–8)
NEUTS SEG NFR BLD: 54 % (ref 40–73)
PLATELET # BLD AUTO: 230 K/UL (ref 135–420)
PMV BLD AUTO: 9.7 FL (ref 9.2–11.8)
POTASSIUM SERPL-SCNC: 3.3 MMOL/L (ref 3.5–5.5)
PROT SERPL-MCNC: 7.4 G/DL (ref 6.4–8.2)
RBC # BLD AUTO: 4.06 M/UL (ref 4.2–5.3)
SODIUM SERPL-SCNC: 144 MMOL/L (ref 136–145)
WBC # BLD AUTO: 4.6 K/UL (ref 4.6–13.2)

## 2019-08-12 PROCEDURE — 96375 TX/PRO/DX INJ NEW DRUG ADDON: CPT

## 2019-08-12 PROCEDURE — 83690 ASSAY OF LIPASE: CPT

## 2019-08-12 PROCEDURE — 74011250636 HC RX REV CODE- 250/636: Performed by: STUDENT IN AN ORGANIZED HEALTH CARE EDUCATION/TRAINING PROGRAM

## 2019-08-12 PROCEDURE — 99284 EMERGENCY DEPT VISIT MOD MDM: CPT

## 2019-08-12 PROCEDURE — 96374 THER/PROPH/DIAG INJ IV PUSH: CPT

## 2019-08-12 PROCEDURE — 85025 COMPLETE CBC W/AUTO DIFF WBC: CPT

## 2019-08-12 PROCEDURE — 80053 COMPREHEN METABOLIC PANEL: CPT

## 2019-08-12 RX ORDER — LORAZEPAM 2 MG/ML
1 INJECTION INTRAMUSCULAR ONCE
Status: COMPLETED | OUTPATIENT
Start: 2019-08-12 | End: 2019-08-12

## 2019-08-12 RX ORDER — METOCLOPRAMIDE HYDROCHLORIDE 5 MG/ML
10 INJECTION INTRAMUSCULAR; INTRAVENOUS ONCE
Status: DISCONTINUED | OUTPATIENT
Start: 2019-08-12 | End: 2019-08-12

## 2019-08-12 RX ADMIN — LORAZEPAM 1 MG: 2 INJECTION INTRAMUSCULAR; INTRAVENOUS at 04:47

## 2019-08-12 RX ADMIN — SODIUM CHLORIDE 1000 ML: 900 INJECTION, SOLUTION INTRAVENOUS at 04:46

## 2019-08-12 NOTE — DISCHARGE INSTRUCTIONS
Patient Education      Your pancreas is normal this evening. With your pain, you most likely have inflammation of the stomach or a stomach ulcer. The only way to tell for sure is to see a GI doctor so they can look in your stomach. Return to the ER if you notice worsening of you symptoms, or any other new symptoms that you find concerning. Abdominal Pain: Care Instructions  Your Care Instructions    Abdominal pain has many possible causes. Some aren't serious and get better on their own in a few days. Others need more testing and treatment. If your pain continues or gets worse, you need to be rechecked and may need more tests to find out what is wrong. You may need surgery to correct the problem. Don't ignore new symptoms, such as fever, nausea and vomiting, urination problems, pain that gets worse, and dizziness. These may be signs of a more serious problem. Your doctor may have recommended a follow-up visit in the next 8 to 12 hours. If you are not getting better, you may need more tests or treatment. The doctor has checked you carefully, but problems can develop later. If you notice any problems or new symptoms, get medical treatment right away. Follow-up care is a key part of your treatment and safety. Be sure to make and go to all appointments, and call your doctor if you are having problems. It's also a good idea to know your test results and keep a list of the medicines you take. How can you care for yourself at home? · Rest until you feel better. · To prevent dehydration, drink plenty of fluids, enough so that your urine is light yellow or clear like water. Choose water and other caffeine-free clear liquids until you feel better. If you have kidney, heart, or liver disease and have to limit fluids, talk with your doctor before you increase the amount of fluids you drink. · If your stomach is upset, eat mild foods, such as rice, dry toast or crackers, bananas, and applesauce.  Try eating several small meals instead of two or three large ones. · Wait until 48 hours after all symptoms have gone away before you have spicy foods, alcohol, and drinks that contain caffeine. · Do not eat foods that are high in fat. · Avoid anti-inflammatory medicines such as aspirin, ibuprofen (Advil, Motrin), and naproxen (Aleve). These can cause stomach upset. Talk to your doctor if you take daily aspirin for another health problem. When should you call for help? Call 911 anytime you think you may need emergency care. For example, call if:    · You passed out (lost consciousness).     · You pass maroon or very bloody stools.     · You vomit blood or what looks like coffee grounds.     · You have new, severe belly pain.    Call your doctor now or seek immediate medical care if:    · Your pain gets worse, especially if it becomes focused in one area of your belly.     · You have a new or higher fever.     · Your stools are black and look like tar, or they have streaks of blood.     · You have unexpected vaginal bleeding.     · You have symptoms of a urinary tract infection. These may include:  ? Pain when you urinate. ? Urinating more often than usual.  ? Blood in your urine.     · You are dizzy or lightheaded, or you feel like you may faint.    Watch closely for changes in your health, and be sure to contact your doctor if:    · You are not getting better after 1 day (24 hours). Where can you learn more? Go to http://willy-yenny.info/. Enter I873 in the search box to learn more about \"Abdominal Pain: Care Instructions. \"  Current as of: September 23, 2018  Content Version: 12.1  © 1079-0916 [employer]. Care instructions adapted under license by [employer] (which disclaims liability or warranty for this information).  If you have questions about a medical condition or this instruction, always ask your healthcare professional. [employer] disclaims any warranty or liability for your use of this information.

## 2019-08-12 NOTE — ED PROVIDER NOTES
EMERGENCY DEPARTMENT HISTORY AND PHYSICAL EXAM      Date: 8/12/2019  Patient Name: Hernesto Rod    History of Presenting Illness     Chief Complaint   Patient presents with    Nausea    Vomiting    Abdominal Pain       History Provided By: Patient    Chief Complaint: Chronic nausea/abdominal pain  Duration: Many Months  Timing:  Gradual  Location: Epigastric  Quality: Dull  Severity: Severe  Modifying Factors: No modifying factors  Associated Symptoms: denies any other associated signs or symptoms      Additional History (Context): Hernesto Rod is a 34 y.o. female with complex medical history of alcoholism, recent diagnosis of complex right kidney cyst, chronic pancreatitis, that presents with many months of vomiting and epigastric pain. No fevers or chills. Was seen in our department a few days ago with a negative workup. Discharged with librium at that time which she says did not help her symptoms of attempting to stop drinking. Consumed alcohol today. Has history of complex withdrawals. PCP: None    Current Facility-Administered Medications   Medication Dose Route Frequency Provider Last Rate Last Dose    promethazine (PHENERGAN) 25 mg in 0.9% sodium chloride 50 mL IVPB  25 mg IntraVENous ONCE Benny Mendoza MD   Stopped at 08/12/19 7832     Current Outpatient Medications   Medication Sig Dispense Refill    promethazine (PHENERGAN) 25 mg tablet Take 1 Tab by mouth every six (6) hours as needed for Nausea. 12 Tab 0    chlordiazePOXIDE (LIBRIUM) 5 mg capsule Take 1 Cap by mouth three (3) times daily as needed (alcohol withdrawal). Max Daily Amount: 15 mg. 15 Cap 0    levetiracetam (KEPPRA PO) Take 600 mg by mouth two (2) times a day.  propranolol (INDERAL) 60 mg tablet Take 60 mg by mouth two (2) times a day.  chlorproMAZINE (THORAZINE) 100 mg tablet Take 100 mg by mouth three (3) times daily.  QUEtiapine (SEROQUEL) 25 mg tablet Take 100 mg by mouth nightly as needed.  gabapentin (NEURONTIN) 400 mg capsule Take 400 mg by mouth three (3) times daily.  traZODone (DESYREL) 100 mg tablet Take 200 mg by mouth nightly.  citalopram (CELEXA) 40 mg tablet Take 40 mg by mouth daily.  hydrOXYzine HCl (ATARAX) 50 mg tablet Take 50 mg by mouth four (4) times daily.  mirtazapine (REMERON) 15 mg tablet Take 15 mg by mouth nightly. Past History     Past Medical History:  Past Medical History:   Diagnosis Date    Endometriosis     Ill-defined condition     meningitis    Liver mass 2016    small liver mass found per pt during last hospital stay    Pancreatitis     Seizures ([facility] [address]     due to alcohol withdrawal       Past Surgical History:  Past Surgical History:   Procedure Laterality Date    COLPOSCOPY      HX CHOLECYSTECTOMY      HX LEEP PROCEDURE      LIVER SURGERY PROCEDURE UNLISTED  2004    \"Half of liver removed\"        Family History:  History reviewed. No pertinent family history. Social History:  Social History     Tobacco Use    Smoking status: Current Every Day Smoker     Packs/day: 1.00    Smokeless tobacco: Never Used   Substance Use Topics    Alcohol use: Yes    Drug use: Yes     Types: Marijuana       Allergies: Allergies   Allergen Reactions    Seafood Anaphylaxis     Pt is ok with iodine contrast    Codeine Hives    Penicillins Hives         Review of Systems   Review of Systems   Constitutional: Negative for chills, diaphoresis and fever. HENT: Negative for nosebleeds, sinus pressure and trouble swallowing. Eyes: Negative for photophobia and visual disturbance. Respiratory: Negative for chest tightness and shortness of breath. Cardiovascular: Negative for chest pain and leg swelling. Gastrointestinal: Positive for abdominal pain, nausea and vomiting. Negative for blood in stool and diarrhea. Endocrine: Negative for polyuria. Genitourinary: Negative for dysuria, flank pain and frequency.    Musculoskeletal: Negative for myalgias, neck pain and neck stiffness. Skin: Negative for rash. Neurological: Negative for dizziness, speech difficulty, light-headedness, numbness and headaches. Psychiatric/Behavioral: Negative for self-injury and suicidal ideas. All other systems reviewed and are negative. Physical Exam     Vitals:    08/12/19 0500   BP: 113/63   SpO2: 100%     Physical Exam   Constitutional: She is oriented to person, place, and time. She appears well-developed and well-nourished. HENT:   Head: Normocephalic and atraumatic. Eyes: Pupils are equal, round, and reactive to light. Conjunctivae and EOM are normal.   Neck: Normal range of motion. Neck supple. Cardiovascular: Normal rate and regular rhythm. No murmur heard. Pulmonary/Chest: Effort normal and breath sounds normal.   Abdominal: Soft. Bowel sounds are normal. There is tenderness. Epigastric area   Musculoskeletal: Normal range of motion. Neurological: She is alert and oriented to person, place, and time. Skin: Skin is warm and dry. Psychiatric: She has a normal mood and affect. Nursing note and vitals reviewed. Diagnostic Study Results     Labs -     Recent Results (from the past 12 hour(s))   CBC WITH AUTOMATED DIFF    Collection Time: 08/12/19  3:35 AM   Result Value Ref Range    WBC 4.6 4.6 - 13.2 K/uL    RBC 4.06 (L) 4.20 - 5.30 M/uL    HGB 12.6 12.0 - 16.0 g/dL    HCT 36.0 35.0 - 45.0 %    MCV 88.7 74.0 - 97.0 FL    MCH 31.0 24.0 - 34.0 PG    MCHC 35.0 31.0 - 37.0 g/dL    RDW 16.3 (H) 11.6 - 14.5 %    PLATELET 822 163 - 890 K/uL    MPV 9.7 9.2 - 11.8 FL    NEUTROPHILS 54 40 - 73 %    LYMPHOCYTES 37 21 - 52 %    MONOCYTES 6 3 - 10 %    EOSINOPHILS 3 0 - 5 %    BASOPHILS 0 0 - 2 %    ABS. NEUTROPHILS 2.5 1.8 - 8.0 K/UL    ABS. LYMPHOCYTES 1.7 0.9 - 3.6 K/UL    ABS. MONOCYTES 0.3 0.05 - 1.2 K/UL    ABS. EOSINOPHILS 0.2 0.0 - 0.4 K/UL    ABS.  BASOPHILS 0.0 0.0 - 0.1 K/UL    DF AUTOMATED     METABOLIC PANEL, COMPREHENSIVE Collection Time: 08/12/19  3:35 AM   Result Value Ref Range    Sodium 144 136 - 145 mmol/L    Potassium 3.3 (L) 3.5 - 5.5 mmol/L    Chloride 106 100 - 111 mmol/L    CO2 27 21 - 32 mmol/L    Anion gap 11 3.0 - 18 mmol/L    Glucose 78 74 - 99 mg/dL    BUN 6 (L) 7.0 - 18 MG/DL    Creatinine 0.52 (L) 0.6 - 1.3 MG/DL    BUN/Creatinine ratio 12 12 - 20      GFR est AA >60 >60 ml/min/1.73m2    GFR est non-AA >60 >60 ml/min/1.73m2    Calcium 9.0 8.5 - 10.1 MG/DL    Bilirubin, total 0.3 0.2 - 1.0 MG/DL    ALT (SGPT) 27 13 - 56 U/L    AST (SGOT) 41 (H) 10 - 38 U/L    Alk. phosphatase 131 (H) 45 - 117 U/L    Protein, total 7.4 6.4 - 8.2 g/dL    Albumin 3.4 3.4 - 5.0 g/dL    Globulin 4.0 2.0 - 4.0 g/dL    A-G Ratio 0.9 0.8 - 1.7     LIPASE    Collection Time: 08/12/19  3:35 AM   Result Value Ref Range    Lipase 70 (L) 73 - 393 U/L       Radiologic Studies -   No orders to display     CT Results  (Last 48 hours)    None        CXR Results  (Last 48 hours)    None            Medical Decision Making   I am the first provider for this patient. I reviewed the vital signs, available nursing notes, past medical history, past surgical history, family history and social history. Vital Signs-Reviewed the patient's vital signs. ED Course:   ED Course as of Aug 12 0602   Mon Aug 12, 2019   9683 Doubt pancreatitis. LIPASE(!):    Lipase 70(!) [DE]   0455 Unremarkable   CBC WITH AUTOMATED DIFF(!):    WBC 4.6   RBC 4.06(!)   HGB 12.6   HCT 36.0   MCV 88.7   MCH 31.0   MCHC 35.0   RDW 16.3(!)   PLATELET 123   MPV 9.7   NEUTROPHILS 54   LYMPHOCYTES 37   MONOCYTES 6   EOSINOPHILS 3   BASOPHILS 0   ABS. NEUTROPHILS 2.5   ABS. LYMPHOCYTES 1.7   ABS. MONOCYTES 0.3   ABS. EOSINOPHILS 0.2   ABS. BASOPHILS 0.0   DF AUTOMATED [DE]   0455 Mild elevation of AST to 41, potassium 3.3, otherwise unremarkable.    METABOLIC PANEL, COMPREHENSIVE(!):    Sodium 144   Potassium 3.3(!)   Chloride 106   CO2 27   Anion gap 11   Glucose 78   BUN 6(!) Creatinine 0.52(!)   BUN/Creatinine ratio 12   GFR est AA >60   GFR est non-AA >60   Calcium 9.0   Bilirubin, total 0.3   ALT (SGPT) 27   AST 41(!)   Alk. phosphatase 131(!)   Protein, total 7.4   Albumin 3.4   Globulin 4.0   A-G Ratio 0.9 [DE]   0456 Patient asking for IV phenergan by name. Does not want to try Reglan. [DE]   [id number] Patient clinically appears very well. Has not vomited in the department. In regards to hematemesis, most likely inessa chicas tear. Ativan given as patient has a history of complex withdrawals. [DE]   0031 At her last visit, was giving referral to Gi. Recommended that she make an appointment for scope. Was previously prescribed Librium which she can take if she is able to stop consuming alcohol.    [DE]   0619 Patient asking for Ativan script. Told her that we do not write for this from the Er. Would be happy to write for Librium, but patient says she is not interested. [DE]      ED Course User Index  [DE] Yulisa Fall MD         Disposition:  Discharged    DISCHARGE NOTE:     Pt has been reexamined. Patient has no new complaints, changes, or physical findings. Care plan outlined and precautions discussed. Results of labs were reviewed with the patient. All medications were reviewed with the patient. All of pt's questions and concerns were addressed. Patient was instructed and agrees to follow up with PCM and instructed to contact GI as previously recommended, as well as to return to the ED upon further deterioration. Patient is ready to go home.     Follow-up Information     Follow up With Specialties Details Why Contact Ata Phillips   [address] [facility] [address] [phone]          Current Discharge Medication List      CONTINUE these medications which have NOT CHANGED    Details   promethazine (PHENERGAN) 25 mg tablet Take 1 Tab by mouth every six (6) hours as needed for Nausea. Qty: 12 Tab, Refills: 0      chlordiazePOXIDE (LIBRIUM) 5 mg capsule Take 1 Cap by mouth three (3) times daily as needed (alcohol withdrawal). Max Daily Amount: 15 mg.  Qty: 15 Cap, Refills: 0    Associated Diagnoses: Alcohol dependence with withdrawal with complication (HCC)      levetiracetam (KEPPRA PO) Take 600 mg by mouth two (2) times a day. propranolol (INDERAL) 60 mg tablet Take 60 mg by mouth two (2) times a day. chlorproMAZINE (THORAZINE) 100 mg tablet Take 100 mg by mouth three (3) times daily. QUEtiapine (SEROQUEL) 25 mg tablet Take 100 mg by mouth nightly as needed. gabapentin (NEURONTIN) 400 mg capsule Take 400 mg by mouth three (3) times daily. traZODone (DESYREL) 100 mg tablet Take 200 mg by mouth nightly. citalopram (CELEXA) 40 mg tablet Take 40 mg by mouth daily. hydrOXYzine HCl (ATARAX) 50 mg tablet Take 50 mg by mouth four (4) times daily. mirtazapine (REMERON) 15 mg tablet Take 15 mg by mouth nightly. Diagnosis     Clinical Impression:   1. Epigastric pain    2.  Alcohol use disorder, mild, abuse

## 2019-08-12 NOTE — ED NOTES
Discharge instructions discussed with patient. Patient verbalized understanding. Patient ambulated out of ED.

## 2019-08-12 NOTE — ED NOTES
received a pager from ED on 08/10/2019 to assist patient with follow up care and insurance. I called patient today at 11:26 am.  We briefly spoke about the reason of my call. Patient states she will contact  office later. A contact letter with  contact information placed in outgoing mailbox.

## 2019-09-10 ENCOUNTER — HOSPITAL ENCOUNTER (EMERGENCY)
Age: 39
Discharge: HOME | End: 2019-09-10
Attending: EMERGENCY MEDICINE
Payer: MEDICAID

## 2019-09-10 VITALS
HEART RATE: 88 BPM | RESPIRATION RATE: 16 BRPM | OXYGEN SATURATION: 99 % | SYSTOLIC BLOOD PRESSURE: 118 MMHG | BODY MASS INDEX: 28.72 KG/M2 | DIASTOLIC BLOOD PRESSURE: 72 MMHG | HEIGHT: 65 IN | TEMPERATURE: 98 F | WEIGHT: 172.4 LBS

## 2019-09-10 DIAGNOSIS — F10.920 ALCOHOLIC INTOXICATION WITHOUT COMPLICATION (HCC): Primary | ICD-10-CM

## 2019-09-10 DIAGNOSIS — R10.9 FLANK PAIN: ICD-10-CM

## 2019-09-10 LAB
ALBUMIN SERPL-MCNC: 3.5 G/DL (ref 3.4–5)
ALBUMIN/GLOB SERPL: 0.7 {RATIO} (ref 0.8–1.7)
ALP SERPL-CCNC: 157 U/L (ref 45–117)
ALT SERPL-CCNC: 56 U/L (ref 13–56)
ANION GAP SERPL CALC-SCNC: 5 MMOL/L (ref 3–18)
APPEARANCE UR: CLEAR
AST SERPL-CCNC: 101 U/L (ref 10–38)
BACTERIA URNS QL MICRO: NEGATIVE /HPF
BASOPHILS # BLD: 0 K/UL (ref 0–0.1)
BASOPHILS NFR BLD: 0 % (ref 0–2)
BILIRUB SERPL-MCNC: 0.3 MG/DL (ref 0.2–1)
BILIRUB UR QL: NEGATIVE
BUN SERPL-MCNC: 6 MG/DL (ref 7–18)
BUN/CREAT SERPL: 9 (ref 12–20)
CALCIUM SERPL-MCNC: 8.9 MG/DL (ref 8.5–10.1)
CHLORIDE SERPL-SCNC: 105 MMOL/L (ref 100–111)
CO2 SERPL-SCNC: 29 MMOL/L (ref 21–32)
COLOR UR: YELLOW
CREAT SERPL-MCNC: 0.66 MG/DL (ref 0.6–1.3)
DIFFERENTIAL METHOD BLD: ABNORMAL
EOSINOPHIL # BLD: 0.1 K/UL (ref 0–0.4)
EOSINOPHIL NFR BLD: 3 % (ref 0–5)
EPITH CASTS URNS QL MICRO: ABNORMAL /LPF (ref 0–5)
ERYTHROCYTE [DISTWIDTH] IN BLOOD BY AUTOMATED COUNT: 16.3 % (ref 11.6–14.5)
ETHANOL SERPL-MCNC: 119 MG/DL (ref 0–3)
GLOBULIN SER CALC-MCNC: 5 G/DL (ref 2–4)
GLUCOSE SERPL-MCNC: 81 MG/DL (ref 74–99)
GLUCOSE UR STRIP.AUTO-MCNC: NEGATIVE MG/DL
HCG UR QL: NEGATIVE
HCT VFR BLD AUTO: 34.9 % (ref 35–45)
HGB BLD-MCNC: 12 G/DL (ref 12–16)
HGB UR QL STRIP: ABNORMAL
KETONES UR QL STRIP.AUTO: NEGATIVE MG/DL
LEUKOCYTE ESTERASE UR QL STRIP.AUTO: NEGATIVE
LIPASE SERPL-CCNC: 44 U/L (ref 73–393)
LYMPHOCYTES # BLD: 1.7 K/UL (ref 0.9–3.6)
LYMPHOCYTES NFR BLD: 33 % (ref 21–52)
MAGNESIUM SERPL-MCNC: 1.9 MG/DL (ref 1.6–2.6)
MCH RBC QN AUTO: 31.2 PG (ref 24–34)
MCHC RBC AUTO-ENTMCNC: 34.4 G/DL (ref 31–37)
MCV RBC AUTO: 90.6 FL (ref 74–97)
MONOCYTES # BLD: 0.2 K/UL (ref 0.05–1.2)
MONOCYTES NFR BLD: 4 % (ref 3–10)
MUCOUS THREADS URNS QL MICRO: ABNORMAL /LPF
NEUTS SEG # BLD: 3.1 K/UL (ref 1.8–8)
NEUTS SEG NFR BLD: 60 % (ref 40–73)
NITRITE UR QL STRIP.AUTO: NEGATIVE
PH UR STRIP: 5.5 [PH] (ref 5–8)
PLATELET # BLD AUTO: 358 K/UL (ref 135–420)
PMV BLD AUTO: 9.2 FL (ref 9.2–11.8)
POTASSIUM SERPL-SCNC: 3.8 MMOL/L (ref 3.5–5.5)
PROT SERPL-MCNC: 8.5 G/DL (ref 6.4–8.2)
PROT UR STRIP-MCNC: NEGATIVE MG/DL
RBC # BLD AUTO: 3.85 M/UL (ref 4.2–5.3)
RBC #/AREA URNS HPF: ABNORMAL /HPF (ref 0–5)
SODIUM SERPL-SCNC: 139 MMOL/L (ref 136–145)
SP GR UR REFRACTOMETRY: 1.02 (ref 1–1.03)
UROBILINOGEN UR QL STRIP.AUTO: 1 EU/DL (ref 0.2–1)
WBC # BLD AUTO: 5.2 K/UL (ref 4.6–13.2)
WBC URNS QL MICRO: NEGATIVE /HPF (ref 0–4)

## 2019-09-10 PROCEDURE — 81025 URINE PREGNANCY TEST: CPT

## 2019-09-10 PROCEDURE — 81001 URINALYSIS AUTO W/SCOPE: CPT

## 2019-09-10 PROCEDURE — 83690 ASSAY OF LIPASE: CPT

## 2019-09-10 PROCEDURE — 99282 EMERGENCY DEPT VISIT SF MDM: CPT

## 2019-09-10 PROCEDURE — 85025 COMPLETE CBC W/AUTO DIFF WBC: CPT

## 2019-09-10 PROCEDURE — 83735 ASSAY OF MAGNESIUM: CPT

## 2019-09-10 PROCEDURE — 80053 COMPREHEN METABOLIC PANEL: CPT

## 2019-09-10 PROCEDURE — 80307 DRUG TEST PRSMV CHEM ANLYZR: CPT

## 2019-09-10 RX ORDER — PROMETHAZINE HYDROCHLORIDE 25 MG/1
25 TABLET ORAL
Qty: 12 TAB | Refills: 0 | Status: SHIPPED | OUTPATIENT
Start: 2019-09-10 | End: 2019-11-29

## 2019-09-10 RX ORDER — METOCLOPRAMIDE HYDROCHLORIDE 5 MG/ML
10 INJECTION INTRAMUSCULAR; INTRAVENOUS
Status: DISCONTINUED | OUTPATIENT
Start: 2019-09-10 | End: 2019-09-11 | Stop reason: HOSPADM

## 2019-09-10 RX ORDER — ONDANSETRON 4 MG/1
8 TABLET, FILM COATED ORAL
Qty: 12 TAB | Refills: 0 | Status: SHIPPED | OUTPATIENT
Start: 2019-09-10 | End: 2019-09-10

## 2019-09-11 NOTE — ED PROVIDER NOTES
EMERGENCY DEPARTMENT HISTORY AND PHYSICAL EXAM    Date: 9/10/2019  Patient Name: Shannan Zuluaga    History of Presenting Illness     Chief Complaint   Patient presents with    Abdominal Pain    Back Pain    Vomiting         History Provided By: Patient  Additional History (Context): Shannan Zuluaga is a 34 y.o. female with seizure and alcohol abuse, pancreatitis, alcohol withdrawal seizures who presents with persistent urinary frequency as well as that she was referred to Dr. Mark Burns for urology whom she saw on September 6. Has a renal ultrasound scheduled in 2 days. She was diagnosed with a right renal cyst 1.9 cm a month for her symptoms of epigastric discomfort. Additionally, pain patient is complaining of nausea vomiting for the past 2 days. Had a bowel movement this morning. Denies elizabeth hematemesis. Surgical history includes liver lobectomy and cholecystectomy. PCP: None    Current Facility-Administered Medications   Medication Dose Route Frequency Provider Last Rate Last Dose    metoclopramide HCl (REGLAN) injection 10 mg  10 mg IntraVENous NOW Bekah Arreaga PA        sodium chloride 0.9 % bolus infusion 1,000 mL  1,000 mL IntraVENous ONCE Bekah Arreaga PA         Current Outpatient Medications   Medication Sig Dispense Refill    ondansetron hcl (ZOFRAN) 4 mg tablet Take 2 Tabs by mouth every eight (8) hours as needed for Nausea. 12 Tab 0    gabapentin (NEURONTIN) 600 mg tablet Take  by mouth three (3) times daily.  carBAMazepine (TEGRETOL) 200 mg tablet Take 200 mg by mouth.  folic acid (FOLVITE) 1 mg tablet Take 1 mg by mouth.  omeprazole (PRILOSEC) 20 mg capsule Take 20 mg by mouth.  levetiracetam (KEPPRA PO) Take 600 mg by mouth two (2) times a day.  QUEtiapine (SEROQUEL) 25 mg tablet Take 100 mg by mouth nightly as needed.  traZODone (DESYREL) 100 mg tablet Take 100 mg by mouth nightly.       hydrOXYzine HCl (ATARAX) 50 mg tablet Take 50 mg by mouth four (4) times daily. Past History     Past Medical History:  Past Medical History:   Diagnosis Date    Endometriosis     Ill-defined condition     meningitis    Liver mass 2016    small liver mass found per pt during last hospital stay    Pancreatitis     Renal cyst     Seizures ([address]     due to alcohol withdrawal    Severe dysplasia of cervix        Past Surgical History:  Past Surgical History:   Procedure Laterality Date    COLPOSCOPY      x 2    HX CHOLECYSTECTOMY      HX LEEP PROCEDURE      LIVER SURGERY PROCEDURE UNLISTED  2004    \"Half of liver removed\"        Family History:  Family History   Problem Relation Age of Onset    Cancer Mother         Cervical and uterine cancer at age 39    Hypertension Mother    [facility] COPD Mother     Hypertension Father     Kidney Disease Maternal Aunt     Kidney Disease Maternal Uncle     Hypertension Maternal Grandmother     Other Other         Gallbladder problems and kidnsy stones problems on mom side       Social History:  Social History     Tobacco Use    Smoking status: Current Every Day Smoker     Packs/day: 1.00     Years: 22.00     Pack years: 22.00    Smokeless tobacco: Never Used   Substance Use Topics    Alcohol use: Yes     Comment: Alot.  Drug use: Yes     Types: Marijuana     Comment: Last used 2 1/2 weeks ago. Allergies: Allergies   Allergen Reactions    Seafood Anaphylaxis     Pt is ok with iodine contrast. Patient is allergic to shellfish.  Codeine Hives    Penicillins Hives         Review of Systems   Review of Systems   Constitutional: Negative. Negative for fever. HENT: Negative. Eyes: Negative. Respiratory: Negative. Cardiovascular: Negative. Gastrointestinal: Positive for abdominal pain, nausea and vomiting. Negative for blood in stool, constipation and diarrhea. Endocrine: Negative. Genitourinary: Positive for flank pain and frequency. Musculoskeletal: Negative for back pain.    Skin: Negative. Allergic/Immunologic: Negative. Neurological: Negative. Hematological: Negative. Psychiatric/Behavioral: Negative. All Other Systems Negative  Physical Exam     Vitals:    09/10/19 1957   BP: 118/72   Pulse: 88   Resp: 16   Temp: 98 °F (36.7 °C)   SpO2: 99%   Weight: 78.2 kg (172 lb 6.4 oz)   Height: 5' 5\" (1.651 m)     Physical Exam   Constitutional: She is oriented to person, place, and time. She appears well-developed. HENT:   Head: Normocephalic and atraumatic. Eyes: Pupils are equal, round, and reactive to light. Neck: No JVD present. No tracheal deviation present. No thyromegaly present. Cardiovascular: Normal rate, regular rhythm and normal heart sounds. Exam reveals no gallop and no friction rub. No murmur heard. Pulmonary/Chest: Effort normal and breath sounds normal. No stridor. No respiratory distress. She has no wheezes. She has no rales. She exhibits no tenderness. Abdominal: Soft. She exhibits no distension and no mass. There is no tenderness. There is no rebound and no guarding. Musculoskeletal: She exhibits tenderness. She exhibits no edema. Inferior CVA tenderness on the right, mild. Lymphadenopathy:     She has no cervical adenopathy. Neurological: She is alert and oriented to person, place, and time. Skin: Skin is warm and dry. No rash noted. No erythema. No pallor. Psychiatric: She has a normal mood and affect. Her behavior is normal. Thought content normal.   Nursing note and vitals reviewed.          Diagnostic Study Results     Labs -     Recent Results (from the past 12 hour(s))   URINALYSIS W/ RFLX MICROSCOPIC    Collection Time: 09/10/19  8:00 PM   Result Value Ref Range    Color YELLOW      Appearance CLEAR      Specific gravity 1.017 1.005 - 1.030      pH (UA) 5.5 5.0 - 8.0      Protein NEGATIVE  NEG mg/dL    Glucose NEGATIVE  NEG mg/dL    Ketone NEGATIVE  NEG mg/dL    Bilirubin NEGATIVE  NEG      Blood TRACE (A) NEG      Urobilinogen 1.0 0.2 - 1.0 EU/dL    Nitrites NEGATIVE  NEG      Leukocyte Esterase NEGATIVE  NEG     HCG URINE, QL    Collection Time: 09/10/19  8:00 PM   Result Value Ref Range    HCG urine, QL NEGATIVE  NEG     CBC WITH AUTOMATED DIFF    Collection Time: 09/10/19  8:32 PM   Result Value Ref Range    WBC 5.2 4.6 - 13.2 K/uL    RBC 3.85 (L) 4.20 - 5.30 M/uL    HGB 12.0 12.0 - 16.0 g/dL    HCT 34.9 (L) 35.0 - 45.0 %    MCV 90.6 74.0 - 97.0 FL    MCH 31.2 24.0 - 34.0 PG    MCHC 34.4 31.0 - 37.0 g/dL    RDW 16.3 (H) 11.6 - 14.5 %    PLATELET 963 155 - 906 K/uL    MPV 9.2 9.2 - 11.8 FL    NEUTROPHILS 60 40 - 73 %    LYMPHOCYTES 33 21 - 52 %    MONOCYTES 4 3 - 10 %    EOSINOPHILS 3 0 - 5 %    BASOPHILS 0 0 - 2 %    ABS. NEUTROPHILS 3.1 1.8 - 8.0 K/UL    ABS. LYMPHOCYTES 1.7 0.9 - 3.6 K/UL    ABS. MONOCYTES 0.2 0.05 - 1.2 K/UL    ABS. EOSINOPHILS 0.1 0.0 - 0.4 K/UL    ABS. BASOPHILS 0.0 0.0 - 0.1 K/UL    DF AUTOMATED     METABOLIC PANEL, COMPREHENSIVE    Collection Time: 09/10/19  8:32 PM   Result Value Ref Range    Sodium 139 136 - 145 mmol/L    Potassium 3.8 3.5 - 5.5 mmol/L    Chloride 105 100 - 111 mmol/L    CO2 29 21 - 32 mmol/L    Anion gap 5 3.0 - 18 mmol/L    Glucose 81 74 - 99 mg/dL    BUN 6 (L) 7.0 - 18 MG/DL    Creatinine 0.66 0.6 - 1.3 MG/DL    BUN/Creatinine ratio 9 (L) 12 - 20      GFR est AA >60 >60 ml/min/1.73m2    GFR est non-AA >60 >60 ml/min/1.73m2    Calcium 8.9 8.5 - 10.1 MG/DL    Bilirubin, total 0.3 0.2 - 1.0 MG/DL    ALT (SGPT) 56 13 - 56 U/L    AST (SGOT) 101 (H) 10 - 38 U/L    Alk.  phosphatase 157 (H) 45 - 117 U/L    Protein, total 8.5 (H) 6.4 - 8.2 g/dL    Albumin 3.5 3.4 - 5.0 g/dL    Globulin 5.0 (H) 2.0 - 4.0 g/dL    A-G Ratio 0.7 (L) 0.8 - 1.7     LIPASE    Collection Time: 09/10/19  8:32 PM   Result Value Ref Range    Lipase 44 (L) 73 - 393 U/L   ETHYL ALCOHOL    Collection Time: 09/10/19  8:32 PM   Result Value Ref Range    ALCOHOL(ETHYL),SERUM 119 (H) 0 - 3 MG/DL   MAGNESIUM    Collection Time: 09/10/19 8:32 PM   Result Value Ref Range    Magnesium 1.9 1.6 - 2.6 mg/dL       Radiologic Studies -   No orders to display     CT Results  (Last 48 hours)    None        CXR Results  (Last 48 hours)    None            Medical Decision Making   I am the first provider for this patient. I reviewed the vital signs, available nursing notes, past medical history, past surgical history, family history and social history. Vital Signs-Reviewed the patient's vital signs. Records Reviewed: Nursing Notes and Old Medical Records    Procedures:  Procedures    Provider Notes (Medical Decision Making): labs stable. Has US in 2d. Exam, VS reassuring. MED RECONCILIATION:  Current Facility-Administered Medications   Medication Dose Route Frequency    metoclopramide HCl (REGLAN) injection 10 mg  10 mg IntraVENous NOW    sodium chloride 0.9 % bolus infusion 1,000 mL  1,000 mL IntraVENous ONCE     Current Outpatient Medications   Medication Sig    ondansetron hcl (ZOFRAN) 4 mg tablet Take 2 Tabs by mouth every eight (8) hours as needed for Nausea.  gabapentin (NEURONTIN) 600 mg tablet Take  by mouth three (3) times daily.  carBAMazepine (TEGRETOL) 200 mg tablet Take 200 mg by mouth.  folic acid (FOLVITE) 1 mg tablet Take 1 mg by mouth.  omeprazole (PRILOSEC) 20 mg capsule Take 20 mg by mouth.  levetiracetam (KEPPRA PO) Take 600 mg by mouth two (2) times a day.  QUEtiapine (SEROQUEL) 25 mg tablet Take 100 mg by mouth nightly as needed.  traZODone (DESYREL) 100 mg tablet Take 100 mg by mouth nightly.  hydrOXYzine HCl (ATARAX) 50 mg tablet Take 50 mg by mouth four (4) times daily. Disposition:  home    DISCHARGE NOTE:   9:37 PM    Pt has been reexamined. Patient has no new complaints, changes, or physical findings. Care plan outlined and precautions discussed. Results of labs were reviewed with the patient. All medications were reviewed with the patient; will d/c home with zofran.  All of pt's questions and concerns were addressed. Patient was instructed and agrees to follow up with urology, as well as to return to the ED upon further deterioration. Patient is ready to go home. Follow-up Information     Follow up With Specialties Details Why Contact Info    Arnulfo Garcia MD Urology Schedule an appointment as soon as possible for a visit  [address] [id number] [place] [facility] EMERGENCY DEPT Emergency Medicine  If symptoms worsen return immediately [address]  [phone]          Current Discharge Medication List      START taking these medications    Details   ondansetron hcl (ZOFRAN) 4 mg tablet Take 2 Tabs by mouth every eight (8) hours as needed for Nausea. Qty: 12 Tab, Refills: 0                 Diagnosis     Clinical Impression:   1. Alcoholic intoxication without complication ([address]    2.  Flank pain

## 2019-09-12 ENCOUNTER — HOSPITAL ENCOUNTER (OUTPATIENT)
Dept: ULTRASOUND IMAGING | Age: 39
Discharge: HOME | End: 2019-09-12
Attending: UROLOGY
Payer: MEDICAID

## 2019-09-12 DIAGNOSIS — N28.1 COMPLEX RENAL CYST: ICD-10-CM

## 2019-09-12 PROCEDURE — 76770 US EXAM ABDO BACK WALL COMP: CPT

## 2019-09-16 NOTE — PROGRESS NOTES
Please notify pt that renal US showed no concerning findings. Can recheck the right kidney cyst w/ another renal US in 6 months. Thanks.

## 2019-09-18 ENCOUNTER — HOSPITAL ENCOUNTER (OUTPATIENT)
Dept: LAB | Age: 39
Discharge: HOME | End: 2019-09-18

## 2019-09-18 ENCOUNTER — OFFICE VISIT (OUTPATIENT)
Dept: ORTHOPEDIC SURGERY | Age: 39
End: 2019-09-18

## 2019-09-18 VITALS
HEIGHT: 65 IN | WEIGHT: 168.6 LBS | HEART RATE: 78 BPM | DIASTOLIC BLOOD PRESSURE: 76 MMHG | TEMPERATURE: 97.1 F | SYSTOLIC BLOOD PRESSURE: 117 MMHG | RESPIRATION RATE: 16 BRPM | BODY MASS INDEX: 28.09 KG/M2

## 2019-09-18 DIAGNOSIS — M79.671 BILATERAL FOOT PAIN: ICD-10-CM

## 2019-09-18 DIAGNOSIS — M21.612 BILATERAL BUNIONS: Primary | ICD-10-CM

## 2019-09-18 DIAGNOSIS — S93.602A FOOT SPRAIN, LEFT, INITIAL ENCOUNTER: ICD-10-CM

## 2019-09-18 DIAGNOSIS — S90.122A CONTUSION OF FIFTH TOE OF LEFT FOOT, INITIAL ENCOUNTER: ICD-10-CM

## 2019-09-18 DIAGNOSIS — M21.611 BILATERAL BUNIONS: Primary | ICD-10-CM

## 2019-09-18 DIAGNOSIS — M79.672 BILATERAL FOOT PAIN: ICD-10-CM

## 2019-09-18 LAB — XX-LABCORP SPECIMEN COL,LCBCF: NORMAL

## 2019-09-18 PROCEDURE — 99001 SPECIMEN HANDLING PT-LAB: CPT
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
## 2019-09-18 NOTE — PROGRESS NOTES
1. Have you been to the ER, urgent care clinic since your last visit? Hospitalized since your last visit? No    2. Have you seen or consulted any other health care providers outside of the [address] [place] [address] since your last visit? Include any pap smears or colon screening.  No

## 2019-09-18 NOTE — PROGRESS NOTES
AMBULATORY PROGRESS NOTE      Patient: Lawanda Marcus             MRN: [id number]     SSN: [id number] Body mass index is 28.06 kg/m². YOB: 1980     AGE: 34 y.o. SEX: female    PCP: None     IMPRESSION/DIAGNOSIS AND TREATMENT PLAN     DIAGNOSES  1. Bilateral bunions    2. Contusion of fifth toe of left foot, initial encounter    3. Foot sprain, left, initial encounter    4. Bilateral foot pain        Orders Placed This Encounter    AMB POC XRAY, FOOT; COMPLETE, 3+ VIEW    AMB POC XRAY, FOOT; COMPLETE, 3+ VIEW      lAo Cao understands her diagnoses and the proposed plan. So, she has a contusion to the dorsolateral part of her foot. Three views of her left foot, today, AP, lateral, and oblique, weightbearing, show no acute fracture, subluxation, or dislocation. There is a moderate bunion on the left side. On the right side, there is a moderate to severe bunion, malposition of the sesamoids, and a slightly, nonconcentric right great toe first MTP. There is increased SHELLY angle, flat first TMT joint articulation. I had a lengthy discussion with her. Conservative measures and surgical measures were discussed with her regarding bunions. We want to get her left foot pain improved prior to doing any surgery on her right side. Should she require right forefoot surgery, I think she is going to require a Lapidus type procedure and a possible Akin closing wedge osteotomy and distal modified Bauer procedure as well. It will take at least 12 weeks to heal.  She does quite a bit of walking and standing at work. So, I want to get her left foot better prior to doing any surgery on her right side. Additional recommendations are listed as below. Plan:    1) Look into New Balance 928 or Noats shoes. 2) Continue activity modification as directed.       RTO - 3 weeks     HPI AND EXAMINATION     Alo Cao IS A 34 y.o. female who presents to my outpatient office complaining of left foot pain. Ms. Carol Márquez states that three days ago, she fell while outside, which caused her left toes to go underneath of her foot. She reports that she has a h/o seizures and had taken phenergan, then went outside. She notes that she is not supposed to be exposed to sunlight, and when she was, this caused her to pass out. She was seen at  ED for this injury and was instructed to follow up with her PCP. She adds that she also fractured her left foot almost two years ago and was seen by a physician at [address] [place] [address] and placed in a CAM boot. She notes that this physician also recommended she take care of her bilateral bunions, as well. She states that her bunions cause her discomfort, especially when wearing shoes. She notes that she does have a comfortable pair of shoes, however, she states that these shoes are beginning to get worn down. Of note, the patient has h/o peptic ulcer disease and has an appointment with a gastroenterologist today. She also has h/o partial liver removal.     The patient has h/o seizures, ulcers, and partial liver removal.     The patient works at [address] [place] [address] [employer]. Visit Vitals  /76   Pulse 78   Temp 97.1 °F (36.2 °C) (Oral)   Resp 16   Ht 5' 5\" (1.651 m)   Wt 168 lb 9.6 oz (76.5 kg)   BMI 28.06 kg/m²     Appearance: Alert, well appearing and pleasant patient who is in no distress, oriented to person, place/time, and who follows commands. This patient is accompanied in the examination room by her self. Dementia: no dementia  Psychiatric: Affect and mood are appropriate. Patient arrives to office via: without assistive device  HEENT: Head normocephalic & atraumatic.  Both pupils are round, non icteric sclera   Eye: EOM are intact and sclera are clear    Neck: ROM WNL and JVD neck is not present     Hearings Intact, does not require hearing aid device  Respiratory: Breathing is unlabored without accessory chest muscle use  Cardiovascular/Peripheral Vascular: Normal Pulses to each foot    ANKLE/FOOT left    Gait: Normal  Tenderness: Mild tenderness to the cuboid    Mild tenderness to the #3,4,5 metatarsals, middle 1/3 region    Mild tenderness to the bunion   Cutaneous: WNL. Joint Motion: WNL. Joint / Tendon Stability: No Ankle or Subtalar instability or joint laxity. No peroneal sublux ability or dislocation  Alignment: Moderate bunion deformity. Neuro Motor/Sensory: NL/NL. Vascular: NL foot/ankle pulses. Lymphatics: No extremity lymphedema, No calf swelling, no tenderness to calf muscles. ANKLE/FOOT right    Tenderness: Mild tenderness to the bunion. Cutaneous: WNL. Joint Motion: WNL. Joint / Tendon Stability: No Ankle or Subtalar instability or joint laxity. No peroneal sublux ability or dislocation  Alignment: Moderate bunion deformity. Neuro Motor/Sensory: NL/NL. Vascular: NL foot/ankle pulses. Lymphatics: No extremity lymphedema, No calf swelling, no tenderness to calf muscles. CHART REVIEW     Past Medical History:   Diagnosis Date    Endometriosis     Ill-defined condition     meningitis    Liver mass 2016    small liver mass found per pt during last hospital stay    Pancreatitis     Renal cyst     Seizures ([facility] [address]     due to alcohol withdrawal    Severe dysplasia of cervix      Current Outpatient Medications   Medication Sig    promethazine (PHENERGAN) 25 mg tablet Take 1 Tab by mouth every six (6) hours as needed for Nausea. Caution: This medication may make you drowsy. Avoid driving while under the influence of this medication.  gabapentin (NEURONTIN) 600 mg tablet Take  by mouth three (3) times daily.  carBAMazepine (TEGRETOL) 200 mg tablet Take 200 mg by mouth.  folic acid (FOLVITE) 1 mg tablet Take 1 mg by mouth.  omeprazole (PRILOSEC) 20 mg capsule Take 20 mg by mouth.  levetiracetam (KEPPRA PO) Take 600 mg by mouth two (2) times a day.     QUEtiapine (SEROQUEL) 25 mg tablet Take 100 mg by mouth nightly as needed.  traZODone (DESYREL) 100 mg tablet Take 100 mg by mouth nightly.  hydrOXYzine HCl (ATARAX) 50 mg tablet Take 50 mg by mouth four (4) times daily. No current facility-administered medications for this visit. Allergies   Allergen Reactions    Seafood Anaphylaxis     Pt is ok with iodine contrast. Patient is allergic to shellfish.  Codeine Hives    Penicillins Hives     Past Surgical History:   Procedure Laterality Date    COLPOSCOPY      x 2    HX CHOLECYSTECTOMY      HX LEEP PROCEDURE      LIVER SURGERY PROCEDURE UNLISTED  2004    \"Half of liver removed\"      Social History     Occupational History    Not on file   Tobacco Use    Smoking status: Current Every Day Smoker     Packs/day: 1.00     Years: 22.00     Pack years: 22.00    Smokeless tobacco: Never Used   Substance and Sexual Activity    Alcohol use: Yes     Comment: Alot.  Drug use: Yes     Types: Marijuana     Comment: Last used 2 1/2 weeks ago.  Sexual activity: Yes     Partners: Male     Birth control/protection: None     Family History   Problem Relation Age of Onset    Cancer Mother         Cervical and uterine cancer at age 39    Hypertension Mother     COPD Mother     Hypertension Father     Kidney Disease Maternal Aunt     Kidney Disease Maternal Uncle     Hypertension Maternal Grandmother     Other Other         Gallbladder problems and kidnsy stones problems on mom side        REVIEW OF SYSTEMS : 9/18/2019  ALL BELOW ARE Negative except : SEE HPI      REVIEW OF SYSTEMS : 9/18/2019  ALL BELOW ARE Negative except : SEE HPI     General: Negative for fever and chills. No unexpected change in weight. Denies fatigue. No change in appetite. Dermatologic: Negative for rash or itching, dry skin, hair changes, rash or skin lesion changes  HEENT: Negative for congestion, sore throat, neck pain and neck stiffness. No change in vision or hearing.  Hasn't noted any enlarged lymph nodes in the neck. Cardiovascular:  Negative for chest pain and palpitations. Has not noted pedal edema. Peripheral Vascular: No calf pain, vascular vein tenderness to calf pain           No calf throbbing, posterior knee throbbing pain   Respiratory: Negative for cough, colds, sinus, hemoptysis, shortness of breath and wheezing. Gastrointestinal: Negative for nausea and vomiting, rectal bleeding, coffee ground emesis, abdominal pain, diarrhea and constipation. Genitourinary: Negative for dysuria, frequency urgency, or burning on micturition. No flank pain, no foul smelling urine, no difficulty with initiating urination. Hematological: Negative for bleeding or easy bruising. Musculoskeletal: Negative  for arthralgias, back pain or neck pain. Neurological: Negative for dizziness, seizures or syncopal episodes. Denies headaches. Endocrine: Denies excessive thirst.  No heat/cold intolerance. Psychiatric: Negative for depression or insomnia. DIAGNOSTIC IMAGING     No notes on file    Please see above section of this report. I have personally reviewed the results of the above study. The interpretation of this study is my professional opinion. Written by Kami Chandler, as dictated by Dr. Fransisco Fortune. I, Dr. Fransisco Fortune, confirm that all documentation is accurate.

## 2019-09-18 NOTE — PATIENT INSTRUCTIONS
Look into New Balance 928 or Noats shoes. [employer]  Address: [address] [place] [address] [facility] [address] [id number] [facility], [place], [facility] [address]  Phone: [phone]      Bunions: Care Instructions  Your Care Instructions    A bunion is a bump on the outside of the joint at the bottom of your big toe. It can cause pain and swelling in the toe. A bunion forms when bone or tissue around the joint becomes swollen from too much pressure. You also can have a bunionette, or tailor's bunion, which forms on the joint of the little toe. Sometimes, a bunion on the big toe turns the toe in toward the second toe. This is called displacement. It can lead to problems with the other toes. You can get a bunion from having an unusual walking style, having flatfeet, or wearing tight-fitting shoes. You can treat most bunions at home with a few simple steps. If you have a lot of pain, your doctor may inject medicine into the bunion to reduce swelling for a while. If you still have pain, you may need to have surgery. Follow-up care is a key part of your treatment and safety. Be sure to make and go to all appointments, and call your doctor if you are having problems. It's also a good idea to know your test results and keep a list of the medicines you take. How can you care for yourself at home? · Ask your doctor if you can take an over-the-counter pain medicine, such as acetaminophen (Tylenol), ibuprofen (Advil, Motrin), or naproxen (Aleve). Be safe with medicines. Read and follow all instructions on the label. · Wear shoes that have a wide and deep space for the toes. Also, wear shoes that have low or flat heels and good arch supports. Do not wear tight, narrow, or high-heeled shoes. · Try bunion pads, arch supports, toe spacers, or shoe inserts. They can help shift your weight when you walk to take pressure off your big toe. · Put moleskin or another type of cushion on or around the bunion to keep it from rubbing against your shoe.   · Put ice or a cold pack on the area for 10 to 20 minutes at a time as needed. Put a thin cloth between the ice and your skin. · Prop up your foot on a pillow when you ice your toe or anytime you sit or lie down. Try to keep it above the level of your heart. This will help reduce swelling. When should you call for help? Call your doctor now or seek immediate medical care if:    · You have severe pain.     · Your toe is cool or pale or changes color.     · You have tingling, weakness, or numbness in the toe.    Watch closely for changes in your health, and be sure to contact your doctor if:    · Pain and swelling get worse.     · You do not get better as expected. Where can you learn more? Go to http://willy-yenny.info/. Enter H210 in the search box to learn more about \"Bunions: Care Instructions. \"  Current as of: September 20, 2018  Content Version: 12.1  © 3899-8204 [employer]. Care instructions adapted under license by [employer] (which disclaims liability or warranty for this information). If you have questions about a medical condition or this instruction, always ask your healthcare professional. [address] any warranty or liability for your use of this information.

## 2019-09-28 ENCOUNTER — HOSPITAL ENCOUNTER (OUTPATIENT)
Dept: LAB | Age: 39
Discharge: HOME | End: 2019-09-28

## 2019-09-28 LAB — XX-LABCORP SPECIMEN COL,LCBCF: NORMAL

## 2019-09-28 PROCEDURE — 99001 SPECIMEN HANDLING PT-LAB: CPT

## 2019-10-05 ENCOUNTER — ANESTHESIA EVENT (OUTPATIENT)
Dept: ENDOSCOPY | Age: 39
End: 2019-10-05
Payer: MEDICAID

## 2019-10-07 ENCOUNTER — ANESTHESIA (OUTPATIENT)
Dept: ENDOSCOPY | Age: 39
End: 2019-10-07
Payer: MEDICAID

## 2019-10-07 ENCOUNTER — HOSPITAL ENCOUNTER (OUTPATIENT)
Age: 39
Setting detail: OUTPATIENT SURGERY
Discharge: HOME | End: 2019-10-07
Attending: INTERNAL MEDICINE | Admitting: INTERNAL MEDICINE
Payer: MEDICAID

## 2019-10-07 VITALS
RESPIRATION RATE: 20 BRPM | OXYGEN SATURATION: 100 % | WEIGHT: 170 LBS | DIASTOLIC BLOOD PRESSURE: 67 MMHG | BODY MASS INDEX: 28.32 KG/M2 | TEMPERATURE: 97.9 F | HEIGHT: 65 IN | SYSTOLIC BLOOD PRESSURE: 112 MMHG | HEART RATE: 75 BPM

## 2019-10-07 LAB
AMPHET UR QL SCN: NEGATIVE
BARBITURATES UR QL SCN: NEGATIVE
BENZODIAZ UR QL: NEGATIVE
CANNABINOIDS UR QL SCN: NEGATIVE
COCAINE UR QL SCN: NEGATIVE
HCG UR QL: NEGATIVE
HDSCOM,HDSCOM: NORMAL
METHADONE UR QL: NEGATIVE
OPIATES UR QL: NEGATIVE
PCP UR QL: NEGATIVE

## 2019-10-07 PROCEDURE — 80307 DRUG TEST PRSMV CHEM ANLYZR: CPT

## 2019-10-07 PROCEDURE — 88305 TISSUE EXAM BY PATHOLOGIST: CPT

## 2019-10-07 PROCEDURE — 74011250636 HC RX REV CODE- 250/636: Performed by: NURSE ANESTHETIST, CERTIFIED REGISTERED

## 2019-10-07 PROCEDURE — 81025 URINE PREGNANCY TEST: CPT

## 2019-10-07 PROCEDURE — 77030019988 HC FCPS ENDOSC DISP BSC -B: Performed by: INTERNAL MEDICINE

## 2019-10-07 PROCEDURE — 74011250636 HC RX REV CODE- 250/636

## 2019-10-07 PROCEDURE — 76040000019: Performed by: INTERNAL MEDICINE

## 2019-10-07 PROCEDURE — 77030008565 HC TBNG SUC IRR ERBE -B: Performed by: INTERNAL MEDICINE

## 2019-10-07 PROCEDURE — 76060000031 HC ANESTHESIA FIRST 0.5 HR: Performed by: INTERNAL MEDICINE

## 2019-10-07 PROCEDURE — 74011000250 HC RX REV CODE- 250

## 2019-10-07 PROCEDURE — 77030018846 HC SOL IRR STRL H20 ICUM -A: Performed by: INTERNAL MEDICINE

## 2019-10-07 RX ORDER — PROPOFOL 10 MG/ML
INJECTION, EMULSION INTRAVENOUS AS NEEDED
Status: DISCONTINUED | OUTPATIENT
Start: 2019-10-07 | End: 2019-10-07 | Stop reason: HOSPADM

## 2019-10-07 RX ORDER — SODIUM CHLORIDE, SODIUM LACTATE, POTASSIUM CHLORIDE, CALCIUM CHLORIDE 600; 310; 30; 20 MG/100ML; MG/100ML; MG/100ML; MG/100ML
50 INJECTION, SOLUTION INTRAVENOUS CONTINUOUS
Status: DISCONTINUED | OUTPATIENT
Start: 2019-10-07 | End: 2019-10-07 | Stop reason: HOSPADM

## 2019-10-07 RX ORDER — SODIUM CHLORIDE 0.9 % (FLUSH) 0.9 %
5-40 SYRINGE (ML) INJECTION AS NEEDED
Status: DISCONTINUED | OUTPATIENT
Start: 2019-10-07 | End: 2019-10-07 | Stop reason: HOSPADM

## 2019-10-07 RX ORDER — FAMOTIDINE 10 MG/ML
20 INJECTION INTRAVENOUS ONCE
Status: COMPLETED | OUTPATIENT
Start: 2019-10-07 | End: 2019-10-07

## 2019-10-07 RX ORDER — DEXTROMETHORPHAN/PSEUDOEPHED 2.5-7.5/.8
1.2 DROPS ORAL
Status: CANCELLED | OUTPATIENT
Start: 2019-10-07

## 2019-10-07 RX ORDER — SODIUM CHLORIDE 0.9 % (FLUSH) 0.9 %
5-40 SYRINGE (ML) INJECTION EVERY 8 HOURS
Status: DISCONTINUED | OUTPATIENT
Start: 2019-10-07 | End: 2019-10-07 | Stop reason: HOSPADM

## 2019-10-07 RX ORDER — LIDOCAINE HYDROCHLORIDE 10 MG/ML
0.1 INJECTION, SOLUTION EPIDURAL; INFILTRATION; INTRACAUDAL; PERINEURAL AS NEEDED
Status: DISCONTINUED | OUTPATIENT
Start: 2019-10-07 | End: 2019-10-07 | Stop reason: HOSPADM

## 2019-10-07 RX ORDER — SODIUM CHLORIDE 0.9 % (FLUSH) 0.9 %
5-40 SYRINGE (ML) INJECTION AS NEEDED
Status: CANCELLED | OUTPATIENT
Start: 2019-10-07

## 2019-10-07 RX ORDER — SODIUM CHLORIDE 0.9 % (FLUSH) 0.9 %
5-40 SYRINGE (ML) INJECTION EVERY 8 HOURS
Status: CANCELLED | OUTPATIENT
Start: 2019-10-07

## 2019-10-07 RX ORDER — MIDAZOLAM HYDROCHLORIDE 1 MG/ML
INJECTION, SOLUTION INTRAMUSCULAR; INTRAVENOUS AS NEEDED
Status: DISCONTINUED | OUTPATIENT
Start: 2019-10-07 | End: 2019-10-07 | Stop reason: HOSPADM

## 2019-10-07 RX ORDER — ATROPINE SULFATE 0.1 MG/ML
0.5 INJECTION INTRAVENOUS
Status: CANCELLED | OUTPATIENT
Start: 2019-10-07 | End: 2019-10-08

## 2019-10-07 RX ORDER — ONDANSETRON 2 MG/ML
INJECTION INTRAMUSCULAR; INTRAVENOUS AS NEEDED
Status: DISCONTINUED | OUTPATIENT
Start: 2019-10-07 | End: 2019-10-07 | Stop reason: HOSPADM

## 2019-10-07 RX ORDER — FLUMAZENIL 0.1 MG/ML
0.2 INJECTION INTRAVENOUS
Status: CANCELLED | OUTPATIENT
Start: 2019-10-07 | End: 2019-10-07

## 2019-10-07 RX ORDER — EPINEPHRINE 0.1 MG/ML
1 INJECTION INTRACARDIAC; INTRAVENOUS
Status: CANCELLED | OUTPATIENT
Start: 2019-10-07 | End: 2019-10-08

## 2019-10-07 RX ORDER — SODIUM CHLORIDE, SODIUM LACTATE, POTASSIUM CHLORIDE, CALCIUM CHLORIDE 600; 310; 30; 20 MG/100ML; MG/100ML; MG/100ML; MG/100ML
75 INJECTION, SOLUTION INTRAVENOUS CONTINUOUS
Status: DISCONTINUED | OUTPATIENT
Start: 2019-10-07 | End: 2019-10-07 | Stop reason: HOSPADM

## 2019-10-07 RX ORDER — LIDOCAINE HYDROCHLORIDE 20 MG/ML
INJECTION, SOLUTION EPIDURAL; INFILTRATION; INTRACAUDAL; PERINEURAL AS NEEDED
Status: DISCONTINUED | OUTPATIENT
Start: 2019-10-07 | End: 2019-10-07 | Stop reason: HOSPADM

## 2019-10-07 RX ORDER — NALOXONE HYDROCHLORIDE 0.4 MG/ML
0.4 INJECTION, SOLUTION INTRAMUSCULAR; INTRAVENOUS; SUBCUTANEOUS
Status: CANCELLED | OUTPATIENT
Start: 2019-10-07 | End: 2019-10-07

## 2019-10-07 RX ADMIN — PROPOFOL 50 MG: 10 INJECTION, EMULSION INTRAVENOUS at 07:52

## 2019-10-07 RX ADMIN — SODIUM CHLORIDE, SODIUM LACTATE, POTASSIUM CHLORIDE, AND CALCIUM CHLORIDE 75 ML/HR: 600; 310; 30; 20 INJECTION, SOLUTION INTRAVENOUS at 07:26

## 2019-10-07 RX ADMIN — MIDAZOLAM HYDROCHLORIDE 2 MG: 1 INJECTION, SOLUTION INTRAMUSCULAR; INTRAVENOUS at 07:50

## 2019-10-07 RX ADMIN — PROPOFOL 20 MG: 10 INJECTION, EMULSION INTRAVENOUS at 07:55

## 2019-10-07 RX ADMIN — LIDOCAINE HYDROCHLORIDE 40 MG: 20 INJECTION, SOLUTION EPIDURAL; INFILTRATION; INTRACAUDAL; PERINEURAL at 07:52

## 2019-10-07 RX ADMIN — FAMOTIDINE 20 MG: 10 INJECTION, SOLUTION INTRAVENOUS at 07:24

## 2019-10-07 RX ADMIN — PROPOFOL 20 MG: 10 INJECTION, EMULSION INTRAVENOUS at 07:54

## 2019-10-07 RX ADMIN — ONDANSETRON 4 MG: 2 INJECTION INTRAMUSCULAR; INTRAVENOUS at 07:53

## 2019-10-07 NOTE — PROCEDURES
[email or web address]  [address] [place], [address]      Brief Procedure Note    De Vincent  1980  [id number]    Date of Procedure: 10/7/2019    Preoperative diagnosis: 783.21 - R63.4,  Weight loss  787.01 - R11.2,  Nausea and vomiting  305.01 - F10.10,  Persistent alcohol abuse  530.81 - K21.9,  Gastroesophageal reflux disease    Postoperative diagnosis: small amount of residual food in stomach; gastric bx's r/o h pylori    Type of Anesthesia: MAC (Monitored anesthesia care)    Description of findings: same as post op dx    Procedure: Procedure(s):  UPPER ENDOSCOPYwith bx's    :  Dr. Ernesto Astorga MD    Assistant(s): Endoscopy Technician-1: Symone Wan  Endoscopy RN-1: Dionicio Rodriges RN; Liz Mtahis    EBL:None    Specimens:   ID Type Source Tests Collected by Time Destination   1 : gastric bx's  Preservative Gastric  Joby Chester MD 10/7/2019 2486 Pathology       Findings: See printed and scanned procedure note    Complications: None    Dr. Ernesto Astorga MD  10/7/2019  7:58 AM

## 2019-10-07 NOTE — ANESTHESIA POSTPROCEDURE EVALUATION
Procedure(s):  UPPER ENDOSCOPYwith bx's. MAC    Anesthesia Post Evaluation      Multimodal analgesia: multimodal analgesia used between 6 hours prior to anesthesia start to PACU discharge  Patient location during evaluation: bedside  Patient participation: complete - patient participated  Level of consciousness: awake  Pain score: 0  Pain management: adequate  Airway patency: patent  Anesthetic complications: no  Cardiovascular status: stable  Respiratory status: acceptable  Hydration status: acceptable  Post anesthesia nausea and vomiting:  none      Vitals Value Taken Time   BP 98/62 10/7/2019  8:22 AM   Temp 36.6 °C (97.9 °F) 10/7/2019  8:03 AM   Pulse 81 10/7/2019  8:23 AM   Resp 13 10/7/2019  8:23 AM   SpO2 100 % 10/7/2019  8:23 AM   Vitals shown include unvalidated device data.

## 2019-10-07 NOTE — ANESTHESIA PREPROCEDURE EVALUATION
Relevant Problems   No relevant active problems       Anesthetic History   No history of anesthetic complications            Review of Systems / Medical History  Patient summary reviewed and pertinent labs reviewed    Pulmonary          Smoker         Neuro/Psych     seizures        Comments: Related to ETOH withdrawal/abuse Cardiovascular  Within defined limits                Exercise tolerance: >4 METS     GI/Hepatic/Renal           PUD and liver disease     Endo/Other  Within defined limits           Other Findings              Physical Exam    Airway  Mallampati: II  TM Distance: 4 - 6 cm  Neck ROM: normal range of motion   Mouth opening: Normal     Cardiovascular  Regular rate and rhythm,  S1 and S2 normal,  no murmur, click, rub, or gallop  Rhythm: regular  Rate: normal         Dental    Dentition: Lower dentition intact, Upper dentition intact, Lower braces and Upper braces     Pulmonary  Breath sounds clear to auscultation               Abdominal  GI exam deferred       Other Findings            Anesthetic Plan    ASA: 3  Anesthesia type: MAC          Induction: Intravenous  Anesthetic plan and risks discussed with: Patient

## 2019-10-07 NOTE — H&P
History and Physical reviewed; I have examined the patient and there are no pertinent changes. Della Payton MD, MD   7:30 AM 10/7/2019  [facility], [address] [place] [address]  [email or web address]

## 2019-10-07 NOTE — DISCHARGE INSTRUCTIONS
Patient Education        Upper GI Endoscopy: What to Expect at Home  Your Recovery  After you have an endoscopy, you will stay at the hospital or clinic for 1 to 2 hours. This will allow the medicine to wear off. You will be able to go home after your doctor or nurse checks to make sure you are not having any problems. You may have to stay overnight if you had treatment during the test. You may have a sore throat for a day or two after the test.  This care sheet gives you a general idea about what to expect after the test.  How can you care for yourself at home? Activity  · Rest as much as you need to after you go home. · You should be able to go back to your usual activities the day after the test.  Diet  · Follow your doctor's directions for eating after the test.  · Drink plenty of fluids (unless your doctor has told you not to). Medications  · If you have a sore throat the day after the test, use an over-the-counter spray to numb your throat. Follow-up care is a key part of your treatment and safety. Be sure to make and go to all appointments, and call your doctor if you are having problems. It's also a good idea to know your test results and keep a list of the medicines you take. When should you call for help? Call 911 anytime you think you may need emergency care. For example, call if:    · You passed out (loses consciousness).     · You have trouble breathing.     · You pass maroon or bloody stools.    Call your doctor now or seek immediate medical care if:    · You have pain that does not get better after your take pain medicine.     · You have new or worse belly pain.     · You have blood in your stools.     · You are sick to your stomach and cannot keep fluids down.     · You have a fever.     · You cannot pass stools or gas.    Watch closely for changes in your health, and be sure to contact your doctor if:    · Your throat still hurts after a day or two.     · You do not get better as expected. Where can you learn more? Go to http://willy-yenny.info/. Enter [phone] in the search box to learn more about \"Upper GI Endoscopy: What to Expect at Home. \"  Current as of: November 7, 2018  Content Version: 12.2  © 6740-0723 [employer]. Care instructions adapted under license by [employer] (which disclaims liability or warranty for this information). If you have questions about a medical condition or this instruction, always ask your healthcare professional. [address] any warranty or liability for your use of this information. DISCHARGE SUMMARY from Nurse    PATIENT INSTRUCTIONS:    After general anesthesia or intravenous sedation, for 24 hours or while taking prescription Narcotics:  · Limit your activities  · Do not drive and operate hazardous machinery  · Do not make important personal or business decisions  · Do  not drink alcoholic beverages  · If you have not urinated within 8 hours after discharge, please contact your surgeon on call. Report the following to your surgeon:  · Excessive pain, swelling, redness or odor of or around the surgical area  · Temperature over 100.5  · Nausea and vomiting lasting longer than 4 hours or if unable to take medications  · Any signs of decreased circulation or nerve impairment to extremity: change in color, persistent  numbness, tingling, coldness or increase pain  · Any questions    What to do at Home:  Recommended activity: {discharge activity:73308}, No Drinking or Driving for 24 hours    *  Please give a list of your current medications to your Primary Care Provider. *  Please update this list whenever your medications are discontinued, doses are      changed, or new medications (including over-the-counter products) are added. *  Please carry medication information at all times in case of emergency situations.     These are general instructions for a healthy lifestyle:    No smoking/ No tobacco products/ Avoid exposure to second hand smoke  Surgeon General's Warning:  Quitting smoking now greatly reduces serious risk to your health. Obesity, smoking, and sedentary lifestyle greatly increases your risk for illness    A healthy diet, regular physical exercise & weight monitoring are important for maintaining a healthy lifestyle    You may be retaining fluid if you have a history of heart failure or if you experience any of the following symptoms:  Weight gain of 3 pounds or more overnight or 5 pounds in a week, increased swelling in our hands or feet or shortness of breath while lying flat in bed. Please call your doctor as soon as you notice any of these symptoms; do not wait until your next office visit. The discharge information has been reviewed with the {PATIENT PARENT GUARDIAN:72199}. The {PATIENT PARENT GUARDIAN:30028} verbalized understanding. Discharge medications reviewed with the {Dishcarge meds reviewed VCEZ:29103} and appropriate educational materials and side effects teaching were provided.   ___________________________________________________________________________________________________________________________________

## 2019-10-09 ENCOUNTER — OFFICE VISIT (OUTPATIENT)
Dept: ORTHOPEDIC SURGERY | Age: 39
End: 2019-10-09

## 2019-10-09 VITALS
DIASTOLIC BLOOD PRESSURE: 81 MMHG | TEMPERATURE: 96.5 F | SYSTOLIC BLOOD PRESSURE: 112 MMHG | RESPIRATION RATE: 16 BRPM | WEIGHT: 179.6 LBS | BODY MASS INDEX: 29.92 KG/M2 | HEIGHT: 65 IN | HEART RATE: 77 BPM

## 2019-10-09 DIAGNOSIS — M21.611 BILATERAL BUNIONS: Primary | ICD-10-CM

## 2019-10-09 DIAGNOSIS — M21.612 BILATERAL BUNIONS: Primary | ICD-10-CM

## 2019-10-09 NOTE — PROGRESS NOTES
1. Have you been to the ER, urgent care clinic since your last visit? Hospitalized since your last visit? No    2. Have you seen or consulted any other health care providers outside of the [address] [place] [address] since your last visit? Include any pap smears or colon screening.  No

## 2019-10-09 NOTE — PROGRESS NOTES
AMBULATORY PROGRESS NOTE      Patient: Arely Casanova             MRN: [id number]     SSN: [id number] Body mass index is 29.89 kg/m². YOB: 1980     AGE: 34 y.o. SEX: female    PCP: Kole Sullivan DO     IMPRESSION/DIAGNOSIS AND TREATMENT PLAN     DIAGNOSES  1. Bilateral bunions        No orders of the defined types were placed in this encounter. Alo Craven understands her diagnoses and the proposed plan. The patient is here for followup being treated for a severe bunion deformity despite proper wide shoes. She is still having pain and discomfort to the right great toe first MTP. She has a moderate to severe bunion deformity with some pronation of the right great toe and abutment of the right number one and number two toes. In this individual recommendation is for a Lapidus type procedure. This will, I think, best allow me to improve her alignment, as she does have increased mobility to the first TMT. I explained to her that the risks include but are not limited to bleeding, infection, DVT, PE, death, RSD, nick-incisional numbness, subcutaneous hematoma, post-surgical scar, possible delayed healing and nonhealing, possible neuritis, possible neuroma, possible recurrence of the bunion, and possible transverse metatarsalgia. She voices the understanding of the risks of surgery, and the informed decision she would like to make is for surgery and to pursue surgery. We will have her talk to and meet Asif Plunkett, my surgery scheduler to get things scheduled at this patient's convenience. Plan:    1) Contact Ner[facility] for surgical scheduling. 3) Continue activity modification as directed. RTO - after contacting Asif Plunkett     HPI AND EXAMINATION     Alo Craven IS A 34 y.o. female who presents to my outpatient office for follow up of bilateral bunions.  At the last visit, I instructed the patient to continue activity modification as directed and to look into New Balance 928 or Noats shoes. Since we saw her last, Ms. Ar Calderon states that she is still experiencing pain in her right great toe, over her bunion. She reports that she has looked into new shoes, which have not helped with her pain. Possible surgical interventions have been discussed with the patient. The patient notes that she cannot drive due to seizures. 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 She states that she also has a complex renal cyst, as well. She reports that all of her medical records are in the [employer] system. The patient states that she had an endoscopy on Monday and had blood work drawn for this. The patient has h/o seizures, ulcers, and partial liver removal.     The patient works at [employer]. Visit Vitals  /81   Pulse 77   Temp 96.5 °F (35.8 °C) (Oral)   Resp 16   Ht 5' 5\" (1.651 m)   Wt 179 lb 9.6 oz (81.5 kg)   BMI 29.89 kg/m²     Appearance: Alert, well appearing and pleasant patient who is in no distress, oriented to person, place/time, and who follows commands. This patient is accompanied in the examination room by her self. Dementia: no dementia  Psychiatric: Affect and mood are appropriate. Patient arrives to office via: without assistive device  HEENT: Head normocephalic & atraumatic. Both pupils are round, non icteric sclera   Eye: EOM are intact and sclera are clear    Neck: ROM WNL and JVD neck is not present     Hearings Intact, does not require hearing aid device  Respiratory: Breathing is unlabored without accessory chest muscle use  Cardiovascular/Peripheral Vascular: Normal Pulses to each foot    ANKLE/FOOT left    Gait: Normal  Tenderness: Mild tenderness to the cuboid    Mild tenderness to the #3,4,5 metatarsals, middle 1/3 region    Mild tenderness to the bunion   Cutaneous: WNL. Joint Motion: WNL. Joint / Tendon Stability: No Ankle or Subtalar instability or joint laxity.                        No peroneal sublux ability or dislocation  Alignment: Moderate bunion deformity. Neuro Motor/Sensory: NL/NL. Vascular: NL foot/ankle pulses. Lymphatics: No extremity lymphedema, No calf swelling, no tenderness to calf muscles. ANKLE/FOOT right    Tenderness: Mild tenderness to the bunion. Cutaneous: WNL. Joint Motion: WNL. Joint / Tendon Stability: No Ankle or Subtalar instability or joint laxity. No peroneal sublux ability or dislocation  Alignment: Moderate to severe bunion deformity. Mobile bunion  Neuro Motor/Sensory: NL/NL. Vascular: NL foot/ankle pulses. Lymphatics: No extremity lymphedema, No calf swelling, no tenderness to calf muscles. CHART REVIEW     Past Medical History:   Diagnosis Date    Adverse effect of anesthesia     Endometriosis     Ill-defined condition     meningitis    Liver mass 2016    small liver mass found per pt during last hospital stay    Pancreatitis     Renal cyst     Seizures ([facility] [address]     due to alcohol withdrawal    Severe dysplasia of cervix     Urine frequency      Current Outpatient Medications   Medication Sig    esomeprazole (NEXIUM) 40 mg capsule TAKE 1 CAPSULE BY MOUTH ONCE DAILY IN THE MORNING 30 MINUTES BEFORE BREAKFAST    lamoTRIgine (LAMICTAL) 25 mg tablet TAKE 1 TABLET BY MOUTH EVERY DAY AT BEDTIME FOR 2 WEEKS    mirtazapine (REMERON) 15 mg tablet TAKE 1 TABLET BY MOUTH EVERY DAY AT BEDTIME    naltrexone (DEPADE) 50 mg tablet TAKE 1 TABLET BY MOUTH ONCE DAILY    propranolol (INDERAL) 20 mg tablet Take  by mouth three (3) times daily.  promethazine (PHENERGAN) 25 mg tablet Take 1 Tab by mouth every six (6) hours as needed for Nausea. Caution: This medication may make you drowsy. Avoid driving while under the influence of this medication.  gabapentin (NEURONTIN) 600 mg tablet Take  by mouth three (3) times daily.  carBAMazepine (TEGRETOL) 200 mg tablet Take 200 mg by mouth.     folic acid (FOLVITE) 1 mg tablet Take 1 mg by mouth.    omeprazole (PRILOSEC) 20 mg capsule Take 20 mg by mouth.  levetiracetam (KEPPRA PO) Take 600 mg by mouth two (2) times a day.  QUEtiapine (SEROQUEL) 25 mg tablet Take 100 mg by mouth nightly as needed.  traZODone (DESYREL) 100 mg tablet Take 100 mg by mouth nightly.  hydrOXYzine HCl (ATARAX) 50 mg tablet Take 50 mg by mouth four (4) times daily. No current facility-administered medications for this visit. Allergies   Allergen Reactions    Seafood Anaphylaxis     Pt is ok with iodine contrast. Patient is allergic to shellfish.  Shellfish Derived Anaphylaxis    Codeine Hives    Penicillins Hives     Past Surgical History:   Procedure Laterality Date    COLPOSCOPY      x 2    HX CHOLECYSTECTOMY      HX GYN      colposcopy d and c    HX LEEP PROCEDURE      HX OTHER SURGICAL      LIVER SURGERY PROCEDURE UNLISTED  2004    \"Half of liver removed\"      Social History     Occupational History    Not on file   Tobacco Use    Smoking status: Current Every Day Smoker     Packs/day: 1.00     Years: 22.00     Pack years: 22.00    Smokeless tobacco: Never Used   Substance and Sexual Activity    Alcohol use: Yes     Comment: daily liquor moderate amount    Drug use: Yes     Types: Marijuana     Comment: Last used 30 days ago.  Sexual activity: Yes     Partners: Male     Birth control/protection: None     Family History   Problem Relation Age of Onset    Cancer Mother         Cervical and uterine cancer at age 39    Hypertension Mother     COPD Mother     Hypertension Father     Kidney Disease Maternal Aunt     Kidney Disease Maternal Uncle     Hypertension Maternal Grandmother     Other Other         Gallbladder problems and kidnsy stones problems on mom side        REVIEW OF SYSTEMS : 10/9/2019  ALL BELOW ARE Negative except : SEE HPI      REVIEW OF SYSTEMS : 10/9/2019  ALL BELOW ARE Negative except : SEE HPI     General: Negative for fever and chills.  No unexpected change in weight. Denies fatigue. No change in appetite. Dermatologic: Negative for rash or itching, dry skin, hair changes, rash or skin lesion changes  HEENT: Negative for congestion, sore throat, neck pain and neck stiffness. No change in vision or hearing. Hasn't noted any enlarged lymph nodes in the neck. Cardiovascular:  Negative for chest pain and palpitations. Has not noted pedal edema. Peripheral Vascular: No calf pain, vascular vein tenderness to calf pain           No calf throbbing, posterior knee throbbing pain   Respiratory: Negative for cough, colds, sinus, hemoptysis, shortness of breath and wheezing. Gastrointestinal: Negative for nausea and vomiting, rectal bleeding, coffee ground emesis, abdominal pain, diarrhea and constipation. Genitourinary: Negative for dysuria, frequency urgency, or burning on micturition. No flank pain, no foul smelling urine, no difficulty with initiating urination. Hematological: Negative for bleeding or easy bruising. Musculoskeletal: Negative  for arthralgias, back pain or neck pain. Neurological: Negative for dizziness, seizures or syncopal episodes. Denies headaches. Endocrine: Denies excessive thirst.  No heat/cold intolerance. Psychiatric: Negative for depression or insomnia. DIAGNOSTIC IMAGING     No notes on file    Please see above section of this report. I have personally reviewed the results of the above study. The interpretation of this study is my professional opinion. Written by Michel Severin, as dictated by Dr. Tia Gannon. I, Dr. Tia Gannon, confirm that all documentation is accurate.

## 2019-10-10 ENCOUNTER — DOCUMENTATION ONLY (OUTPATIENT)
Dept: ORTHOPEDIC SURGERY | Age: 39
End: 2019-10-10

## 2019-10-10 NOTE — PROGRESS NOTES
Records Request from [facility] was placed in the forms bin on 10-10-19. Please complete and fax back to [phone].

## 2019-10-16 ENCOUNTER — HOSPITAL ENCOUNTER (OUTPATIENT)
Age: 39
Discharge: HOME | End: 2019-10-16
Attending: PHYSICIAN ASSISTANT

## 2019-10-16 DIAGNOSIS — R79.89 ABNORMAL LFTS: ICD-10-CM

## 2019-10-16 DIAGNOSIS — R93.89 ABNORMAL CT SCAN: ICD-10-CM

## 2019-10-18 ENCOUNTER — HOSPITAL ENCOUNTER (OUTPATIENT)
Age: 39
Discharge: HOME | End: 2019-10-18
Attending: PHYSICIAN ASSISTANT

## 2019-10-21 ENCOUNTER — HOSPITAL ENCOUNTER (OUTPATIENT)
Dept: PREADMISSION TESTING | Age: 39
Discharge: HOME | End: 2019-10-21
Payer: MEDICAID

## 2019-10-21 ENCOUNTER — DOCUMENTATION ONLY (OUTPATIENT)
Dept: ORTHOPEDIC SURGERY | Age: 39
End: 2019-10-21

## 2019-10-21 DIAGNOSIS — Z01.818 PREOP EXAMINATION: Primary | ICD-10-CM

## 2019-10-21 DIAGNOSIS — Z01.818 PREOP EXAMINATION: ICD-10-CM

## 2019-10-21 LAB
ALBUMIN SERPL-MCNC: 3.8 G/DL (ref 3.4–5)
ALBUMIN/GLOB SERPL: 0.9 {RATIO} (ref 0.8–1.7)
ALP SERPL-CCNC: 116 U/L (ref 45–117)
ALT SERPL-CCNC: 33 U/L (ref 13–56)
ANION GAP SERPL CALC-SCNC: 6 MMOL/L (ref 3–18)
APPEARANCE UR: CLEAR
AST SERPL-CCNC: 67 U/L (ref 10–38)
ATRIAL RATE: 59 BPM
BASOPHILS # BLD: 0.1 K/UL (ref 0–0.1)
BASOPHILS NFR BLD: 1 % (ref 0–2)
BILIRUB SERPL-MCNC: 0.8 MG/DL (ref 0.2–1)
BILIRUB UR QL: NEGATIVE
BUN SERPL-MCNC: 9 MG/DL (ref 7–18)
BUN/CREAT SERPL: 13 (ref 12–20)
CALCIUM SERPL-MCNC: 8.8 MG/DL (ref 8.5–10.1)
CALCULATED P AXIS, ECG09: 52 DEGREES
CALCULATED R AXIS, ECG10: 34 DEGREES
CALCULATED T AXIS, ECG11: 4 DEGREES
CHLORIDE SERPL-SCNC: 105 MMOL/L (ref 100–111)
CO2 SERPL-SCNC: 29 MMOL/L (ref 21–32)
COLOR UR: YELLOW
CREAT SERPL-MCNC: 0.67 MG/DL (ref 0.6–1.3)
DIAGNOSIS, 93000: NORMAL
DIFFERENTIAL METHOD BLD: ABNORMAL
EOSINOPHIL # BLD: 0.1 K/UL (ref 0–0.4)
EOSINOPHIL NFR BLD: 2 % (ref 0–5)
EPITH CASTS URNS QL MICRO: NORMAL /LPF (ref 0–5)
ERYTHROCYTE [DISTWIDTH] IN BLOOD BY AUTOMATED COUNT: 14.5 % (ref 11.6–14.5)
GLOBULIN SER CALC-MCNC: 4.1 G/DL (ref 2–4)
GLUCOSE SERPL-MCNC: 82 MG/DL (ref 74–99)
GLUCOSE UR STRIP.AUTO-MCNC: NEGATIVE MG/DL
HCT VFR BLD AUTO: 34.2 % (ref 35–45)
HGB BLD-MCNC: 11.7 G/DL (ref 12–16)
HGB UR QL STRIP: ABNORMAL
INR PPP: 1 (ref 0.8–1.2)
KETONES UR QL STRIP.AUTO: NEGATIVE MG/DL
LEUKOCYTE ESTERASE UR QL STRIP.AUTO: NEGATIVE
LYMPHOCYTES # BLD: 1.9 K/UL (ref 0.9–3.6)
LYMPHOCYTES NFR BLD: 36 % (ref 21–52)
MCH RBC QN AUTO: 30.6 PG (ref 24–34)
MCHC RBC AUTO-ENTMCNC: 34.2 G/DL (ref 31–37)
MCV RBC AUTO: 89.5 FL (ref 74–97)
MONOCYTES # BLD: 0.4 K/UL (ref 0.05–1.2)
MONOCYTES NFR BLD: 7 % (ref 3–10)
NEUTS SEG # BLD: 3 K/UL (ref 1.8–8)
NEUTS SEG NFR BLD: 54 % (ref 40–73)
NITRITE UR QL STRIP.AUTO: NEGATIVE
P-R INTERVAL, ECG05: 192 MS
PH UR STRIP: 8.5 [PH] (ref 5–8)
PLATELET # BLD AUTO: 246 K/UL (ref 135–420)
PMV BLD AUTO: 9.8 FL (ref 9.2–11.8)
POTASSIUM SERPL-SCNC: 4.2 MMOL/L (ref 3.5–5.5)
PROT SERPL-MCNC: 7.9 G/DL (ref 6.4–8.2)
PROT UR STRIP-MCNC: NEGATIVE MG/DL
PROTHROMBIN TIME: 13.1 SEC (ref 11.5–15.2)
Q-T INTERVAL, ECG07: 420 MS
QRS DURATION, ECG06: 84 MS
QTC CALCULATION (BEZET), ECG08: 415 MS
RBC # BLD AUTO: 3.82 M/UL (ref 4.2–5.3)
RBC #/AREA URNS HPF: NORMAL /HPF (ref 0–5)
SODIUM SERPL-SCNC: 140 MMOL/L (ref 136–145)
SP GR UR REFRACTOMETRY: 1.01 (ref 1–1.03)
UROBILINOGEN UR QL STRIP.AUTO: 1 EU/DL (ref 0.2–1)
VENTRICULAR RATE, ECG03: 59 BPM
WBC # BLD AUTO: 5.5 K/UL (ref 4.6–13.2)
WBC URNS QL MICRO: NORMAL /HPF (ref 0–4)

## 2019-10-21 PROCEDURE — 36415 COLL VENOUS BLD VENIPUNCTURE: CPT

## 2019-10-21 PROCEDURE — 81001 URINALYSIS AUTO W/SCOPE: CPT

## 2019-10-21 PROCEDURE — 93005 ELECTROCARDIOGRAM TRACING: CPT

## 2019-10-21 PROCEDURE — 85025 COMPLETE CBC W/AUTO DIFF WBC: CPT

## 2019-10-21 PROCEDURE — 80053 COMPREHEN METABOLIC PANEL: CPT

## 2019-10-21 PROCEDURE — 85610 PROTHROMBIN TIME: CPT

## 2019-10-21 NOTE — PROGRESS NOTES
We received Janeye Alpa form to be completed for patient, work status/ restrictions need addressed. Patient is scheduled to have Right Lapidus Fusion, Modified Bauer, Chong Procedure on 11/1/19. What work status or restrictions will patient be on following surgery, and how long will they be in place for.

## 2019-10-21 NOTE — PROGRESS NOTES
Patient will be out of work for 3-4 months, NWB on right foot.          Agnes Eddy PA-C  10/21/2019  4:05 PM

## 2019-10-23 ENCOUNTER — OFFICE VISIT (OUTPATIENT)
Dept: ORTHOPEDIC SURGERY | Age: 39
End: 2019-10-23

## 2019-10-23 VITALS
BODY MASS INDEX: 30.26 KG/M2 | HEIGHT: 65 IN | RESPIRATION RATE: 14 BRPM | WEIGHT: 181.6 LBS | TEMPERATURE: 96.9 F | OXYGEN SATURATION: 100 % | HEART RATE: 68 BPM | SYSTOLIC BLOOD PRESSURE: 120 MMHG | DIASTOLIC BLOOD PRESSURE: 87 MMHG

## 2019-10-23 DIAGNOSIS — M79.672 BILATERAL FOOT PAIN: ICD-10-CM

## 2019-10-23 DIAGNOSIS — Z01.818 PRE-OPERATIVE EXAMINATION: ICD-10-CM

## 2019-10-23 DIAGNOSIS — M21.612 BILATERAL BUNIONS: Primary | ICD-10-CM

## 2019-10-23 DIAGNOSIS — M21.611 BILATERAL BUNIONS: Primary | ICD-10-CM

## 2019-10-23 DIAGNOSIS — M79.671 BILATERAL FOOT PAIN: ICD-10-CM

## 2019-10-23 NOTE — PATIENT INSTRUCTIONS
Dr. Johnny Charles Pre-operative Instructions:      Patient: Gustavo Amos   :  1980     I understand I am to stop taking oral birth control pills, hormonal replacement therapy and all Aspirin, Aspirin containing medications, Non-Steroidal Anti-Inflammatory medications (such as Advil, Aleve, Motrin, Ibuprofen) and or Blood thinner medication such as Coumadin, Plavix, Heparin or others 5 days prior to surgery. I understand I am to STOP taking these Medications 5 days prior to surgery:  I am to get instructions from my prescribing physician. 1. __As listed above_______________________  2. _____________________________________  3. _____________________________________  4. _____________________________________    I understand that if I am taking daily medications for high blood pressure, I can take them the morning of surgery with a small sip of water. I will consult my prescribing physician or call [facility] with specific questions. I also understand that:     I am to report important observations or changes that may occur prior to surgery. If I have any changes in my physical condition, such as a rash, a fever, sore throat, abscess, ulcers, nausea, vomiting, or diarrhea. I am to call the office and I am to consult my primary care physician to assess and treat the problem.  I am not to eat or drink anything after midnight the night before my surgery.  I am not to drink alcoholic beverages 24 hours prior to surgery.  I am not to do any illegal drugs prior to surgery.  I am not to smoke at least 24 hours prior to surgery.  I am able and will shower or bathe before surgery. I will use the Hibiclens solution on my surgical site only. The hibiclens directions are one packet a day starting two days before surgery.  I will remove any nail polish, make-up or jewelry prior to arriving for my surgery.       If I wear glasses, contact lenses or dentures they must be removed prior to going to the operating room.  All body piercing and artifical eye-lashes must be removed prior to surgery     I will not wear any aerosol sprays, perfumes or skin creams.  I am to make arrangements for a family member or friend to accompany me to surgery and take me home after my surgery as I will not be allowed to leave the hospital alone. A cab or bus will not be acceptable. Please make arrange for someone to stay with you for 24 hours after surgery.  Patient has expressed understanding of the diagnosis, treatment and planned surgery        Surgery: What to Expect at [address] [place] [address]  This care sheet gives you a general idea about how long it will take for you to recover from your surgery. But each person recovers at a different pace. How can you care for yourself at home? Activity  · Allow your body to heal. Don't move quickly or lift anything heavy until you are feeling better. · Rest when you feel tired. · Your doctor may give you specific instructions on when you can do your normal activities again, such as driving and going back to work. · Be active. Walking is a good choice. Diet  · You can eat your normal diet when you feel well. If your stomach is upset, try bland, low-fat foods like plain rice, broiled chicken, toast, and yogurt. · If your bowel movements are not regular right after surgery, try to avoid constipation and straining. Drink plenty of water. Your doctor may suggest fiber, a stool softener, or a mild laxative. Medicines  · Your doctor will tell you if and when you can restart your medicines. He or she will also give you instructions about taking any new medicines. · If you take blood thinners, such as warfarin (Coumadin), clopidogrel (Plavix), or aspirin, be sure to talk to your doctor. He or she will tell you if and when to start taking those medicines again. Make sure that you understand exactly what your doctor wants you to do. · Be safe with medicines. Read and follow all instructions on the label. ¨ If the doctor gave you a prescription medicine for pain, take it as prescribed. ¨ If you are not taking a prescription pain medicine, ask your doctor if you can take an over-the-counter medicine. Incision care  · You will have a dressing over the cut (incision). A dressing helps the incision heal and protects it. Your doctor will tell you how to take care of this. · If you have strips of tape on the cut the doctor made, leave the tape on for a week or until it falls off. · If you had stitches, your doctor will tell you when to come back to have them removed. · If you have skin adhesive on the cut, leave it on until it falls off. Skin adhesive is also called liquid stitches. · Change the bandage every day. · Wash the area daily with warm, soapy water, and pat it dry. Don't use hydrogen peroxide or alcohol. They can slow healing. · You may cover the area with a gauze bandage if it oozes fluid or rubs against clothing. · You may shower 24 to 48 hours after surgery. Pat the incision dry. Don't swim or take a bath for the first 2 weeks, or until your doctor tells you it is okay. Follow-up care is a key part of your treatment and safety. Be sure to make and go to all appointments, and call your doctor if you are having problems. It's also a good idea to know your test results and keep a list of the medicines you take. When should you call for help? Call 911 anytime you think you may need emergency care. For example, call if:  · You passed out (lost consciousness). · You have severe trouble breathing. · You have sudden chest pain and shortness of breath, or you cough up blood. Call your doctor now or seek immediate medical care if:  · You have pain that does not get better after you take pain medicine. · You have loose stitches, or your incision comes open. · You are bleeding through your dressing.  A small amount of blood is normal.  · You have signs of infection, such as:  ¨ Increased pain, swelling, warmth, or redness. ¨ Red streaks leading from the incision. ¨ Pus draining from the incision. ¨ A fever. · You have symptoms of a blood clot in your arm or leg (called a deep vein thrombosis). These may include:  ¨ Pain in your calf, back of the knee, thigh, or groin. ¨ Redness and swelling in the arm, leg, or groin. Watch closely for any changes in your health, and be sure to contact your doctor if:  · You do not have a bowel movement after taking a laxative. Where can you learn more? Go to http://willy-yenny.info/  Enter M303 in the search box to learn more about \"Surgery: What to Expect at Home. \"  © 4459-4676 [employer]. Care instructions adapted under license by [employer] (which disclaims liability or warranty for this information). This care instruction is for use with your licensed healthcare professional. If you have questions about a medical condition or this instruction, always ask your healthcare professional. [place] [address] any warranty or liability for your use of this information. Content Version: 48.5.720031; Current as of: November 20, 2015          Acute Pain After Surgery: Care Instructions  Your Care Instructions      It's common to have some pain after surgery. Pain doesn't mean that something is wrong or that the surgery didn't go well. But when the pain is severe, it's important to work with your doctor to manage it. It's also important to be aware of a few facts about pain and pain medicine. · You are the only person who knows what your pain feels like. So be sure to tell your doctor when you are in pain or when the pain changes. Then he or she will know how to adjust your medicines. · Pain is often easier to control right after it starts. So it may be better to take regular doses of pain medicine and not wait until the pain gets bad. · Medicine can help control pain. But this doesn't mean you'll have no pain. Medicine works to keep the pain at a level you can live with. With time, you will feel better. Follow-up care is a key part of your treatment and safety. Be sure to make and go to all appointments, and call your doctor if you are having problems. It's also a good idea to know your test results and keep a list of the medicines you take. How can you care for yourself at home? · Be safe with medicines. Read and follow all instructions on the label. ¨ If the doctor gave you a prescription medicine for pain, take it as prescribed. ¨ If you are not taking a prescription pain medicine, ask your doctor if you can take an over-the-counter medicine. · If you take an over-the-counter pain medicine, such as acetaminophen (Tylenol), ibuprofen (Advil, Motrin), or naproxen (Aleve), read and follow all instructions on the label. · Do not take two or more pain medicines at the same time unless the doctor told you to. · Do not drink alcohol while you are taking pain medicines. · Try to walk each day if your doctor recommends it. Start by walking a little more than you did the day before. Bit by bit, increase the amount you walk. Walking increases blood flow. It also helps prevent pneumonia and constipation. · To prevent constipation from opioid pain medicines:  ¨ Talk to your doctor about a laxative. ¨ Include fruits, vegetables, beans, and whole grains in your diet each day. These foods are high in fiber. ¨ Drink plenty of fluids, enough so that your urine is light yellow or clear like water. Drink water, fruit juice, or other drinks that do not contain caffeine or alcohol. If you have kidney, heart, or liver disease and have to limit fluids, talk with your doctor before you increase the amount of fluids you drink. ¨ Take a fiber supplement, such as Citrucel or Metamucil, every day if needed. Read and follow all instructions on the label.  If you take pain medicine for more than a few days, talk to your doctor before you take fiber. When should you call for help? Call your doctor now or seek immediate medical care if:  ? · Your pain gets worse. ? · Your pain is not controlled by medicine. ? Watch closely for changes in your health, and be sure to contact your doctor if you have any problems. Where can you learn more? Go to http://willy-yenny.info/. Enter (05) 773-207 in the search box to learn more about \"Acute Pain After Surgery: Care Instructions. \"  Current as of: March 20, 2017  Content Version: 11.4  © 3377-5591 [employer]. Care instructions adapted under license by [employer] (which disclaims liability or warranty for this information). If you have questions about a medical condition or this instruction, always ask your healthcare professional. [address] any warranty or liability for your use of this information.

## 2019-10-23 NOTE — H&P
FOOT AND ANKLE HISTORY AND PHYSICAL      Patient: Cherelle Vargas                   MRN: [id number]         SSN: [id number]  YOB: 1980                AGE: 37 y.o. SEX: female    Patient scheduled for:  Right Lapidus procedure, modified Bauer distal soft tissue procedure, possible Akin procedure, possible bone grafting   Date of surgery: 11/1/19   Location of Surgery: [facility]   Surgeon: Natalie Moore MD  ANESTHESIA TYPE:  General, Popliteal block  CONSULTS: None          PRESCRIPTIONS AND/OR ORDERS PROVIDED DURING H&P:    Orders Placed This Encounter    Generic Supply Order     Walker  Knee scooter  Post op shoe  Raised toilet seat    7-DRUG SCREEN + ETHANOL, UR     Standing Status:   Future     Standing Expiration Date:   12/23/2019               HISTORY:     The patient was seen in the office today for a preoperative history and physical for an upcoming above listed surgery. The patient is a pleasant 37 y.o. female who has a history of pain in her right great toe, over her bunion. She has a severe bunion deformity and is still having pain and discomfort to the right great toe first MTP despite proper wide shoes. She has a moderate to severe bunion deformity with some pronation of the right great toe and abutment of the right number one and number two toes. She reports that she has looked into new shoes, which have not helped with her pain. Possible surgical interventions have been discussed with the patient. The patient notes that she cannot drive due to seizures. 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 She states that she also has a complex renal cyst, as well. She reports that all of her medical records are in the [facility] system. The patient states that she had an endoscopy on Monday and had blood work drawn for this.      Due to the current findings, affected activity of daily living and continued pain and discomfort, surgical intervention is indicated. The alternatives, risks, and complications, including but not limited to infection, blood loss, need for blood transfusion, neurovascular damage, nick-incisional numbness, subcutaneous hematoma, bone fracture, anesthetic complications, DVT, PE, death, RSD, postoperative stiffness and pain, possible surgical scar, delayed healing and nonhealing, reflexive sympathetic dystrophy, damage to blood vessels and nerves, need for more surgery, MI, and stroke, have been discussed. The patient understands and wishes to proceed with surgery. The patient has h/o seizures, ulcers, and partial liver removal. Patient reports daily Vodka consumption and has had withdrawal symptoms in the past. She last use Marijuana on her birthday, 10/[birth year]. The patient works at [email or web address].      PAST MEDICAL HISTORY:     Past Medical History:   Diagnosis Date    Adverse effect of anesthesia     Anxiety     Endometriosis     EtOH dependence ([facility] [address]     Patient reports daily Vodka consumption for years with withdrawal symptoms in the past    GERD (gastroesophageal reflux disease)     Ill-defined condition     meningitis    Liver mass 2016    small liver mass found per pt during last hospital stay    Orthodontics     Pancreatitis     Peptic ulcer     Renal cyst     Routine lab draw     Patient reports that vein finder may be required    Seizures ([facility] [address]     due to alcohol withdrawal    Severe dysplasia of cervix     Urine frequency     Withdrawal symptoms, alcohol (HCC)        CURRENT MEDICATIONS:     Current Outpatient Medications   Medication Sig Dispense Refill    esomeprazole (NEXIUM) 40 mg capsule TAKE 1 CAPSULE BY MOUTH ONCE DAILY IN THE MORNING 30 MINUTES BEFORE BREAKFAST  1    lamoTRIgine (LAMICTAL) 25 mg tablet TAKE 1 TABLET BY MOUTH EVERY DAY AT BEDTIME FOR 2 WEEKS  0    mirtazapine (REMERON) 15 mg tablet TAKE 1 TABLET BY MOUTH EVERY DAY AT BEDTIME  2    naltrexone (DEPADE) 50 mg tablet TAKE 1 TABLET BY MOUTH ONCE DAILY  1    propranolol (INDERAL) 20 mg tablet Take  by mouth three (3) times daily.  promethazine (PHENERGAN) 25 mg tablet Take 1 Tab by mouth every six (6) hours as needed for Nausea. Caution: This medication may make you drowsy. Avoid driving while under the influence of this medication. 12 Tab 0    gabapentin (NEURONTIN) 600 mg tablet Take  by mouth three (3) times daily.  carBAMazepine (TEGRETOL) 200 mg tablet Take 200 mg by mouth.  folic acid (FOLVITE) 1 mg tablet Take 1 mg by mouth.  omeprazole (PRILOSEC) 20 mg capsule Take 20 mg by mouth.  levetiracetam (KEPPRA PO) Take 600 mg by mouth two (2) times a day.  QUEtiapine (SEROQUEL) 25 mg tablet Take 100 mg by mouth nightly as needed.  traZODone (DESYREL) 100 mg tablet Take 100 mg by mouth nightly.  hydrOXYzine HCl (ATARAX) 50 mg tablet Take 50 mg by mouth four (4) times daily. ALLERGIES:     Allergies   Allergen Reactions    Seafood Anaphylaxis     Pt is ok with iodine contrast. Patient is allergic to shellfish.      Shellfish Derived Anaphylaxis    Codeine Hives    Penicillins Hives         SURGICAL HISTORY:     Past Surgical History:   Procedure Laterality Date    COLPOSCOPY      x 2    HX CHOLECYSTECTOMY      HX GYN      colposcopy d and c    HX LEEP PROCEDURE      HX OTHER SURGICAL      LIVER SURGERY PROCEDURE UNLISTED  2004    \"Half of liver removed\"        SOCIAL HISTORY:     Social History     Socioeconomic History    Marital status: UNKNOWN     Spouse name: Not on file    Number of children: Not on file    Years of education: Not on file    Highest education level: Not on file   Tobacco Use    Smoking status: Current Every Day Smoker     Packs/day: 1.00     Years: 22.00     Pack years: 22.00    Smokeless tobacco: Never Used   Substance and Sexual Activity    Alcohol use: Yes     Comment: daily liquor moderate amount    Drug use: Yes     Types: Marijuana     Comment: Last used on 10/12/19    Sexual activity: Yes     Partners: Male     Birth control/protection: None       FAMILY HISTORY:     Family History   Problem Relation Age of Onset    Cancer Mother         Cervical and uterine cancer at age 39    Hypertension Mother     COPD Mother     Hypertension Father     Kidney Disease Maternal Aunt     Kidney Disease Maternal Uncle     Hypertension Maternal Grandmother     Other Other         Gallbladder problems and kidnsy stones problems on mom side       REVIEW OF SYSTEMS:     Negative for fevers, chills, chest pain, shortness of breath, weight loss, recent illness     General: Negative for fever and chills. No unexpected change in weight. Denies fatigue. No change in appetite. Skin: Negative for rash or itching. HEENT: Negative for congestion, sore throat, neck pain and neck stiffness. No change in vision or hearing. Hasn't noted any enlarged lymph nodes in the neck. Cardiovascular:  Negative for chest pain and palpitations. Has not noted pedal edema. Respiratory: Negative for cough, colds, sinus, hemoptysis, shortness of breath and wheezing. Gastrointestinal: Negative for nausea and vomiting, rectal bleeding, coffee ground emesis, abdominal pain, diarrhea and constipation. Genitourinary: Negative for dysuria, frequency urgency, or burning on micturition. No flank pain, no foul smelling urine, no difficulty with initiating urination. Hematological: Negative for bleeding or easy bruising. Musculoskeletal: Negative  for arthralgias, back pain or neck pain. Neurological: Negative for dizziness, seizures or syncopal episodes. Denies headaches. Endocrine: Denies excessive thirst.  No heat/cold intolerance. Psychiatric: Negative for depression or insomnia.     PHYSICAL EXAMINATION:     VITALS:   Visit Vitals  /87   Pulse 68   Temp 96.9 °F (36.1 °C) (Oral)   Resp 14   Ht 5' 5\" (1.651 m)   Wt 181 lb 9.6 oz (82.4 kg)   LMP 10/01/2019 SpO2 100%   BMI 30.22 kg/m²       Pain Assessment  10/23/2019   Location of Pain Foot   Location Modifiers Right   Severity of Pain 6   Quality of Pain Throbbing   Duration of Pain Persistent   Frequency of Pain Constant   Aggravating Factors Standing;Walking   Relieving Factors Rest   Relieving Factors Comment -   Result of Injury No   Work-Related Injury -   Type of Injury -        Pain Location: Foot      GEN:  Well developed, well nourished 37 y.o. female in no acute distress. PSYCH: Alert an oriented to person, place and time. Mood, memory, affect, behavior and judgment normal  HEENT: Normocephalic and atraumatic. Eyes: Conjunctivae and EOM are normal.Pupils are equal, round, and reactive to light. External ear normal appearance, external nose normal appearing. Mouth/Throat: Oropharynx is clear and moist, able to handle oral secretions w/out difficulty, airway patent. Dentition - wears braces  NECK: Supple. Normal ROM, No lymphadenopathy. Trachea is midline. No bruising, swelling or deformity  RESP: Clear to auscultation bilaterally. No wheezes, rales, rhonchi. Normal effort and breath sounds. No respiratory distress  CARDIO:  Normal rate, regular rhythm and normal heart sounds. No MGR. ABDOMEN: Soft, non-tender, non-distended, normoactive bowel sounds in all four quadrants. There is no tenderness. There is no rebound and no guarding. BACK: No CVA or spinal tenderness  BREAST:  Deferred  PELVIC:    Deferred   RECTAL:  Deferred   :           Deferred  EXTREMITIES: EXAMINATION OF:  ANKLE/FOOT left    Gait: Normal  Tenderness: Mild tenderness to the cuboid    Mild tenderness to the #3,4,5 metatarsals, middle 1/3 region    Mild tenderness to the bunion   Cutaneous: WNL. Joint Motion: WNL. Joint / Tendon Stability: No Ankle or Subtalar instability or joint laxity. No peroneal sublux ability or dislocation  Alignment: Moderate bunion deformity. Neuro Motor/Sensory: NL/NL.   Vascular: NL foot/ankle pulses. Lymphatics: No extremity lymphedema, No calf swelling, no tenderness to calf muscles. ANKLE/FOOT right    Tenderness: Mild tenderness to the bunion. Cutaneous: WNL. Joint Motion: WNL. Joint / Tendon Stability: No Ankle or Subtalar instability or joint laxity. No peroneal sublux ability or dislocation  Alignment: Moderate to severe bunion deformity. Mobile bunion  Neuro Motor/Sensory: NL/NL. Vascular: NL foot/ankle pulses. Lymphatics: No extremity lymphedema, No calf swelling, no tenderness to calf muscles. RADIOGRAPHS & DIAGNOSTIC STUDIES:     No notes on file    LABS:     @  CBC:   Lab Results   Component Value Date/Time    WBC 5.5 10/21/2019 12:38 PM    RBC 3.82 (L) 10/21/2019 12:38 PM    HGB 11.7 (L) 10/21/2019 12:38 PM    HCT 34.2 (L) 10/21/2019 12:38 PM    PLATELET 235 95/84/6375 12:38 PM   , CMP:   Lab Results   Component Value Date/Time    Glucose 82 10/21/2019 12:38 PM    Sodium 140 10/21/2019 12:38 PM    Potassium 4.2 10/21/2019 12:38 PM    Chloride 105 10/21/2019 12:38 PM    CO2 29 10/21/2019 12:38 PM    BUN 9 10/21/2019 12:38 PM    Creatinine 0.67 10/21/2019 12:38 PM    Calcium 8.8 10/21/2019 12:38 PM    Anion gap 6 10/21/2019 12:38 PM    BUN/Creatinine ratio 13 10/21/2019 12:38 PM    Alk.  phosphatase 116 10/21/2019 12:38 PM    Protein, total 7.9 10/21/2019 12:38 PM    Albumin 3.8 10/21/2019 12:38 PM    Globulin 4.1 (H) 10/21/2019 12:38 PM    A-G Ratio 0.9 10/21/2019 12:38 PM    and Coagulation:   Lab Results   Component Value Date/Time    Prothrombin time 13.1 10/21/2019 12:38 PM    INR 1.0 10/21/2019 12:38 PM       Preoperative labs were reviewed and are substantially within normal limits     Chest X-ray revealed no acute cardiopulmonary process   XR Results (most recent):  Results from Hospital Encounter encounter on 02/05/19   XR CHEST PA LAT    Narrative EXAM: Frontal and lateral views of the chest.    INDICATIONS: chest pain; COMPARISON: None. FINDINGS:    The lungs are clear. Normal cardiomediastinal contours. Thoracic spine is normal for age. Impression IMPRESSION:   No acute cardiopulmonary process. EKG RESULTS     Procedure Component Value Units Date/Time    EKG, 12 LEAD, INITIAL [[id number]] Collected:  10/21/19 1255    Order Status:  Completed Updated:  10/21/19 1416     Ventricular Rate 59 BPM      Atrial Rate 59 BPM      P-R Interval 192 ms      QRS Duration 84 ms      Q-T Interval 420 ms      QTC Calculation (Bezet) 415 ms      Calculated P Axis 52 degrees      Calculated R Axis 34 degrees      Calculated T Axis 4 degrees      Diagnosis --     Sinus bradycardia  Possible Left atrial enlargement  Nonspecific T wave abnormality  Abnormal ECG  No previous ECGs available  Confirmed by Charles Lemus MD, --- ([id number]) on 10/21/2019 2:16:28 PM            ASSESSMENT:       Encounter Diagnoses   Name Primary?  Bilateral bunions Yes    Bilateral foot pain     Pre-operative examination        PLAN:     Again, the alternatives, risks, and complications, as well as expected outcome were discussed. The patient understands and agrees to proceed with the above listed surgery pending medical clearance. Patient has been given Hibiclens wash with instructions and/or prescriptions or orders listed above.     Agnes Eddy PA-C  10/23/2019  9:47 AM

## 2019-10-23 NOTE — PROGRESS NOTES
FOOT AND ANKLE HISTORY AND PHYSICAL      Patient: Leif Olivares                   MRN: [id number]         SSN: [id number]  YOB: 1980                AGE: 37 y.o. SEX: female    Patient scheduled for:  Right Lapidus procedure, modified Bauer distal soft tissue procedure, possible Akin procedure, possible bone grafting   Date of surgery: 11/1/19   [facility]   Surgeon: Den Moore MD  ANESTHESIA TYPE:  General, Popliteal block  CONSULTS: None          PRESCRIPTIONS AND/OR ORDERS PROVIDED DURING H&P:    Orders Placed This Encounter    Generic Supply Order     Walker  Knee scooter  Post op shoe  Raised toilet seat    7-DRUG SCREEN + ETHANOL, UR     Standing Status:   Future     Standing Expiration Date:   12/23/2019               HISTORY:     The patient was seen in the office today for a preoperative history and physical for an upcoming above listed surgery. The patient is a pleasant 37 y.o. female who has a history of pain in her right great toe, over her bunion. She has a severe bunion deformity and is still having pain and discomfort to the right great toe first MTP despite proper wide shoes. She has a moderate to severe bunion deformity with some pronation of the right great toe and abutment of the right number one and number two toes. She reports that she has looked into new shoes, which have not helped with her pain. Possible surgical interventions have been discussed with the patient. The patient notes that she cannot drive due to seizures. 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 She states that she also has a complex renal cyst, as well. She reports that all of her medical records are in the [facility] system. The patient states that she had an endoscopy on Monday and had blood work drawn for this.      Due to the current findings, affected activity of daily living and continued pain and discomfort, surgical intervention is indicated. The alternatives, risks, and complications, including but not limited to infection, blood loss, need for blood transfusion, neurovascular damage, nick-incisional numbness, subcutaneous hematoma, bone fracture, anesthetic complications, DVT, PE, death, RSD, postoperative stiffness and pain, possible surgical scar, delayed healing and nonhealing, reflexive sympathetic dystrophy, damage to blood vessels and nerves, need for more surgery, MI, and stroke, have been discussed. The patient understands and wishes to proceed with surgery. The patient has h/o seizures, ulcers, and partial liver removal. Patient reports daily Vodka consumption and has had withdrawal symptoms in the past. She last use Marijuana on her birthday, 10/[birth year]. The patient works at [employer].      PAST MEDICAL HISTORY:     Past Medical History:   Diagnosis Date    Adverse effect of anesthesia     Anxiety     Endometriosis     EtOH dependence ([facility]     Patient reports daily Vodka consumption for years with withdrawal symptoms in the past    GERD (gastroesophageal reflux disease)     Ill-defined condition     meningitis    Liver mass 2016    small liver mass found per pt during last hospital stay    Orthodontics     Pancreatitis     Peptic ulcer     Renal cyst     Routine lab draw     Patient reports that vein finder may be required    Seizures ([facility]     due to alcohol withdrawal    Severe dysplasia of cervix     Urine frequency     Withdrawal symptoms, alcohol (HCC)        CURRENT MEDICATIONS:     Current Outpatient Medications   Medication Sig Dispense Refill    esomeprazole (NEXIUM) 40 mg capsule TAKE 1 CAPSULE BY MOUTH ONCE DAILY IN THE MORNING 30 MINUTES BEFORE BREAKFAST  1    lamoTRIgine (LAMICTAL) 25 mg tablet TAKE 1 TABLET BY MOUTH EVERY DAY AT BEDTIME FOR 2 WEEKS  0    mirtazapine (REMERON) 15 mg tablet TAKE 1 TABLET BY MOUTH EVERY DAY AT BEDTIME  2    naltrexone (DEPADE) 50 mg tablet TAKE 1 TABLET BY MOUTH ONCE DAILY  1    propranolol (INDERAL) 20 mg tablet Take  by mouth three (3) times daily.  promethazine (PHENERGAN) 25 mg tablet Take 1 Tab by mouth every six (6) hours as needed for Nausea. Caution: This medication may make you drowsy. Avoid driving while under the influence of this medication. 12 Tab 0    gabapentin (NEURONTIN) 600 mg tablet Take  by mouth three (3) times daily.  carBAMazepine (TEGRETOL) 200 mg tablet Take 200 mg by mouth.  folic acid (FOLVITE) 1 mg tablet Take 1 mg by mouth.  omeprazole (PRILOSEC) 20 mg capsule Take 20 mg by mouth.  levetiracetam (KEPPRA PO) Take 600 mg by mouth two (2) times a day.  QUEtiapine (SEROQUEL) 25 mg tablet Take 100 mg by mouth nightly as needed.  traZODone (DESYREL) 100 mg tablet Take 100 mg by mouth nightly.  hydrOXYzine HCl (ATARAX) 50 mg tablet Take 50 mg by mouth four (4) times daily. ALLERGIES:     Allergies   Allergen Reactions    Seafood Anaphylaxis     Pt is ok with iodine contrast. Patient is allergic to shellfish.      Shellfish Derived Anaphylaxis    Codeine Hives    Penicillins Hives         SURGICAL HISTORY:     Past Surgical History:   Procedure Laterality Date    COLPOSCOPY      x 2    HX CHOLECYSTECTOMY      HX GYN      colposcopy d and c    HX LEEP PROCEDURE      HX OTHER SURGICAL      LIVER SURGERY PROCEDURE UNLISTED  2004    \"Half of liver removed\"        SOCIAL HISTORY:     Social History     Socioeconomic History    Marital status: UNKNOWN     Spouse name: Not on file    Number of children: Not on file    Years of education: Not on file    Highest education level: Not on file   Tobacco Use    Smoking status: Current Every Day Smoker     Packs/day: 1.00     Years: 22.00     Pack years: 22.00    Smokeless tobacco: Never Used   Substance and Sexual Activity    Alcohol use: Yes     Comment: daily liquor moderate amount    Drug use: Yes     Types: Marijuana     Comment: Last used on 10/12/19    Sexual activity: Yes     Partners: Male     Birth control/protection: None       FAMILY HISTORY:     Family History   Problem Relation Age of Onset    Cancer Mother         Cervical and uterine cancer at age 39    Hypertension Mother     COPD Mother     Hypertension Father     Kidney Disease Maternal Aunt     Kidney Disease Maternal Uncle     Hypertension Maternal Grandmother     Other Other         Gallbladder problems and kidnsy stones problems on mom side       REVIEW OF SYSTEMS:     Negative for fevers, chills, chest pain, shortness of breath, weight loss, recent illness     General: Negative for fever and chills. No unexpected change in weight. Denies fatigue. No change in appetite. Skin: Negative for rash or itching. HEENT: Negative for congestion, sore throat, neck pain and neck stiffness. No change in vision or hearing. Hasn't noted any enlarged lymph nodes in the neck. Cardiovascular:  Negative for chest pain and palpitations. Has not noted pedal edema. Respiratory: Negative for cough, colds, sinus, hemoptysis, shortness of breath and wheezing. Gastrointestinal: Negative for nausea and vomiting, rectal bleeding, coffee ground emesis, abdominal pain, diarrhea and constipation. Genitourinary: Negative for dysuria, frequency urgency, or burning on micturition. No flank pain, no foul smelling urine, no difficulty with initiating urination. Hematological: Negative for bleeding or easy bruising. Musculoskeletal: Negative  for arthralgias, back pain or neck pain. Neurological: Negative for dizziness, seizures or syncopal episodes. Denies headaches. Endocrine: Denies excessive thirst.  No heat/cold intolerance. Psychiatric: Negative for depression or insomnia.     PHYSICAL EXAMINATION:     VITALS:   Visit Vitals  /87   Pulse 68   Temp 96.9 °F (36.1 °C) (Oral)   Resp 14   Ht 5' 5\" (1.651 m)   Wt 181 lb 9.6 oz (82.4 kg)   LMP 10/01/2019 SpO2 100%   BMI 30.22 kg/m²       Pain Assessment  10/23/2019   Location of Pain Foot   Location Modifiers Right   Severity of Pain 6   Quality of Pain Throbbing   Duration of Pain Persistent   Frequency of Pain Constant   Aggravating Factors Standing;Walking   Relieving Factors Rest   Relieving Factors Comment -   Result of Injury No   Work-Related Injury -   Type of Injury -        Pain Location: Foot      GEN:  Well developed, well nourished 37 y.o. female in no acute distress. PSYCH: Alert an oriented to person, place and time. Mood, memory, affect, behavior and judgment normal  HEENT: Normocephalic and atraumatic. Eyes: Conjunctivae and EOM are normal.Pupils are equal, round, and reactive to light. External ear normal appearance, external nose normal appearing. Mouth/Throat: Oropharynx is clear and moist, able to handle oral secretions w/out difficulty, airway patent. Dentition - wears braces  NECK: Supple. Normal ROM, No lymphadenopathy. Trachea is midline. No bruising, swelling or deformity  RESP: Clear to auscultation bilaterally. No wheezes, rales, rhonchi. Normal effort and breath sounds. No respiratory distress  CARDIO:  Normal rate, regular rhythm and normal heart sounds. No MGR. ABDOMEN: Soft, non-tender, non-distended, normoactive bowel sounds in all four quadrants. There is no tenderness. There is no rebound and no guarding. BACK: No CVA or spinal tenderness  BREAST:  Deferred  PELVIC:    Deferred   RECTAL:  Deferred   :           Deferred  EXTREMITIES: EXAMINATION OF:  ANKLE/FOOT left    Gait: Normal  Tenderness: Mild tenderness to the cuboid    Mild tenderness to the #3,4,5 metatarsals, middle 1/3 region    Mild tenderness to the bunion   Cutaneous: WNL. Joint Motion: WNL. Joint / Tendon Stability: No Ankle or Subtalar instability or joint laxity. No peroneal sublux ability or dislocation  Alignment: Moderate bunion deformity. Neuro Motor/Sensory: NL/NL.   Vascular: NL foot/ankle pulses. Lymphatics: No extremity lymphedema, No calf swelling, no tenderness to calf muscles. ANKLE/FOOT right    Tenderness: Mild tenderness to the bunion. Cutaneous: WNL. Joint Motion: WNL. Joint / Tendon Stability: No Ankle or Subtalar instability or joint laxity. No peroneal sublux ability or dislocation  Alignment: Moderate to severe bunion deformity. Mobile bunion  Neuro Motor/Sensory: NL/NL. Vascular: NL foot/ankle pulses. Lymphatics: No extremity lymphedema, No calf swelling, no tenderness to calf muscles. RADIOGRAPHS & DIAGNOSTIC STUDIES:     No notes on file    LABS:     @  CBC:   Lab Results   Component Value Date/Time    WBC 5.5 10/21/2019 12:38 PM    RBC 3.82 (L) 10/21/2019 12:38 PM    HGB 11.7 (L) 10/21/2019 12:38 PM    HCT 34.2 (L) 10/21/2019 12:38 PM    PLATELET 262 02/61/5647 12:38 PM   , CMP:   Lab Results   Component Value Date/Time    Glucose 82 10/21/2019 12:38 PM    Sodium 140 10/21/2019 12:38 PM    Potassium 4.2 10/21/2019 12:38 PM    Chloride 105 10/21/2019 12:38 PM    CO2 29 10/21/2019 12:38 PM    BUN 9 10/21/2019 12:38 PM    Creatinine 0.67 10/21/2019 12:38 PM    Calcium 8.8 10/21/2019 12:38 PM    Anion gap 6 10/21/2019 12:38 PM    BUN/Creatinine ratio 13 10/21/2019 12:38 PM    Alk.  phosphatase 116 10/21/2019 12:38 PM    Protein, total 7.9 10/21/2019 12:38 PM    Albumin 3.8 10/21/2019 12:38 PM    Globulin 4.1 (H) 10/21/2019 12:38 PM    A-G Ratio 0.9 10/21/2019 12:38 PM    and Coagulation:   Lab Results   Component Value Date/Time    Prothrombin time 13.1 10/21/2019 12:38 PM    INR 1.0 10/21/2019 12:38 PM       Preoperative labs were reviewed and are substantially within normal limits     Chest X-ray revealed no acute cardiopulmonary process   XR Results (most recent):  Results from Hospital Encounter encounter on 02/05/19   XR CHEST PA LAT    Narrative EXAM: Frontal and lateral views of the chest.    INDICATIONS: chest pain; COMPARISON: None. FINDINGS:    The lungs are clear. Normal cardiomediastinal contours. Thoracic spine is normal for age. Impression IMPRESSION:   No acute cardiopulmonary process. EKG RESULTS     Procedure Component Value Units Date/Time    EKG, 12 LEAD, INITIAL [[id number]] Collected:  10/21/19 1255    Order Status:  Completed Updated:  10/21/19 1416     Ventricular Rate 59 BPM      Atrial Rate 59 BPM      P-R Interval 192 ms      QRS Duration 84 ms      Q-T Interval 420 ms      QTC Calculation (Bezet) 415 ms      Calculated P Axis 52 degrees      Calculated R Axis 34 degrees      Calculated T Axis 4 degrees      Diagnosis --     Sinus bradycardia  Possible Left atrial enlargement  Nonspecific T wave abnormality  Abnormal ECG  No previous ECGs available  Confirmed by Edna Molina MD, --- ([id number]) on 10/21/2019 2:16:28 PM            ASSESSMENT:       Encounter Diagnoses   Name Primary?  Bilateral bunions Yes    Bilateral foot pain     Pre-operative examination        PLAN:     Again, the alternatives, risks, and complications, as well as expected outcome were discussed. The patient understands and agrees to proceed with the above listed surgery pending medical clearance. Patient has been given Hibiclens wash with instructions and/or prescriptions or orders listed above.     Jacqui Hernandez PA-C  10/23/2019  9:47 AM

## 2019-10-24 DIAGNOSIS — Z01.818 PRE-OPERATIVE EXAMINATION: ICD-10-CM

## 2019-10-26 ENCOUNTER — HOSPITAL ENCOUNTER (EMERGENCY)
Age: 39
Discharge: LEFT BEFORE DISCHARGE | End: 2019-10-27
Attending: EMERGENCY MEDICINE
Payer: MEDICAID

## 2019-10-26 VITALS
WEIGHT: 181 LBS | RESPIRATION RATE: 18 BRPM | BODY MASS INDEX: 30.16 KG/M2 | HEART RATE: 75 BPM | OXYGEN SATURATION: 100 % | TEMPERATURE: 97.6 F | HEIGHT: 65 IN | SYSTOLIC BLOOD PRESSURE: 118 MMHG | DIASTOLIC BLOOD PRESSURE: 78 MMHG

## 2019-10-26 DIAGNOSIS — N93.9 VAGINAL BLEEDING: Primary | ICD-10-CM

## 2019-10-26 PROCEDURE — 99284 EMERGENCY DEPT VISIT MOD MDM: CPT

## 2019-10-26 RX ORDER — ONDANSETRON 2 MG/ML
4 INJECTION INTRAMUSCULAR; INTRAVENOUS
Status: DISCONTINUED | OUTPATIENT
Start: 2019-10-26 | End: 2019-10-27 | Stop reason: HOSPADM

## 2019-10-27 NOTE — ED PROVIDER NOTES
EMERGENCY DEPARTMENT HISTORY AND PHYSICAL EXAM    11:01 PM      Date: 10/26/2019  Patient Name: De Vincent    History of Presenting Illness     Chief Complaint   Patient presents with    Vaginal Bleeding         History Provided By: Patient    Additional History (Context): De Vincent is a 37 y.o. female with history of alcohol use and pancreatitis who presents with Complaints of vaginal bleeding for the past 9 days. She states that over the last 9 days she has had no relief and passing dark red blood and clots through vaginal canal.  She reports that there is no possibility that she is pregnant, as she has had her \"tubes clipped and burned\". She states that she is not having any urinary symptoms, but states that she is having lower abdominal cramping and pain. The patient reports a history of many abnormal Pap smears, and is having a biopsy procedure done on Monday with her gynecologist.  She also reports multiple LEEP procedures. The patient denies any fevers, chills, or night sweats. Denies vaginal discharge. PCP: Lexus Johns,     Current Facility-Administered Medications   Medication Dose Route Frequency Provider Last Rate Last Dose    sodium chloride 0.9 % bolus infusion 1,000 mL  1,000 mL IntraVENous ONCE Arnold Haider PA-C        ondansetron [facility]) injection 4 mg  4 mg IntraVENous NOW Arnold Haider PA-C         Current Outpatient Medications   Medication Sig Dispense Refill    esomeprazole (NEXIUM) 40 mg capsule TAKE 1 CAPSULE BY MOUTH ONCE DAILY IN THE MORNING 30 MINUTES BEFORE BREAKFAST  1    lamoTRIgine (LAMICTAL) 25 mg tablet TAKE 1 TABLET BY MOUTH EVERY DAY AT BEDTIME FOR 2 WEEKS  0    mirtazapine (REMERON) 15 mg tablet TAKE 1 TABLET BY MOUTH EVERY DAY AT BEDTIME  2    naltrexone (DEPADE) 50 mg tablet TAKE 1 TABLET BY MOUTH ONCE DAILY  1    propranolol (INDERAL) 20 mg tablet Take  by mouth three (3) times daily.       promethazine (PHENERGAN) 25 mg tablet Take 1 Tab by mouth every six (6) hours as needed for Nausea. Caution: This medication may make you drowsy. Avoid driving while under the influence of this medication. 12 Tab 0    gabapentin (NEURONTIN) 600 mg tablet Take  by mouth three (3) times daily.  carBAMazepine (TEGRETOL) 200 mg tablet Take 200 mg by mouth.  folic acid (FOLVITE) 1 mg tablet Take 1 mg by mouth.  omeprazole (PRILOSEC) 20 mg capsule Take 20 mg by mouth.  levetiracetam (KEPPRA PO) Take 600 mg by mouth two (2) times a day.  QUEtiapine (SEROQUEL) 25 mg tablet Take 100 mg by mouth nightly as needed.  traZODone (DESYREL) 100 mg tablet Take 100 mg by mouth nightly.  hydrOXYzine HCl (ATARAX) 50 mg tablet Take 50 mg by mouth four (4) times daily.          Past History     Past Medical History:  Past Medical History:   Diagnosis Date    Adverse effect of anesthesia     Anxiety     Endometriosis     EtOH dependence ([address]     Patient reports daily Vodka consumption for years with withdrawal symptoms in the past    GERD (gastroesophageal reflux disease)     Ill-defined condition     meningitis    Liver mass 2016    small liver mass found per pt during last hospital stay   [place] Pancreatitis     Peptic ulcer     Renal cyst     Routine lab draw     Patient reports that vein finder may be required    Seizures ([address]     due to alcohol withdrawal    Severe dysplasia of cervix     Urine frequency     Withdrawal symptoms, alcohol ([address]        Past Surgical History:  Past Surgical History:   Procedure Laterality Date    COLPOSCOPY      x 2    HX CHOLECYSTECTOMY      HX GYN      colposcopy d and c    HX LEEP PROCEDURE      HX OTHER SURGICAL      LIVER SURGERY PROCEDURE UNLISTED  2004    \"Half of liver removed\"        Family History:  Family History   Problem Relation Age of Onset    Cancer Mother         Cervical and uterine cancer at age 39    Hypertension Mother     COPD Mother  Hypertension Father     Kidney Disease Maternal Aunt     Kidney Disease Maternal Uncle     Hypertension Maternal Grandmother     Other Other         Gallbladder problems and kidnsy stones problems on mom side       Social History:  Social History     Tobacco Use    Smoking status: Current Every Day Smoker     Packs/day: 1.00     Years: 22.00     Pack years: 22.00    Smokeless tobacco: Never Used   Substance Use Topics    Alcohol use: Yes     Comment: daily liquor moderate amount    Drug use: Yes     Types: Marijuana     Comment: Last used on 10/12/19       Allergies: Allergies   Allergen Reactions    Seafood Anaphylaxis     Pt is ok with iodine contrast. Patient is allergic to shellfish.  Shellfish Derived Anaphylaxis    Codeine Hives    Penicillins Hives         Review of Systems       Review of Systems   Constitutional: Negative for chills, diaphoresis, fatigue and fever. Respiratory: Negative for cough, chest tightness, shortness of breath and wheezing. Cardiovascular: Negative for chest pain. Gastrointestinal: Positive for abdominal pain. Negative for blood in stool, constipation, diarrhea, nausea and vomiting. Genitourinary: Positive for menstrual problem, pelvic pain and vaginal bleeding. Negative for difficulty urinating, dysuria, enuresis, flank pain, frequency, genital sores, hematuria, urgency, vaginal discharge and vaginal pain. Musculoskeletal: Negative for back pain and myalgias. Neurological: Negative for syncope, weakness, numbness and headaches. All other systems reviewed and are negative. Physical Exam     Visit Vitals  /78 (BP 1 Location: Left arm, BP Patient Position: At rest)   Pulse 75   Temp 97.6 °F (36.4 °C)   Resp 18   Ht 5' 5\" (1.651 m)   Wt 82.1 kg (181 lb)   LMP 10/01/2019   SpO2 100%   BMI 30.12 kg/m²         Physical Exam   Constitutional: She is oriented to person, place, and time. She appears well-developed and well-nourished.  No distress. HENT:   Head: Normocephalic and atraumatic. Eyes: EOM are normal.   Neck: Neck supple. Cardiovascular: Normal rate, regular rhythm and normal heart sounds. Pulmonary/Chest: Effort normal and breath sounds normal. No respiratory distress. She has no wheezes. She has no rales. Abdominal: Soft. Bowel sounds are normal. She exhibits no distension. There is tenderness in the right lower quadrant and left lower quadrant. There is no rebound and no guarding. Musculoskeletal: Normal range of motion. Neurological: She is alert and oriented to person, place, and time. No cranial nerve deficit. Skin: Skin is warm and dry. Diagnostic Study Results     Labs -  No results found for this or any previous visit (from the past 12 hour(s)). Radiologic Studies -   No orders to display         Medical Decision Making   I am the first provider for this patient. I reviewed the vital signs, available nursing notes, past medical history, past surgical history, family history and social history. Vital Signs-Reviewed the patient's vital signs. Records Reviewed: Nursing Notes and Old Medical Records (Time of Review: 11:01 PM)    ED Course: Progress Notes, Reevaluation, and Consults:  11:09 PM Met with patient, reviewed history, performed physical exam. Will obtain CBC, CMP, urinalysis, urine hCG, and give patient normal saline bolus. 12:55 AM Reexamined patient. Staff unable to obtain blood as patient is a hard stick. Met with patient. She was frustrated with lack of success. Discussed options. Advised patient that it would be beneficial to check her labs, including CBC, CMP, urinalysis, and urine hCG. Asked patient if she would be willing to attempt oral hydration and attempt blood draws again. She stated that \"she was nauseous and Zofran doesn't do sh*t for me, I'm not drinking a gallon of water to be stuck again\". She stated that she wished to leave.  I advised her that this would be against medical advice, as we have not obtained any blood work or urine from the patient. She stated she didn't care and was getting ready to leave. I advised patient that she may be at risk for continued bleeding and hemorrhage. The patient wishes to leave AMA due to not wishing to have further attempts at drawing blood, refusing further attempts. She has the capacity to make this medical decision and understands that lack of blood work provides no diagnostic information regarding the patient's clinical condition. The patient was advised and understands the risk of hemorrhage, death, or permanent disability with leaving AMA and alternatives were offered. The patient understands that she can return at anytime if she changes her mind. The patient was advised to wait for [facility] paperwork, but she has eloped prior to being given AMA paperwork. Provider Notes (Medical Decision Making):  37year old female seen in the ED for complaint of vaginal bleeding. Unable to collect sample for lab work. Patient stated that she did not wish to stay for any further workup. Patient stated that she would leave AMA. Patient offered alternatives, but refused. Patient eloped prior to receiving AMA paperwork. Diagnosis     Clinical Impression:   1. Vaginal bleeding        Disposition: Eloped    Follow-up Information     Follow up With Specialties Details Why Contact Info    Corby Ruggiero DO [facility] In 3 days For follow up regarding ER visit. [address] [address] [address]      [facility] EMERGENCY DEPT Emergency Medicine  Immediately if symptoms worsen. [address]  [phone]           Patient's Medications   Start Taking    No medications on file   Continue Taking    CARBAMAZEPINE (TEGRETOL) 200 MG TABLET    Take 200 mg by mouth.     ESOMEPRAZOLE (NEXIUM) 40 MG CAPSULE    TAKE 1 CAPSULE BY MOUTH ONCE DAILY IN THE MORNING 30 MINUTES BEFORE BREAKFAST    FOLIC ACID (FOLVITE) 1 MG TABLET    Take 1 mg by mouth. GABAPENTIN (NEURONTIN) 600 MG TABLET    Take  by mouth three (3) times daily. HYDROXYZINE HCL (ATARAX) 50 MG TABLET    Take 50 mg by mouth four (4) times daily. LAMOTRIGINE (LAMICTAL) 25 MG TABLET    TAKE 1 TABLET BY MOUTH EVERY DAY AT BEDTIME FOR 2 WEEKS    LEVETIRACETAM (KEPPRA PO)    Take 600 mg by mouth two (2) times a day. MIRTAZAPINE (REMERON) 15 MG TABLET    TAKE 1 TABLET BY MOUTH EVERY DAY AT BEDTIME    NALTREXONE (DEPADE) 50 MG TABLET    TAKE 1 TABLET BY MOUTH ONCE DAILY    OMEPRAZOLE (PRILOSEC) 20 MG CAPSULE    Take 20 mg by mouth. PROMETHAZINE (PHENERGAN) 25 MG TABLET    Take 1 Tab by mouth every six (6) hours as needed for Nausea. Caution: This medication may make you drowsy. Avoid driving while under the influence of this medication. PROPRANOLOL (INDERAL) 20 MG TABLET    Take  by mouth three (3) times daily. QUETIAPINE (SEROQUEL) 25 MG TABLET    Take 100 mg by mouth nightly as needed. TRAZODONE (DESYREL) 100 MG TABLET    Take 100 mg by mouth nightly. These Medications have changed    No medications on file   Stop Taking    No medications on file     Deanna Castañeda PA-C  1:08 AM    Dictation disclaimer:  Please note that this dictation was completed with [employer], the [employer] voice recognition software. Quite often unanticipated grammatical, syntax, homophones, and other interpretive errors are inadvertently transcribed by the computer software. Please disregard these errors. Please excuse any errors that have escaped final proofreading.

## 2019-10-27 NOTE — ED TRIAGE NOTES
Patient presents to the ED for evaluation of vaginal bleeding x9days. Patient states, \"I just keep bleeding and passing clots. I am nausea and have been throwing up. There is no way I am pregnant because I have had my tubes tied. \"

## 2019-10-27 NOTE — DISCHARGE INSTRUCTIONS
Patient Education        Abnormal Uterine Bleeding: Care Instructions  Your Care Instructions    Abnormal uterine bleeding (AUB) is irregular bleeding from the uterus that is longer or heavier than usual or does not occur at your regular time. Sometimes it is caused by changes in hormone levels. It can also be caused by growths in the uterus, such as fibroids or polyps. Sometimes a cause cannot be found. You may have heavy bleeding when you are not expecting your period. Your doctor may suggest a pregnancy test, if you think you are pregnant. Follow-up care is a key part of your treatment and safety. Be sure to make and go to all appointments, and call your doctor if you are having problems. It's also a good idea to know your test results and keep a list of the medicines you take. How can you care for yourself at home? · Be safe with medicines. Take pain medicines exactly as directed. ? If the doctor gave you a prescription medicine for pain, take it as prescribed. ? If you are not taking a prescription pain medicine, ask your doctor if you can take an over-the-counter medicine. · You may be low in iron because of blood loss. Eat a balanced diet that is high in iron and vitamin C. Foods rich in iron include red meat, shellfish, eggs, beans, and leafy green vegetables. Talk to your doctor about whether you need to take iron pills or a multivitamin. When should you call for help? Call 911 anytime you think you may need emergency care. For example, call if:    · You passed out (lost consciousness).    Call your doctor now or seek immediate medical care if:    · You have new or worse belly or pelvic pain.     · You have severe vaginal bleeding.     · You feel dizzy or lightheaded, or you feel like you may faint.    Watch closely for changes in your health, and be sure to contact your doctor if:    · You think you may be pregnant.     · Your bleeding gets worse.     · You do not get better as expected.    Where can you learn more? Go to http://willy-yenny.info/. Enter E974 in the search box to learn more about \"Abnormal Uterine Bleeding: Care Instructions. \"  Current as of: February 19, 2019  Content Version: 12.2  © 6071-1281 [email or web address], [employer]. Care instructions adapted under license by [employer] (which disclaims liability or warranty for this information). If you have questions about a medical condition or this instruction, always ask your healthcare professional. [place] [address] any warranty or liability for your use of this information.

## 2019-10-28 ENCOUNTER — TELEPHONE (OUTPATIENT)
Dept: ORTHOPEDIC SURGERY | Age: 39
End: 2019-10-28

## 2019-10-28 NOTE — TELEPHONE ENCOUNTER
Called patient to inform her of decision to postpone surgery until she 1) has a new PCP and can be cleared and 2) completes some kind of alcoholic treatment. Patient was very argumentative stating that she does not understand how our office can tell her she cannot drink alcohol prior to/after surgery. She is requesting to speak to either Dr. Johnny Charles or Travis Cohen regarding these conditions to surgery. Please call patient.

## 2019-10-28 NOTE — TELEPHONE ENCOUNTER
Spoke with the patient. She states that she called her insurance company and they will not pay for outpatient treatment for her EtOH, they will only pay for inpatient treatment. Also, patient states that her Gastroenterologist prescribed Naltrexone at the end of August 2019 to help control her urge for alcohol and it has helped, because she use to drink a liter of alcohol per day but she has cut back significantly. She understands that we want make sure we are proceeding safely with her surgery. Patient states that she has also tried to find a new PCP, but no one can get her in quickly. She has an appointment with neurology and cardiology on Wednesday. Advised patient I would discuss with Dr. Pako Dukes and call her back tomorrow.          Earl Holliday PA-C  10/28/2019  4:58 PM

## 2019-10-29 ENCOUNTER — OFFICE VISIT (OUTPATIENT)
Dept: NEUROLOGY | Age: 39
End: 2019-10-29

## 2019-10-29 VITALS
OXYGEN SATURATION: 92 % | WEIGHT: 176.6 LBS | TEMPERATURE: 97.4 F | DIASTOLIC BLOOD PRESSURE: 80 MMHG | HEIGHT: 65 IN | BODY MASS INDEX: 29.42 KG/M2 | HEART RATE: 72 BPM | RESPIRATION RATE: 18 BRPM | SYSTOLIC BLOOD PRESSURE: 110 MMHG

## 2019-10-29 DIAGNOSIS — R56.9 SEIZURES (HCC): Primary | ICD-10-CM

## 2019-10-29 DIAGNOSIS — G43.109 MIGRAINE WITH AURA AND WITHOUT STATUS MIGRAINOSUS, NOT INTRACTABLE: ICD-10-CM

## 2019-10-29 RX ORDER — SUMATRIPTAN 100 MG/1
100 TABLET, FILM COATED ORAL
Qty: 9 TAB | Refills: 2 | Status: SHIPPED | OUTPATIENT
Start: 2019-10-29 | End: 2019-10-29

## 2019-10-29 RX ORDER — TOPIRAMATE 25 MG/1
25 TABLET ORAL 2 TIMES DAILY
Qty: 60 TAB | Refills: 2 | Status: SHIPPED | OUTPATIENT
Start: 2019-10-29 | End: 2020-02-28

## 2019-10-29 NOTE — LETTER
10/29/19 Patient: Sanjay Parra YOB: 1980 Date of Visit: 10/29/2019 Milena Clayton PA-C 
[facility] [address] [address] VIA In Basket Dear Milena Clayton PA-C, Thank you for referring Ms. Alo Arevalo to i  for evaluation. My notes for this consultation are attached. If you have questions, please do not hesitate to call me. I look forward to following your patient along with you.  
 
 
Sincerely, 
 
Turner Aranda MD

## 2019-10-29 NOTE — PROGRESS NOTES
Diego Mayo is a 37 y.o., right handed female, who comes in for evaluation and treatment of epilepsy. She's had 3 seizures in the past.  She hasn't had one in 5 years. She was evaluated and treated up at [address] [place] [address] and was told they were alcohol withdrawal related. The last seizure she had was 5 years ago. The last time she drank was last night. She has no recollection of the seizures at all. She has what are described as tonic clonic seizures. They all seem to occur when she withdraws from alcohol. She has 2 cousins that are epileptic. She's on numerous medications for her bipolar syndrome, the only thing she's been placed on specifically for her seizures is Keppra. She's been compliant with her medications. Additionally she has a history of migraines. She started when she was 14years old. She gets a headache every couple of days that lasts 2 days. She gets nausea and vomiting. She has bifrontal throbbing pain. The pain ranges between 7-8/10 usually. She can get relief with sleeping in a dark room. She can't take OTC analgesics because of an ulcer. She sees spots before each headache. Social History; she's single, lives with a friend. Smokes a PPD for the last 20 years. She drinks a pint daily of vodka since 2014. She occasionally smokes marijuana. Works at [employer]. Family History; mother alive with hypertension, COPD, arthritis. Father history unknown. Siblings healthy.     Current Outpatient Medications   Medication Sig Dispense Refill    esomeprazole (NEXIUM) 40 mg capsule TAKE 1 CAPSULE BY MOUTH ONCE DAILY IN THE MORNING 30 MINUTES BEFORE BREAKFAST  1    lamoTRIgine (LAMICTAL) 25 mg tablet TAKE 1 TABLET BY MOUTH EVERY DAY AT BEDTIME FOR 2 WEEKS  0    mirtazapine (REMERON) 15 mg tablet TAKE 1 TABLET BY MOUTH EVERY DAY AT BEDTIME  2    naltrexone (DEPADE) 50 mg tablet TAKE 1 TABLET BY MOUTH ONCE DAILY  1    propranolol (INDERAL) 20 mg tablet Take  by mouth three (3) times daily.      promethazine (PHENERGAN) 25 mg tablet Take 1 Tab by mouth every six (6) hours as needed for Nausea. Caution: This medication may make you drowsy. Avoid driving while under the influence of this medication. 12 Tab 0    gabapentin (NEURONTIN) 600 mg tablet Take  by mouth three (3) times daily.  carBAMazepine (TEGRETOL) 200 mg tablet Take 200 mg by mouth.  folic acid (FOLVITE) 1 mg tablet Take 1 mg by mouth.  omeprazole (PRILOSEC) 20 mg capsule Take 20 mg by mouth.  levetiracetam (KEPPRA PO) Take 600 mg by mouth two (2) times a day.  QUEtiapine (SEROQUEL) 25 mg tablet Take 100 mg by mouth nightly as needed.  traZODone (DESYREL) 100 mg tablet Take 100 mg by mouth nightly.  hydrOXYzine HCl (ATARAX) 50 mg tablet Take 50 mg by mouth four (4) times daily. Past Medical History:   Diagnosis Date    Adverse effect of anesthesia     Anxiety     Endometriosis     EtOH dependence ([address]     Patient reports daily Vodka consumption for years with withdrawal symptoms in the past    GERD (gastroesophageal reflux disease)     Ill-defined condition     meningitis    Liver mass 2016    small liver mass found per pt during last hospital stay   [place] Pancreatitis     Peptic ulcer     Renal cyst     Routine lab draw     Patient reports that vein finder may be required    Seizures ([address]     due to alcohol withdrawal    Severe dysplasia of cervix     Urine frequency     Withdrawal symptoms, alcohol ([address]        Past Surgical History:   Procedure Laterality Date    COLPOSCOPY      x 2    HX CHOLECYSTECTOMY      HX GYN      colposcopy d and c    HX LEEP PROCEDURE      HX OTHER SURGICAL      LIVER SURGERY PROCEDURE UNLISTED  2004    \"Half of liver removed\"        Allergies   Allergen Reactions    Seafood Anaphylaxis     Pt is ok with iodine contrast. Patient is allergic to shellfish.      Shellfish Derived Anaphylaxis    Codeine Hives    Penicillins Hives       Patient Active Problem List   Diagnosis Code    Abdominal pain R10.9         Review of Systems:   As above otherwise 11 point review of systems negative including;   Constitutional no fever or chills  Skin denies rash or itching  HENT  Denies tinnitus, hearing lose  Eyes denies diplopia vision lose  Respiratory denies shortness of breath  Cardiovascular denies chest pain, dyspnea on exertion  Gastrointestinal denies nausea, vomiting, diarrhea, constipation  Genitourinary denies incontinence  Musculoskeletal denies joint pain or swelling  Endocrine denies weight change  Hematology denies easy bruising or bleeding   Neurological as above in HPI      PHYSICAL EXAMINATION:      VITAL SIGNS:    Visit Vitals  /80 (BP 1 Location: Left arm, BP Patient Position: Sitting)   Pulse 72   Temp 97.4 °F (36.3 °C) (Oral)   Resp 18   Ht 5' 5\" (1.651 m)   Wt 80.1 kg (176 lb 9.6 oz)   LMP 10/18/2019 (Within Days)   SpO2 92%   BMI 29.39 kg/m²       GENERAL: The patient is well developed, well nourished, and in no apparent distress. EXTREMITIES: No clubbing, cyanosis, or edema is identified. Pulses 2+ and symmetrical.  Muscle tone is normal.  HEAD:   Ear, nose, and throat appear to be without trauma. The patient is normocephalic. NEUROLOGIC EXAMINATION    MENTAL STATUS: The patient is awake, alert, and oriented x 4. Fund of knowledge is adequate. Speech is fluent and memory appears to be intact, both long and short term. CRANIAL NERVES: II - Visual fields are full to confrontation. Funduscopic examination reveals flat disks bilaterally. Pupils are both 4 mm and briskly reactive to light and accommodation. III, IV, VI - Extraocular movements are intact and there is no nystagmus. V - Facial sensation is intact to pinprick and light touch. VII - Face is symmetrical.   VIII - Hearing is present. IX, X, XII- Palate rises symmetrically. Gag is present. Tongue is in the midline.       XI - Shoulder shrugging and head turning intact  MOTOR:  The patient is 5/5 in all four limbs without any drift. Fine finger movements are symmetrical.  Isolated motor group testing reveals no focal abnormalities. Tone is normal.  Sensory examination is intact to pinprick, light touch and position sense testing. Reflexes are 2+ and symmetrical. Plantars are down going. Cerebellar examination reveals no gross ataxia or dysmetria. Gait is normal and the patient can tandem walk without any difficulty. CBC:   Lab Results   Component Value Date/Time    WBC 5.5 10/21/2019 12:38 PM    RBC 3.82 (L) 10/21/2019 12:38 PM    HGB 11.7 (L) 10/21/2019 12:38 PM    HCT 34.2 (L) 10/21/2019 12:38 PM    PLATELET 128 59/50/9050 12:38 PM     BMP:   Lab Results   Component Value Date/Time    Glucose 82 10/21/2019 12:38 PM    Sodium 140 10/21/2019 12:38 PM    Potassium 4.2 10/21/2019 12:38 PM    Chloride 105 10/21/2019 12:38 PM    CO2 29 10/21/2019 12:38 PM    BUN 9 10/21/2019 12:38 PM    Creatinine 0.67 10/21/2019 12:38 PM    Calcium 8.8 10/21/2019 12:38 PM     CMP:   Lab Results   Component Value Date/Time    Glucose 82 10/21/2019 12:38 PM    Sodium 140 10/21/2019 12:38 PM    Potassium 4.2 10/21/2019 12:38 PM    Chloride 105 10/21/2019 12:38 PM    CO2 29 10/21/2019 12:38 PM    BUN 9 10/21/2019 12:38 PM    Creatinine 0.67 10/21/2019 12:38 PM    Calcium 8.8 10/21/2019 12:38 PM    Anion gap 6 10/21/2019 12:38 PM    BUN/Creatinine ratio 13 10/21/2019 12:38 PM    Alk.  phosphatase 116 10/21/2019 12:38 PM    Protein, total 7.9 10/21/2019 12:38 PM    Albumin 3.8 10/21/2019 12:38 PM    Globulin 4.1 (H) 10/21/2019 12:38 PM    A-G Ratio 0.9 10/21/2019 12:38 PM     Coagulation:   Lab Results   Component Value Date/Time    Prothrombin time 13.1 10/21/2019 12:38 PM    INR 1.0 10/21/2019 12:38 PM     Cardiac markers: No results found for: CPK, CKND1, CARINA       Impression: Very complex patient with history of seizures related to alcohol use and withdrawal as well as a history of migraine headaches. She also has bipolar syndrome which is being treated by her psychiatrist.  She is currently on numerous medications many of which seem to have the same clinical application. She has not had any recent seizures but does continue to suffer greatly from migraines. She also continues to have ongoing alcohol abuse. Plan: Going to try to simplify her medications a little bit. Personally I am going to stop the Keppra which does not seem to be necessary at this time. She has not had a seizure in which she says is 5 years and she is on at least 3 other anticonvulsants for various other problems. I will include Tegretol and Lamictal for her bipolar syndrome and Neurontin for chronic pain. We will start her on a low-dose of Topamax for her migraine headaches. I am also been given some Imitrex for headaches. Hopefully with better pain control she will tend to decrease her alcohol use which she states is being used to treat pain. I am going to leave dosing of her Neurontin, Tegretol and Lamictal to her psychiatrist.  An EEG will be obtained per to see her back here in approximately 4 weeks time. Thank you for allowing me to evaluate this patient. PLEASE NOTE:   This document has been produced using voice recognition software. Unrecognized errors in transcription may be present.

## 2019-10-29 NOTE — PROGRESS NOTES
Aleksandra Pickering is a 37 y.o. female new patient in today to discuss seizures; as referred by Jim Isidro DO.

## 2019-10-30 ENCOUNTER — OFFICE VISIT (OUTPATIENT)
Dept: CARDIOLOGY CLINIC | Age: 39
End: 2019-10-30

## 2019-10-30 VITALS
OXYGEN SATURATION: 98 % | BODY MASS INDEX: 29.32 KG/M2 | WEIGHT: 176 LBS | SYSTOLIC BLOOD PRESSURE: 114 MMHG | HEIGHT: 65 IN | DIASTOLIC BLOOD PRESSURE: 82 MMHG | HEART RATE: 63 BPM

## 2019-10-30 DIAGNOSIS — R06.02 SOB (SHORTNESS OF BREATH): Primary | ICD-10-CM

## 2019-10-30 DIAGNOSIS — R94.31 ABNORMAL EKG: ICD-10-CM

## 2019-10-30 RX ORDER — SUMATRIPTAN 100 MG/1
100 TABLET, FILM COATED ORAL
COMMUNITY
End: 2020-01-24 | Stop reason: SDUPTHER

## 2019-10-30 NOTE — PROGRESS NOTES
Maggie Craven presents today for   Chief Complaint   Patient presents with    Surgical Clearance     Right foot lapidus fusion with Dr. Rosa Ramirez at [facility]     Shortness of Breath     4 days       Maggie Craven preferred language for health care discussion is english/other. Is someone accompanying this pt? yes    Is the patient using any DME equipment during [address]? no    Depression Screening:  3 most recent PHQ Screens 10/23/2019   Little interest or pleasure in doing things Not at all   Feeling down, depressed, irritable, or hopeless Not at all   Total Score PHQ 2 0       Learning Assessment:  Learning Assessment 10/30/2019   PRIMARY LEARNER Patient   BARRIERS PRIMARY LEARNER [address] CAREGIVER No   PRIMARY LANGUAGE ENGLISH    NEED No   LEARNER PREFERENCE PRIMARY READING   ANSWERED BY patient   RELATIONSHIP SELF       Abuse Screening:  Abuse Screening Questionnaire 10/30/2019   Do you ever feel afraid of your partner? N   Are you in a relationship with someone who physically or mentally threatens you? N   Is it safe for you to go home? Y       Fall Risk  Fall Risk Assessment, last 12 mths 10/30/2019   Able to walk? Yes   Fall in past 12 months? No       Pt currently taking Anticoagulant therapy? no    Coordination of Care:  1. Have you been to the ER, urgent care clinic since your last visit? Hospitalized since your last visit? no    2. Have you seen or consulted any other health care providers outside of the [address] [place] [address] since your last visit? Include any pap smears or colon screening.  no

## 2019-10-30 NOTE — PROGRESS NOTES
[address]    Chief Complaint   Patient presents with    Surgical Clearance     Right foot lapidus fusion with Dr. Santiago Rubin at [facility]     Shortness of Breath     4 days       HPI:   Lissy Melo is a 37 y.o.  AAF with no known heart disease here for perioperative risk stratification prior to foot surgery. As you know patient does have a history of alcohol dependence history of alcohol withdrawal.  She had a routine EKG for perioperative risk stratification and was sent to us due to abnormal EKG. I repeated her EKG today and compared to prior (see below). She has no known hypertension, denies diabetes no dyslipidemia. She denies preeclampsia no gestational diabetes. She does admit to current alcoholism (daily vodka all day) but denies IVDA/ other illicits. She c/o SOB that is new, about 4 days. Sporadic and no other associated symptoms. Denies palps, no syncope, no CP, no edema.  No known CV hx.    CV RFs: Polysubstance abuse (incl tobacco)    Past Medical History:   Diagnosis Date    Adverse effect of anesthesia     Anxiety     Endometriosis     EtOH dependence ([address]     Patient reports daily Vodka consumption for years with withdrawal symptoms in the past    GERD (gastroesophageal reflux disease)     Ill-defined condition     meningitis    Liver mass 2016    small liver mass found per pt during last hospital stay    Orthodontics     Pancreatitis     Peptic ulcer     Renal cyst     Routine lab draw     Patient reports that vein finder may be required    Seizures ([address]     due to alcohol withdrawal    Severe dysplasia of cervix     Urine frequency     Withdrawal symptoms, alcohol ([address]        Past Surgical History:   Procedure Laterality Date    COLPOSCOPY      x 2    HX CHOLECYSTECTOMY      HX GYN      colposcopy d and c    HX LEEP PROCEDURE      HX OTHER SURGICAL      LIVER SURGERY PROCEDURE UNLISTED      \"Half of liver removed\"        Current Outpatient Medications Medication Sig Dispense Refill    SUMAtriptan (IMITREX) 100 mg tablet Take 100 mg by mouth once as needed for Migraine.  esomeprazole (NEXIUM) 40 mg capsule TAKE 1 CAPSULE BY MOUTH ONCE DAILY IN THE MORNING 30 MINUTES BEFORE BREAKFAST  1    lamoTRIgine (LAMICTAL) 25 mg tablet TAKE 1 TABLET BY MOUTH EVERY DAY AT BEDTIME FOR 2 WEEKS  0    mirtazapine (REMERON) 15 mg tablet TAKE 1 TABLET BY MOUTH EVERY DAY AT BEDTIME  2    naltrexone (DEPADE) 50 mg tablet TAKE 1 TABLET BY MOUTH ONCE DAILY  1    propranolol (INDERAL) 20 mg tablet Take  by mouth three (3) times daily.  promethazine (PHENERGAN) 25 mg tablet Take 1 Tab by mouth every six (6) hours as needed for Nausea. Caution: This medication may make you drowsy. Avoid driving while under the influence of this medication. 12 Tab 0    gabapentin (NEURONTIN) 600 mg tablet Take  by mouth three (3) times daily.  carBAMazepine (TEGRETOL) 200 mg tablet Take 200 mg by mouth.  folic acid (FOLVITE) 1 mg tablet Take 1 mg by mouth.  omeprazole (PRILOSEC) 20 mg capsule Take 20 mg by mouth.  QUEtiapine (SEROQUEL) 25 mg tablet Take 100 mg by mouth nightly as needed.  traZODone (DESYREL) 100 mg tablet Take 100 mg by mouth nightly.  hydrOXYzine HCl (ATARAX) 50 mg tablet Take 50 mg by mouth four (4) times daily.  topiramate (TOPAMAX) 25 mg tablet Take 1 Tab by mouth two (2) times a day. 60 Tab 2       Allergies   Allergen Reactions    Seafood Anaphylaxis     Pt is ok with iodine contrast. Patient is allergic to shellfish.      Shellfish Derived Anaphylaxis    Codeine Hives    Penicillins Hives       Social History     Socioeconomic History    Marital status:      Spouse name: Not on file    Number of children: Not on file    Years of education: Not on file    Highest education level: Not on file   Occupational History    Not on file   Social Needs    Financial resource strain: Not on file    Food insecurity: Worry: Not on file     Inability: Not on file    Transportation needs:     Medical: Not on file     Non-medical: Not on file   Tobacco Use    Smoking status: Current Every Day Smoker     Packs/day: 1.00     Years: 22.00     Pack years: 22.00    Smokeless tobacco: Never Used   Substance and Sexual Activity    Alcohol use: Yes     Comment: daily liquor moderate amount    Drug use: Yes     Types: Marijuana     Comment: Last used on 10/12/19    Sexual activity: Yes     Partners: Male     Birth control/protection: None   Lifestyle    Physical activity:     Days per week: Not on file     Minutes per session: Not on file    Stress: Not on file   Relationships    Social connections:     Talks on phone: Not on file     Gets together: Not on file     Attends Zoroastrianism service: Not on file     Active member of club or organization: Not on file     Attends meetings of clubs or organizations: Not on file     Relationship status: Not on file    Intimate partner violence:     Fear of current or ex partner: Not on file     Emotionally abused: Not on file     Physically abused: Not on file     Forced sexual activity: Not on file   Other Topics Concern    Not on file   Social History Narrative    Not on file        FH: no premature CAD or SCD    Review of Systems    14 pt Review of Systems is negative unless otherwise mentioned in the HPI.     Wt Readings from Last 3 Encounters:   10/30/19 79.8 kg (176 lb)   10/29/19 80.1 kg (176 lb 9.6 oz)   10/26/19 82.1 kg (181 lb)     Temp Readings from Last 3 Encounters:   10/29/19 97.4 °F (36.3 °C) (Oral)   10/26/19 97.6 °F (36.4 °C)   10/23/19 96.9 °F (36.1 °C) (Oral)     BP Readings from Last 3 Encounters:   10/30/19 114/82   10/29/19 110/80   10/26/19 118/78     Pulse Readings from Last 3 Encounters:   10/30/19 63   10/29/19 72   10/26/19 75     Physical Exam:    Visit Vitals  /82   Pulse 63   Ht 5' 5\" (1.651 m)   Wt 79.8 kg (176 lb)   LMP 10/18/2019 (Within Days)   SpO2 98% BMI 29.29 kg/m²      Physical Exam   Constitutional: She is oriented to person, place, and time. She appears well-developed and well-nourished. HENT:   Head: Normocephalic and atraumatic. Eyes: Pupils are equal, round, and reactive to light. EOM are normal.   Neck: No JVD present. Cardiovascular: Normal rate, regular rhythm, normal heart sounds and intact distal pulses. Exam reveals no gallop and no friction rub. No murmur heard. Pulmonary/Chest: Effort normal and breath sounds normal. No respiratory distress. She has no wheezes. She has no rales. She exhibits no tenderness. Abdominal: Soft. Bowel sounds are normal.   Musculoskeletal: She exhibits no edema or tenderness. Neurological: She is alert and oriented to person, place, and time. Skin: Skin is warm and dry. Psychiatric: She has a normal mood and affect. EKG today shows: NSR, normal axis and intervals, nonspecific ST segment abnormalities- most likely body habitus when compared to prior ekgs    Impression and Plan:  Leif Olivares is a 37 y.o. with:    1.) Perioperative risk stratification prior to foot surgery with Dr. Genevieve Mireles  2.) Abnormal EKG  3.) Atypical SOB  4.) ETOH abuse  5.) Tobacco abuse    1.) Echocardiogram r/o CMP etc  2.) If echo benign/ call follow up prn    While she has good functional capacity, no CP, no known CV disease-  I'm most concerned about her active etoh abuse/ addiction and her risk for cardiomyopathy  Her SOB is atypical and no fluid overload- but taken together I took the liberty of getting an echocardiogram  Will plan to call her with results     Thank you for allowing me to participate in the care of your patient, please do not hesitate to call with questions or concerns.     Kindest Regards,    Baltazar Fairly, DO

## 2019-10-30 NOTE — PATIENT INSTRUCTIONS
Echo for sob pending results for clearance for orthopedic procedure Follow up as needed if testing normal 
If you have not heard from the central scheduler to schedule your testing in 48 hours, please call [phone].  '

## 2019-10-31 NOTE — TELEPHONE ENCOUNTER
Spoke with patient and advised her that Dr. Aimee Martinez is still concerned with her alcohol dependence and post op pain control. He would like the issue with her alcohol dependence better controlled, even if she has to be admitted to an in-patient program. Pain following bunion surgery can be significant and we do not want to create another dependency problem due to an elective bunion surgery. Patient stated that she has an appointment with her new PCP, Dayne Lombard, PA-C to establish care. Advised patient that I will contact Fariba Moore for input on how to best manage the alcohol dependence and to see if there are any other treatment options that her insurance company will pay for. Patient stated that her insurance company told her to go to the ED to be admitted for inpatient treatment for alcohol dependence. I advised that patient that I will reach out to Fariba Moore. I offered to provided a work note for sedentary duty or no duty if sedentary duty is not available. Patient states that she would seek out another surgeon. Advised patient that I would still contact Fariba Moore and the offer for the work note is still available.     Diana Nuñez PA-C  10/31/2019  10:47 AM

## 2019-11-04 ENCOUNTER — HOSPITAL ENCOUNTER (OUTPATIENT)
Dept: MRI IMAGING | Age: 39
Discharge: HOME | End: 2019-11-04
Attending: PHYSICIAN ASSISTANT

## 2019-11-05 ENCOUNTER — OFFICE VISIT (OUTPATIENT)
Dept: FAMILY MEDICINE CLINIC | Age: 39
End: 2019-11-05

## 2019-11-05 VITALS
BODY MASS INDEX: 28.99 KG/M2 | TEMPERATURE: 97.6 F | HEART RATE: 82 BPM | OXYGEN SATURATION: 97 % | HEIGHT: 65 IN | SYSTOLIC BLOOD PRESSURE: 119 MMHG | DIASTOLIC BLOOD PRESSURE: 85 MMHG | WEIGHT: 174 LBS | RESPIRATION RATE: 18 BRPM

## 2019-11-05 DIAGNOSIS — Z86.59 HX OF BIPOLAR DISORDER: ICD-10-CM

## 2019-11-05 DIAGNOSIS — K86.0 ALCOHOL-INDUCED CHRONIC PANCREATITIS (HCC): ICD-10-CM

## 2019-11-05 DIAGNOSIS — M21.619 BUNION: ICD-10-CM

## 2019-11-05 DIAGNOSIS — G40.909 SEIZURE DISORDER (HCC): ICD-10-CM

## 2019-11-05 DIAGNOSIS — F10.10 ALCOHOL ABUSE: Primary | ICD-10-CM

## 2019-11-05 NOTE — PROGRESS NOTES
Mg Howell is a 37 y.o. female who presents to the office today for evaluation of alcoholism. She is new to me. She comes in at the request of Dr. Dre Godwin. She is suppposed to have elective surgery for painful bunions. She has a significant hx of alcoholism. She also has hx of bipolar disorder- managed by psych,  Migraines and seizures which were thought to be induced by alcohol use and withdrawal- seeing Neuro, pancreatitis, benign liver mass removed 2003, tobacco Use. She states she cannot quantify how much she drinks daily, estimates about 1 pint of vodka daily. Her previous PCP was Aaron Baltazar up until a few weeks ago and she was discharged from their practice because she had a disagreement with the provider- her PCP wanted her to be cleared by Cardiology and Neurology as part of pre-op clearance and Ms. Ar Calderon was not happy about this, but she did not to both appointments. Ms. Ar Calderon has gone through inpatient alcohol treatment previously, but this has not helped. She is very argumentative today, asking if Dr. Dre Godwin even wants to perform surgery on her. She says how will anyone even know if she quits drinking. \"do they want me to write my name in blood or something? \"  She has already contacted her insurance company and they informed her that they will only cover Inpatient alcohol treatment and she must go through the ED. Chief Complaint   Patient presents with    Bunions     requries surgery   . Current Outpatient Medications on File Prior to Visit   Medication Sig Dispense Refill    SUMAtriptan (IMITREX) 100 mg tablet Take 100 mg by mouth once as needed for Migraine.  topiramate (TOPAMAX) 25 mg tablet Take 1 Tab by mouth two (2) times a day.  60 Tab 2    lamoTRIgine (LAMICTAL) 25 mg tablet TAKE 1 TABLET BY MOUTH EVERY DAY AT BEDTIME FOR 2 WEEKS  0    mirtazapine (REMERON) 15 mg tablet TAKE 1 TABLET BY MOUTH EVERY DAY AT BEDTIME  2    naltrexone (DEPADE) 50 mg tablet TAKE 1 TABLET BY MOUTH ONCE DAILY  1    propranolol (INDERAL) 20 mg tablet Take  by mouth three (3) times daily.  gabapentin (NEURONTIN) 600 mg tablet Take  by mouth three (3) times daily.  carBAMazepine (TEGRETOL) 200 mg tablet Take 200 mg by mouth.  folic acid (FOLVITE) 1 mg tablet Take 1 mg by mouth.  QUEtiapine (SEROQUEL) 25 mg tablet Take 100 mg by mouth nightly as needed.  traZODone (DESYREL) 100 mg tablet Take 100 mg by mouth nightly.  hydrOXYzine HCl (ATARAX) 50 mg tablet Take 50 mg by mouth four (4) times daily.  esomeprazole (NEXIUM) 40 mg capsule TAKE 1 CAPSULE BY MOUTH ONCE DAILY IN THE MORNING 30 MINUTES BEFORE BREAKFAST  1     No current facility-administered medications on file prior to visit. Allergies   Allergen Reactions    Seafood Anaphylaxis     Pt is ok with iodine contrast. Patient is allergic to shellfish.      Shellfish Derived Anaphylaxis    Codeine Hives    Penicillins Hives     Past Medical History:   Diagnosis Date    Adverse effect of anesthesia     Anxiety     Endometriosis     EtOH dependence ([facility]     Patient reports daily Vodka consumption for years with withdrawal symptoms in the past    GERD (gastroesophageal reflux disease)     Ill-defined condition     meningitis    Liver mass 2016    small liver mass found per pt during last hospital stay    Orthodontics     Pancreatitis     Peptic ulcer     Renal cyst     Routine lab draw     Patient reports that vein finder may be required    Seizures ([facility] [facility]     due to alcohol withdrawal    Severe dysplasia of cervix     Urine frequency     Withdrawal symptoms, alcohol (HCC)      Social History     Tobacco Use   Smoking Status Current Every Day Smoker    Packs/day: 1.00    Years: 22.00    Pack years: 22.00   Smokeless Tobacco Never Used     Social History     Substance and Sexual Activity   Alcohol Use Yes    Comment: daily liquor moderate amount     Family History   Problem Relation Age of Onset    Cancer Mother         Cervical and uterine cancer at age 39    Hypertension Mother     COPD Mother     Hypertension Father     Kidney Disease Maternal Aunt     Kidney Disease Maternal Uncle     Hypertension Maternal Grandmother     Other Other         Gallbladder problems and kidnsy stones problems on mom side       Review of Systems   Constitutional: Negative for diaphoresis, fever and malaise/fatigue. Eyes: Negative for blurred vision. Respiratory: Negative for cough and shortness of breath. Cardiovascular: Negative for chest pain, palpitations and leg swelling. Gastrointestinal: Negative for diarrhea, heartburn and vomiting. Musculoskeletal: Negative for falls. Skin: Negative for rash. Neurological: Positive for headaches. Negative for dizziness, seizures and loss of consciousness. Last seizure was 5 years ago   Psychiatric/Behavioral: Positive for depression and substance abuse. The patient is nervous/anxious. Alcohol, tobacco, marijuana  Bipolar- managed by psych     Visit Vitals  /85 (BP 1 Location: Left arm, BP Patient Position: Sitting)   Pulse 82   Temp 97.6 °F (36.4 °C) (Oral)   Resp 18   Ht 5' 5\" (1.651 m)   Wt 174 lb (78.9 kg)   LMP 10/18/2019 (Within Days)   SpO2 97%   BMI 28.96 kg/m²     Physical Exam   Constitutional: She is oriented to person, place, and time and well-developed, well-nourished, and in no distress. HENT:   Head: Atraumatic. Eyes: Conjunctivae are normal. No scleral icterus. Neck: Neck supple. No thyromegaly present. Cardiovascular: Normal rate, regular rhythm, normal heart sounds and intact distal pulses. Pulmonary/Chest: Effort normal and breath sounds normal.   Abdominal: Soft. She exhibits no distension. There is no tenderness. There is no guarding. Musculoskeletal:         General: No edema. Neurological: She is alert and oriented to person, place, and time. Skin: Skin is warm and dry.    Psychiatric: Mood, memory and judgment normal. She is agitated. Nursing note and vitals reviewed. Assessment and Plan    ICD-10-CM ICD-9-CM    1. Alcohol abuse F10.10 305.00    2. Alcohol-induced chronic pancreatitis ([facility] [address] K86.0 577.1    3. Bunion M21.619 727.1    4. Hx of bipolar disorder Z86.59 V11.1    5. Seizure disorder [facility]) G40.909 345.90      My staff spoke with her insurance company and we were given several numbers to contact regarding outpatient treatment centers for alcohol detox. Ms. Jerod Parada was given this list to call and set this up. She does not seem to be interested in quitting drinking alcohol for long term, just for pre and post op to get through surgery. Follow up with Dr. Avery Li regarding surgery status  Bipolar disorder- continue close follow up with psychiatrist for medication management. Seizures- recently stopped keppra per Neuro. She is continued on Tegretol and Lamictal, neurontin for chronic pain in hopes she will drink less alcohol to \"treat\" the pain. Reviewed medication and side effects. Patient agrees with the plan and verbalizes understanding. Susana Zuniga PA-C  12/3/2019    PLEASE NOTE:  This document has been produced using voice recognition software. Unrecognized errors in transcription may be present.

## 2019-11-05 NOTE — PROGRESS NOTES
Cherelle Vargas is a 37 y.o. female new pt establishing care. Former PCP was Dr. Pako Maurer. Her Foot specialists (Dr. Javed Junior) wants to do surgery on her right foot for bunion that is causing deformation but pt is an alcoholic and he will need her to be in a controlled environment after surgery to monitor her so she does not drink alcohol. Her insurance only covers inpatient treatment, not outpatient.  She recently went through detox and severe depression at a [facility].

## 2019-11-09 ENCOUNTER — HOSPITAL ENCOUNTER (EMERGENCY)
Age: 39
Discharge: HOME | End: 2019-11-09
Attending: EMERGENCY MEDICINE
Payer: MEDICAID

## 2019-11-09 VITALS
RESPIRATION RATE: 12 BRPM | HEIGHT: 65 IN | DIASTOLIC BLOOD PRESSURE: 88 MMHG | OXYGEN SATURATION: 100 % | TEMPERATURE: 97.9 F | WEIGHT: 175 LBS | HEART RATE: 73 BPM | SYSTOLIC BLOOD PRESSURE: 130 MMHG | BODY MASS INDEX: 29.16 KG/M2

## 2019-11-09 DIAGNOSIS — R10.12 ABDOMINAL PAIN, LUQ (LEFT UPPER QUADRANT): Primary | ICD-10-CM

## 2019-11-09 DIAGNOSIS — Z76.5 MALINGERING: ICD-10-CM

## 2019-11-09 LAB
ALBUMIN SERPL-MCNC: 4.3 G/DL (ref 3.4–5)
ALBUMIN/GLOB SERPL: 0.8 {RATIO} (ref 0.8–1.7)
ALP SERPL-CCNC: 128 U/L (ref 45–117)
ALT SERPL-CCNC: 44 U/L (ref 13–56)
ANION GAP SERPL CALC-SCNC: 9 MMOL/L (ref 3–18)
APPEARANCE UR: CLEAR
AST SERPL-CCNC: 69 U/L (ref 10–38)
BACTERIA URNS QL MICRO: ABNORMAL /HPF
BASOPHILS # BLD: 0 K/UL (ref 0–0.1)
BASOPHILS NFR BLD: 0 % (ref 0–2)
BILIRUB SERPL-MCNC: 0.5 MG/DL (ref 0.2–1)
BILIRUB UR QL: NEGATIVE
BUN SERPL-MCNC: 11 MG/DL (ref 7–18)
BUN/CREAT SERPL: 14 (ref 12–20)
CALCIUM SERPL-MCNC: 9.3 MG/DL (ref 8.5–10.1)
CHLORIDE SERPL-SCNC: 102 MMOL/L (ref 100–111)
CO2 SERPL-SCNC: 28 MMOL/L (ref 21–32)
COLOR UR: ABNORMAL
CREAT SERPL-MCNC: 0.77 MG/DL (ref 0.6–1.3)
DIFFERENTIAL METHOD BLD: NORMAL
EOSINOPHIL # BLD: 0.1 K/UL (ref 0–0.4)
EOSINOPHIL NFR BLD: 3 % (ref 0–5)
EPITH CASTS URNS QL MICRO: ABNORMAL /LPF (ref 0–5)
ERYTHROCYTE [DISTWIDTH] IN BLOOD BY AUTOMATED COUNT: 13.8 % (ref 11.6–14.5)
GLOBULIN SER CALC-MCNC: 5.1 G/DL (ref 2–4)
GLUCOSE SERPL-MCNC: 112 MG/DL (ref 74–99)
GLUCOSE UR STRIP.AUTO-MCNC: NEGATIVE MG/DL
HCT VFR BLD AUTO: 38.1 % (ref 35–45)
HGB BLD-MCNC: 13.3 G/DL (ref 12–16)
HGB UR QL STRIP: NEGATIVE
KETONES UR QL STRIP.AUTO: NEGATIVE MG/DL
LEUKOCYTE ESTERASE UR QL STRIP.AUTO: ABNORMAL
LIPASE SERPL-CCNC: 199 U/L (ref 73–393)
LYMPHOCYTES # BLD: 1.3 K/UL (ref 0.9–3.6)
LYMPHOCYTES NFR BLD: 28 % (ref 21–52)
MCH RBC QN AUTO: 30.4 PG (ref 24–34)
MCHC RBC AUTO-ENTMCNC: 34.9 G/DL (ref 31–37)
MCV RBC AUTO: 87 FL (ref 74–97)
MONOCYTES # BLD: 0.2 K/UL (ref 0.05–1.2)
MONOCYTES NFR BLD: 5 % (ref 3–10)
MUCOUS THREADS URNS QL MICRO: ABNORMAL /LPF
NEUTS SEG # BLD: 3.1 K/UL (ref 1.8–8)
NEUTS SEG NFR BLD: 64 % (ref 40–73)
NITRITE UR QL STRIP.AUTO: POSITIVE
PH UR STRIP: 8 [PH] (ref 5–8)
PLATELET # BLD AUTO: 233 K/UL (ref 135–420)
PMV BLD AUTO: 10 FL (ref 9.2–11.8)
POTASSIUM SERPL-SCNC: 3.5 MMOL/L (ref 3.5–5.5)
PROT SERPL-MCNC: 9.4 G/DL (ref 6.4–8.2)
PROT UR STRIP-MCNC: NEGATIVE MG/DL
RBC # BLD AUTO: 4.38 M/UL (ref 4.2–5.3)
RBC #/AREA URNS HPF: ABNORMAL /HPF (ref 0–5)
SODIUM SERPL-SCNC: 139 MMOL/L (ref 136–145)
SP GR UR REFRACTOMETRY: 1.02 (ref 1–1.03)
UROBILINOGEN UR QL STRIP.AUTO: 1 EU/DL (ref 0.2–1)
WBC # BLD AUTO: 4.8 K/UL (ref 4.6–13.2)
WBC URNS QL MICRO: ABNORMAL /HPF (ref 0–4)

## 2019-11-09 PROCEDURE — 80053 COMPREHEN METABOLIC PANEL: CPT

## 2019-11-09 PROCEDURE — 96375 TX/PRO/DX INJ NEW DRUG ADDON: CPT

## 2019-11-09 PROCEDURE — 81001 URINALYSIS AUTO W/SCOPE: CPT

## 2019-11-09 PROCEDURE — 99283 EMERGENCY DEPT VISIT LOW MDM: CPT

## 2019-11-09 PROCEDURE — 74011250636 HC RX REV CODE- 250/636: Performed by: PHYSICIAN ASSISTANT

## 2019-11-09 PROCEDURE — 85025 COMPLETE CBC W/AUTO DIFF WBC: CPT

## 2019-11-09 PROCEDURE — 74011250637 HC RX REV CODE- 250/637: Performed by: PHYSICIAN ASSISTANT

## 2019-11-09 PROCEDURE — 96374 THER/PROPH/DIAG INJ IV PUSH: CPT

## 2019-11-09 PROCEDURE — 83690 ASSAY OF LIPASE: CPT

## 2019-11-09 RX ORDER — MORPHINE SULFATE 4 MG/ML
4 INJECTION, SOLUTION INTRAMUSCULAR; INTRAVENOUS
Status: COMPLETED | OUTPATIENT
Start: 2019-11-09 | End: 2019-11-09

## 2019-11-09 RX ORDER — PHENOL/SODIUM PHENOLATE
20 AEROSOL, SPRAY (ML) MUCOUS MEMBRANE DAILY
Qty: 30 TAB | Refills: 0 | Status: SHIPPED | OUTPATIENT
Start: 2019-11-09

## 2019-11-09 RX ORDER — SODIUM CHLORIDE 9 MG/ML
100 INJECTION, SOLUTION INTRAVENOUS CONTINUOUS
Status: DISCONTINUED | OUTPATIENT
Start: 2019-11-09 | End: 2019-11-09 | Stop reason: HOSPADM

## 2019-11-09 RX ORDER — ONDANSETRON 2 MG/ML
4 INJECTION INTRAMUSCULAR; INTRAVENOUS
Status: COMPLETED | OUTPATIENT
Start: 2019-11-09 | End: 2019-11-09

## 2019-11-09 RX ORDER — LORAZEPAM 1 MG/1
1 TABLET ORAL
Status: COMPLETED | OUTPATIENT
Start: 2019-11-09 | End: 2019-11-09

## 2019-11-09 RX ORDER — FAMOTIDINE 10 MG/ML
20 INJECTION INTRAVENOUS
Status: COMPLETED | OUTPATIENT
Start: 2019-11-09 | End: 2019-11-09

## 2019-11-09 RX ORDER — NITROFURANTOIN 25; 75 MG/1; MG/1
100 CAPSULE ORAL 2 TIMES DAILY
Qty: 6 CAP | Refills: 0 | Status: SHIPPED | OUTPATIENT
Start: 2019-11-09 | End: 2019-11-12

## 2019-11-09 RX ADMIN — SODIUM CHLORIDE 1000 ML: 900 INJECTION, SOLUTION INTRAVENOUS at 17:02

## 2019-11-09 RX ADMIN — LORAZEPAM 1 MG: 1 TABLET ORAL at 17:03

## 2019-11-09 RX ADMIN — MORPHINE SULFATE 4 MG: 4 INJECTION, SOLUTION INTRAMUSCULAR; INTRAVENOUS at 17:05

## 2019-11-09 RX ADMIN — ONDANSETRON 4 MG: 2 INJECTION INTRAMUSCULAR; INTRAVENOUS at 17:13

## 2019-11-09 RX ADMIN — FAMOTIDINE 20 MG: 10 INJECTION, SOLUTION INTRAVENOUS at 17:09

## 2019-11-09 RX ADMIN — SODIUM CHLORIDE 100 ML/HR: 900 INJECTION, SOLUTION INTRAVENOUS at 18:24

## 2019-11-09 NOTE — ED NOTES
Patient admits to having a drinking problem. She states her last drink was this morning. She states she is starting to feel shaky.

## 2019-11-09 NOTE — ED PROVIDER NOTES
EMERGENCY DEPARTMENT HISTORY AND PHYSICAL EXAM    Date: 11/9/2019  Patient Name: Sourav Bashir    History of Presenting Illness     Chief Complaint   Patient presents with    Abdominal Pain         History Provided By: Patient and Patient's medical record        Additional History (Context): Sourav Bashir is a 37 y.o. female with She has a reported history of chronic pancreatitis, her medical chart has a history of bipolar disorder anemia seizure disorder and GERD who presents with complaint of moderate to severe 8/10 left upper quadrant and epigastric abdominal pain with nausea and vomiting; patient states she drank a lot of alcohol last night but is unable to give a distinct amount. Patient reports prior history of chronic pancreatitis with hospital admissions for the same. Patient denies chest pain, shortness of breath, recent cough, fever, myalgias or chills. States her symptoms today are consistent with prior episodes of pancreatitis. Patient reports her left upper quadrant abdominal pain does radiate to the back. PCP: Scott Wolf PA-C    Current Facility-Administered Medications   Medication Dose Route Frequency Provider Last Rate Last Dose    0.9% sodium chloride infusion  100 mL/hr IntraVENous CONTINUOUS Zara Kendall PA   Stopped at 11/09/19 8796     Current Outpatient Medications   Medication Sig Dispense Refill    Omeprazole delayed release (PRILOSEC D/R) 20 mg tablet Take 1 Tab by mouth daily. 30 Tab 0    nitrofurantoin, macrocrystal-monohydrate, (MACROBID) 100 mg capsule Take 1 Cap by mouth two (2) times a day for 3 days. 6 Cap 0    SUMAtriptan (IMITREX) 100 mg tablet Take 100 mg by mouth once as needed for Migraine.  topiramate (TOPAMAX) 25 mg tablet Take 1 Tab by mouth two (2) times a day.  60 Tab 2    esomeprazole (NEXIUM) 40 mg capsule TAKE 1 CAPSULE BY MOUTH ONCE DAILY IN THE MORNING 30 MINUTES BEFORE BREAKFAST  1    lamoTRIgine (LAMICTAL) 25 mg tablet TAKE 1 TABLET BY MOUTH EVERY DAY AT BEDTIME FOR 2 WEEKS  0    mirtazapine (REMERON) 15 mg tablet TAKE 1 TABLET BY MOUTH EVERY DAY AT BEDTIME  2    naltrexone (DEPADE) 50 mg tablet TAKE 1 TABLET BY MOUTH ONCE DAILY  1    propranolol (INDERAL) 20 mg tablet Take  by mouth three (3) times daily.  promethazine (PHENERGAN) 25 mg tablet Take 1 Tab by mouth every six (6) hours as needed for Nausea. Caution: This medication may make you drowsy. Avoid driving while under the influence of this medication. 12 Tab 0    gabapentin (NEURONTIN) 600 mg tablet Take  by mouth three (3) times daily.  carBAMazepine (TEGRETOL) 200 mg tablet Take 200 mg by mouth.  folic acid (FOLVITE) 1 mg tablet Take 1 mg by mouth.  QUEtiapine (SEROQUEL) 25 mg tablet Take 100 mg by mouth nightly as needed.  traZODone (DESYREL) 100 mg tablet Take 100 mg by mouth nightly.  hydrOXYzine HCl (ATARAX) 50 mg tablet Take 50 mg by mouth four (4) times daily.          Past History     Past Medical History:  Past Medical History:   Diagnosis Date    Adverse effect of anesthesia     Anxiety     Endometriosis     EtOH dependence ([facility] [address]     Patient reports daily Vodka consumption for years with withdrawal symptoms in the past    GERD (gastroesophageal reflux disease)     Ill-defined condition     meningitis    Liver mass 2016    small liver mass found per pt during last hospital stay   [place] Pancreatitis     Peptic ulcer     Renal cyst     Routine lab draw     Patient reports that vein finder may be required    Seizures ([address]     due to alcohol withdrawal    Severe dysplasia of cervix     Urine frequency     Withdrawal symptoms, alcohol ([address]        Past Surgical History:  Past Surgical History:   Procedure Laterality Date    COLPOSCOPY      x 2    HX CHOLECYSTECTOMY      HX GYN      colposcopy d and c    HX LEEP PROCEDURE      HX OTHER SURGICAL      LIVER SURGERY PROCEDURE UNLISTED  2004    \"Half of liver removed\"        Family History:  Family History   Problem Relation Age of Onset    Cancer Mother         Cervical and uterine cancer at age 39    Hypertension Mother    Josephine.People COPD Mother     Hypertension Father     Kidney Disease Maternal Aunt     Kidney Disease Maternal Uncle     Hypertension Maternal Grandmother     Other Other         Gallbladder problems and kidnsy stones problems on mom side       Social History:  Social History     Tobacco Use    Smoking status: Current Every Day Smoker     Packs/day: 1.00     Years: 22.00     Pack years: 22.00    Smokeless tobacco: Never Used   Substance Use Topics    Alcohol use: Yes     Comment: daily liquor moderate amount    Drug use: Yes     Types: Marijuana     Comment: Last used on 10/12/19       Allergies: Allergies   Allergen Reactions    Seafood Anaphylaxis     Pt is ok with iodine contrast. Patient is allergic to shellfish.  Shellfish Derived Anaphylaxis    Codeine Hives    Penicillins Hives         Review of Systems   Review of Systems  Review of Systems   Constitutional: Negative for fatigue and fever. HENT: Negative for congestion. Respiratory: Negative for cough and shortness of breath. Cardiovascular: Negative for chest pain. Gastrointestinal: Positive for left upper quadrant abdominal pain with radiation of pain to the back . Patient also reports nausea and vomiting. No diarrhea. Genitourinary: Negative for difficulty urinating and dysuria. Musculoskeletal: Negative joint pain, joint swelling, recent injury. Skin: Negative for wound. Neurological: Negative for dizziness and headaches. All other systems reviewed and are negative. All Other Systems Negative  Physical Exam     Vitals:    11/09/19 1616   BP: 130/88   Pulse: 73   Resp: 12   Temp: 97.9 °F (36.6 °C)   SpO2: 100%   Weight: 79.4 kg (175 lb)   Height: 5' 5\" (1.651 m)     Physical Exam     Constitutional: Pt is oriented to person, place, and time.  Pt appears well-developed and well-nourished. HENT:   Head: Normocephalic and atraumatic. Mouth/Throat: Oropharynx is clear and moist.   Eyes: Pupils are equal, round, and reactive to light. Neck: Normal range of motion. Neck supple. Cardiovascular: Normal rate, regular rhythm and normal heart sounds. No murmur heard. Pulmonary/Chest: Effort normal and breath sounds normal. No respiratory distress. No wheezes or rales. Abdominal: Soft. No distension and no mass. There is mild TTP LUQ and epigastric regions. No Weathers sign, Rovsing sign, so as or obturators signs. No rebound or guarding. Musculoskeletal: Normal range of motion. No edema or deformity. Neurological: Pt is alert and oriented to person, place, and time   Skin: Skin is warm and dry. Psychiatric: Pt has a normal mood and affect;  behavior is normal. Judgment and thought content normal.           Diagnostic Study Results     Labs -     Recent Results (from the past 12 hour(s))   METABOLIC PANEL, COMPREHENSIVE    Collection Time: 11/09/19  4:47 PM   Result Value Ref Range    Sodium 139 136 - 145 mmol/L    Potassium 3.5 3.5 - 5.5 mmol/L    Chloride 102 100 - 111 mmol/L    CO2 28 21 - 32 mmol/L    Anion gap 9 3.0 - 18 mmol/L    Glucose 112 (H) 74 - 99 mg/dL    BUN 11 7.0 - 18 MG/DL    Creatinine 0.77 0.6 - 1.3 MG/DL    BUN/Creatinine ratio 14 12 - 20      GFR est AA >60 >60 ml/min/1.73m2    GFR est non-AA >60 >60 ml/min/1.73m2    Calcium 9.3 8.5 - 10.1 MG/DL    Bilirubin, total 0.5 0.2 - 1.0 MG/DL    ALT (SGPT) 44 13 - 56 U/L    AST (SGOT) 69 (H) 10 - 38 U/L    Alk.  phosphatase 128 (H) 45 - 117 U/L    Protein, total 9.4 (H) 6.4 - 8.2 g/dL    Albumin 4.3 3.4 - 5.0 g/dL    Globulin 5.1 (H) 2.0 - 4.0 g/dL    A-G Ratio 0.8 0.8 - 1.7     LIPASE    Collection Time: 11/09/19  4:47 PM   Result Value Ref Range    Lipase 199 73 - 393 U/L   CBC WITH AUTOMATED DIFF    Collection Time: 11/09/19  4:47 PM   Result Value Ref Range    WBC 4.8 4.6 - 13.2 K/uL    RBC 4. 38 4.20 - 5.30 M/uL    HGB 13.3 12.0 - 16.0 g/dL    HCT 38.1 35.0 - 45.0 %    MCV 87.0 74.0 - 97.0 FL    MCH 30.4 24.0 - 34.0 PG    MCHC 34.9 31.0 - 37.0 g/dL    RDW 13.8 11.6 - 14.5 %    PLATELET 220 356 - 934 K/uL    MPV 10.0 9.2 - 11.8 FL    NEUTROPHILS 64 40 - 73 %    LYMPHOCYTES 28 21 - 52 %    MONOCYTES 5 3 - 10 %    EOSINOPHILS 3 0 - 5 %    BASOPHILS 0 0 - 2 %    ABS. NEUTROPHILS 3.1 1.8 - 8.0 K/UL    ABS. LYMPHOCYTES 1.3 0.9 - 3.6 K/UL    ABS. MONOCYTES 0.2 0.05 - 1.2 K/UL    ABS. EOSINOPHILS 0.1 0.0 - 0.4 K/UL    ABS. BASOPHILS 0.0 0.0 - 0.1 K/UL    DF AUTOMATED     URINALYSIS W/ RFLX MICROSCOPIC    Collection Time: 11/09/19  6:25 PM   Result Value Ref Range    Color DARK YELLOW      Appearance CLEAR      Specific gravity 1.024 1.005 - 1.030      pH (UA) 8.0 5.0 - 8.0      Protein NEGATIVE  NEG mg/dL    Glucose NEGATIVE  NEG mg/dL    Ketone NEGATIVE  NEG mg/dL    Bilirubin NEGATIVE  NEG      Blood NEGATIVE  NEG      Urobilinogen 1.0 0.2 - 1.0 EU/dL    Nitrites POSITIVE (A) NEG      Leukocyte Esterase TRACE (A) NEG         Radiologic Studies -   No orders to display     CT Results  (Last 48 hours)    None        CXR Results  (Last 48 hours)    None            Medical Decision Making   I am the first provider for this patient. I reviewed the vital signs, available nursing notes, past medical history, past surgical history, family history and social history. Vital Signs-Reviewed the patient's vital signs. Comparison:    Records Reviewed: Nursing Notes, Old Medical Records and Previous Radiology Studies    Procedures:  Procedures    Provider Notes (Medical Decision Making):   Patient has self-reported history of chronic pancreatitis. Review of the patient's EMR shows no mention of chronic pancreatitis, all CTA of the abdomen is and pelvis are negative for pancreatitis from prior history. There is suspicion that the patient is malingering for pain relievers and Ativan.   Patient has had gastritis in the past her symptoms reported could likely be gastritis. Lipase is normal other labs are within normal limits. Patient put on Macrobid for presence of nitrates and urine. MED RECONCILIATION:  Current Facility-Administered Medications   Medication Dose Route Frequency    0.9% sodium chloride infusion  100 mL/hr IntraVENous CONTINUOUS     Current Outpatient Medications   Medication Sig    Omeprazole delayed release (PRILOSEC D/R) 20 mg tablet Take 1 Tab by mouth daily.  nitrofurantoin, macrocrystal-monohydrate, (MACROBID) 100 mg capsule Take 1 Cap by mouth two (2) times a day for 3 days.  SUMAtriptan (IMITREX) 100 mg tablet Take 100 mg by mouth once as needed for Migraine.  topiramate (TOPAMAX) 25 mg tablet Take 1 Tab by mouth two (2) times a day.  esomeprazole (NEXIUM) 40 mg capsule TAKE 1 CAPSULE BY MOUTH ONCE DAILY IN THE MORNING 30 MINUTES BEFORE BREAKFAST    lamoTRIgine (LAMICTAL) 25 mg tablet TAKE 1 TABLET BY MOUTH EVERY DAY AT BEDTIME FOR 2 WEEKS    mirtazapine (REMERON) 15 mg tablet TAKE 1 TABLET BY MOUTH EVERY DAY AT BEDTIME    naltrexone (DEPADE) 50 mg tablet TAKE 1 TABLET BY MOUTH ONCE DAILY    propranolol (INDERAL) 20 mg tablet Take  by mouth three (3) times daily.  promethazine (PHENERGAN) 25 mg tablet Take 1 Tab by mouth every six (6) hours as needed for Nausea. Caution: This medication may make you drowsy. Avoid driving while under the influence of this medication.  gabapentin (NEURONTIN) 600 mg tablet Take  by mouth three (3) times daily.  carBAMazepine (TEGRETOL) 200 mg tablet Take 200 mg by mouth.  folic acid (FOLVITE) 1 mg tablet Take 1 mg by mouth.  QUEtiapine (SEROQUEL) 25 mg tablet Take 100 mg by mouth nightly as needed.  traZODone (DESYREL) 100 mg tablet Take 100 mg by mouth nightly.  hydrOXYzine HCl (ATARAX) 50 mg tablet Take 50 mg by mouth four (4) times daily. Disposition:  Home    DISCHARGE NOTE:     Pt has been reexamined.   Patient has no new complaints, changes, or physical findings. Care plan outlined and precautions discussed. Results of labs and exam were reviewed with the patient. All medications were reviewed with the patient; will d/c home with macrobid. All of pt's questions and concerns were addressed. Patient was instructed and agrees to follow up with primary care and gastroenterology, as well as to return to the ED upon further deterioration. Patient is ready to go home. Follow-up Information     Follow up With Specialties Details Why Contact Ata Manzano PA-C Physician Assistant   Irma  [address] [facility] EMERGENCY DEPT Emergency Medicine  If symptoms worsen [address] [place] [id number]  [phone]    Gastroenterology  Schedule an appointment as soon as possible for a visit            Current Discharge Medication List      START taking these medications    Details   Omeprazole delayed release (PRILOSEC D/R) 20 mg tablet Take 1 Tab by mouth daily. Qty: 30 Tab, Refills: 0      nitrofurantoin, macrocrystal-monohydrate, (MACROBID) 100 mg capsule Take 1 Cap by mouth two (2) times a day for 3 days. Qty: 6 Cap, Refills: 0         STOP taking these medications       omeprazole (PRILOSEC) 20 mg capsule Comments:   Reason for Stopping:                   Diagnosis     Clinical Impression:   1. Abdominal pain, LUQ (left upper quadrant)    2.  Malingering

## 2019-11-09 NOTE — ED NOTES
Patient changed into gown, call bell within reach. Patient given pillow and blanket, lights were shut off. Patient states she is comfortable.

## 2019-11-11 ENCOUNTER — TELEPHONE (OUTPATIENT)
Dept: FAMILY MEDICINE CLINIC | Age: 39
End: 2019-11-11

## 2019-11-11 NOTE — TELEPHONE ENCOUNTER
Patient is requesting a call back for the status of her inpatient treatment and the her foot surgery. Patient stated that provider Kemar Salinas is friends/former classmates with an employee at her podiatrist office and they have been in contact in regards to her care. Please advise.

## 2019-11-15 NOTE — TELEPHONE ENCOUNTER
When we last spoke at her appointment, she was given multiple numbers to call for evaluations and based on those evaluations they would decide if she needs inpatient or outpatient treatment. Those are the options her insurance provided to us. I have reached out to ortho and am waiting for a call back.

## 2019-11-27 RX ORDER — GABAPENTIN 600 MG/1
TABLET ORAL 3 TIMES DAILY
OUTPATIENT
Start: 2019-11-27

## 2019-11-29 ENCOUNTER — HOSPITAL ENCOUNTER (EMERGENCY)
Age: 39
Discharge: HOME | End: 2019-11-29
Attending: EMERGENCY MEDICINE
Payer: MEDICAID

## 2019-11-29 VITALS
OXYGEN SATURATION: 96 % | RESPIRATION RATE: 20 BRPM | HEART RATE: 61 BPM | TEMPERATURE: 97.7 F | SYSTOLIC BLOOD PRESSURE: 101 MMHG | DIASTOLIC BLOOD PRESSURE: 60 MMHG

## 2019-11-29 DIAGNOSIS — F10.930 ALCOHOL WITHDRAWAL SYNDROME WITHOUT COMPLICATION (HCC): ICD-10-CM

## 2019-11-29 DIAGNOSIS — R11.2 NON-INTRACTABLE VOMITING WITH NAUSEA, UNSPECIFIED VOMITING TYPE: Primary | ICD-10-CM

## 2019-11-29 LAB
ALBUMIN SERPL-MCNC: 3.9 G/DL (ref 3.4–5)
ALBUMIN/GLOB SERPL: 0.8 {RATIO} (ref 0.8–1.7)
ALP SERPL-CCNC: 117 U/L (ref 45–117)
ALT SERPL-CCNC: 27 U/L (ref 13–56)
AMPHET UR QL SCN: NEGATIVE
ANION GAP SERPL CALC-SCNC: 6 MMOL/L (ref 3–18)
AST SERPL-CCNC: 42 U/L (ref 10–38)
BARBITURATES UR QL SCN: NEGATIVE
BASOPHILS # BLD: 0 K/UL (ref 0–0.1)
BASOPHILS NFR BLD: 0 % (ref 0–2)
BENZODIAZ UR QL: POSITIVE
BILIRUB SERPL-MCNC: 0.4 MG/DL (ref 0.2–1)
BUN SERPL-MCNC: 16 MG/DL (ref 7–18)
BUN/CREAT SERPL: 15 (ref 12–20)
CALCIUM SERPL-MCNC: 9.2 MG/DL (ref 8.5–10.1)
CANNABINOIDS UR QL SCN: NEGATIVE
CHLORIDE SERPL-SCNC: 107 MMOL/L (ref 100–111)
CO2 SERPL-SCNC: 25 MMOL/L (ref 21–32)
COCAINE UR QL SCN: NEGATIVE
CREAT SERPL-MCNC: 1.05 MG/DL (ref 0.6–1.3)
DIFFERENTIAL METHOD BLD: NORMAL
EOSINOPHIL # BLD: 0.2 K/UL (ref 0–0.4)
EOSINOPHIL NFR BLD: 3 % (ref 0–5)
ERYTHROCYTE [DISTWIDTH] IN BLOOD BY AUTOMATED COUNT: 14.2 % (ref 11.6–14.5)
ETHANOL SERPL-MCNC: <3 MG/DL (ref 0–3)
GLOBULIN SER CALC-MCNC: 5.1 G/DL (ref 2–4)
GLUCOSE SERPL-MCNC: 100 MG/DL (ref 74–99)
HCG SERPL QL: NEGATIVE
HCT VFR BLD AUTO: 37.9 % (ref 35–45)
HDSCOM,HDSCOM: ABNORMAL
HGB BLD-MCNC: 13.2 G/DL (ref 12–16)
LYMPHOCYTES # BLD: 2 K/UL (ref 0.9–3.6)
LYMPHOCYTES NFR BLD: 40 % (ref 21–52)
MCH RBC QN AUTO: 30.3 PG (ref 24–34)
MCHC RBC AUTO-ENTMCNC: 34.8 G/DL (ref 31–37)
MCV RBC AUTO: 86.9 FL (ref 74–97)
METHADONE UR QL: NEGATIVE
MONOCYTES # BLD: 0.3 K/UL (ref 0.05–1.2)
MONOCYTES NFR BLD: 6 % (ref 3–10)
NEUTS SEG # BLD: 2.6 K/UL (ref 1.8–8)
NEUTS SEG NFR BLD: 51 % (ref 40–73)
OPIATES UR QL: NEGATIVE
PCP UR QL: NEGATIVE
PLATELET # BLD AUTO: 266 K/UL (ref 135–420)
PMV BLD AUTO: 9.9 FL (ref 9.2–11.8)
POTASSIUM SERPL-SCNC: 4.4 MMOL/L (ref 3.5–5.5)
PROT SERPL-MCNC: 9 G/DL (ref 6.4–8.2)
RBC # BLD AUTO: 4.36 M/UL (ref 4.2–5.3)
SODIUM SERPL-SCNC: 138 MMOL/L (ref 136–145)
WBC # BLD AUTO: 5.1 K/UL (ref 4.6–13.2)

## 2019-11-29 PROCEDURE — 80053 COMPREHEN METABOLIC PANEL: CPT

## 2019-11-29 PROCEDURE — 84703 CHORIONIC GONADOTROPIN ASSAY: CPT

## 2019-11-29 PROCEDURE — 96361 HYDRATE IV INFUSION ADD-ON: CPT

## 2019-11-29 PROCEDURE — 74011250636 HC RX REV CODE- 250/636: Performed by: PHYSICIAN ASSISTANT

## 2019-11-29 PROCEDURE — 99283 EMERGENCY DEPT VISIT LOW MDM: CPT

## 2019-11-29 PROCEDURE — 74011250637 HC RX REV CODE- 250/637: Performed by: PHYSICIAN ASSISTANT

## 2019-11-29 PROCEDURE — 87086 URINE CULTURE/COLONY COUNT: CPT

## 2019-11-29 PROCEDURE — 85025 COMPLETE CBC W/AUTO DIFF WBC: CPT

## 2019-11-29 PROCEDURE — 96375 TX/PRO/DX INJ NEW DRUG ADDON: CPT

## 2019-11-29 PROCEDURE — 96365 THER/PROPH/DIAG IV INF INIT: CPT

## 2019-11-29 PROCEDURE — 80307 DRUG TEST PRSMV CHEM ANLYZR: CPT

## 2019-11-29 PROCEDURE — 87186 SC STD MICRODIL/AGAR DIL: CPT

## 2019-11-29 PROCEDURE — 87077 CULTURE AEROBIC IDENTIFY: CPT

## 2019-11-29 PROCEDURE — 74011000250 HC RX REV CODE- 250: Performed by: PHYSICIAN ASSISTANT

## 2019-11-29 RX ORDER — CHLORDIAZEPOXIDE HYDROCHLORIDE 5 MG/1
5 CAPSULE, GELATIN COATED ORAL
Status: COMPLETED | OUTPATIENT
Start: 2019-11-29 | End: 2019-11-29

## 2019-11-29 RX ORDER — KETOROLAC TROMETHAMINE 15 MG/ML
15 INJECTION, SOLUTION INTRAMUSCULAR; INTRAVENOUS
Status: COMPLETED | OUTPATIENT
Start: 2019-11-29 | End: 2019-11-29

## 2019-11-29 RX ORDER — ONDANSETRON 4 MG/1
TABLET, ORALLY DISINTEGRATING ORAL
Qty: 10 TAB | Refills: 0 | Status: SHIPPED | OUTPATIENT
Start: 2019-11-29 | End: 2019-12-18

## 2019-11-29 RX ORDER — ONDANSETRON 2 MG/ML
4 INJECTION INTRAMUSCULAR; INTRAVENOUS
Status: COMPLETED | OUTPATIENT
Start: 2019-11-29 | End: 2019-11-29

## 2019-11-29 RX ORDER — CHLORDIAZEPOXIDE HYDROCHLORIDE 5 MG/1
5 CAPSULE, GELATIN COATED ORAL
Qty: 12 CAP | Refills: 0 | Status: SHIPPED | OUTPATIENT
Start: 2019-11-29 | End: 2019-12-18

## 2019-11-29 RX ADMIN — CHLORDIAZEPOXIDE HYDROCHLORIDE 5 MG: 5 CAPSULE ORAL at 04:03

## 2019-11-29 RX ADMIN — THIAMINE HYDROCHLORIDE: 100 INJECTION, SOLUTION INTRAMUSCULAR; INTRAVENOUS at 01:57

## 2019-11-29 RX ADMIN — ONDANSETRON 4 MG: 2 INJECTION INTRAMUSCULAR; INTRAVENOUS at 00:54

## 2019-11-29 RX ADMIN — SODIUM CHLORIDE 1000 ML: 900 INJECTION, SOLUTION INTRAVENOUS at 01:14

## 2019-11-29 RX ADMIN — KETOROLAC TROMETHAMINE 15 MG: 15 INJECTION, SOLUTION INTRAMUSCULAR; INTRAVENOUS at 03:33

## 2019-11-29 NOTE — DISCHARGE INSTRUCTIONS
Patient Education      Take medication as prescribed. Follow-up with your primary care physician within 2 days for reassessment. Bring the results from this visit with you for their review. Return to the ED immediately for any new, worsening, or persistent symptoms, including fever, abdominal pain, or any other medical concerns. Patient Education        Learning About Alcohol Withdrawal  What is alcohol withdrawal?    If you drink alcohol regularly (more than a few drinks on most days) and then suddenly stop or cut down, you may go through some physical and emotional problems while the alcohol clears out of your system. This is called withdrawal. Clearing the alcohol from your body is called detoxification, or detox. What are the symptoms? Symptoms of alcohol withdrawal may start as soon as 4 to 12 hours after you stop drinking. Or they may not start until several days after the last drink. Mild symptoms include:  · Nausea. · Sweating. · Shakiness. · Diarrhea. · Intense worry. · Disturbed sleep. · Headache. More severe symptoms include:  · Vomiting or belly pain. · Being confused, upset, and irritable. · Changed sensations. You might feel things on your body that aren't really there. Or you may see or hear things that aren't there. · Trembling. · Being short of breath or having pain in your chest.  · Having seizures. Symptoms may peak within a few days. Mild symptoms can last for a few weeks. If your symptoms are severe, you'll need to see a doctor. What is the treatment for alcohol withdrawal?  Most people may be able to cut down or stop drinking with only mild withdrawal. They can stay safe by simply resting, drinking lots of fluids, and eating healthy foods. But people who drink large amounts of alcohol or are at risk for severe withdrawal symptoms should not try to detox at home unless they work closely with a doctor to manage it.  A person can die of severe alcohol withdrawal.  Before you stop drinking, talk to your doctor about how you plan to stop. Be completely honest about how much you've been drinking. Your doctor will figure out if you need to detox in a medical center. You may get medicine to treat the symptoms whether you are at home or in a medical center. Medicine that treats seizures can also help. Your doctor will explain what types of medicine might help you. You may start with a high dose and then take smaller amounts over several days. There's also medicine that can help you avoid alcohol while you recover. How can you manage your withdrawal and recovery? Here are a few tips that can help you to not start drinking again. · Make sure there's no alcohol in the house. This includes drinks as well as liquid medicines, rubbing alcohol, and certain flavorings like vanilla extract. · Try not to hang out with people you used to drink with. · Don't go it alone. Spend time with people who support the changes you are making in your life. This includes asking for advice and help from people who have stopped drinking. You might also try mutual support groups such as Alcoholics Anonymous. · Drink lots of fluids. · Eat snacks such as fruit, cheese and crackers, and pretzels. High-carbohydrate foods may help reduce the craving for alcohol. What happens after withdrawal?  It can be hard to stop drinking. But after you clear the alcohol from your system, you can start the next, healthier part of your life. After detox, you will focus on staying alcohol-free. You can learn skills that you can use to stay abstinent (or sober) as you recover. Finding new ways to deal with life's challenges, without drinking, takes time and effort. Recovery is a long-term process. It's not something you can achieve in a few weeks. Most people get some type of therapy, such as group counseling. You also may need medicine to help you stay sober. Treatment doesn't focus on alcohol use alone.  It may address other parts of your life, like your relationships, work, medical problems, and home life. Treatment, support, patience, and commitment will help you make the changes you need to live a [place] life without alcohol. You may find, over time, that the process gets easier, life becomes more joyous, and your connections to others becomes more rewarding. Where can you find help? Behavioral [facility] Treatment Services . This service from the [facility] and [place] [address] can help you find local alcohol treatment services. Search online at Concert Window. [place] Pediatricsa.gov or call 6-872-581-Moximed (516 682 829), or TDD 1-291.936.6457. Where can you learn more? Go to http://willyCellroxyenny.info/. Enter A011 in the search box to learn more about \"Learning About Alcohol Withdrawal.\"  Current as of: February 5, 2019  Content Version: 12.2  © 7777-5801 [employer]. Care instructions adapted under license by [employer] (which disclaims liability or warranty for this information). If you have questions about a medical condition or this instruction, always ask your healthcare professional. [place] [address] any warranty or liability for your use of this information. Nausea and Vomiting: Care Instructions  Your Care Instructions    When you are nauseated, you may feel weak and sweaty and notice a lot of saliva in your mouth. Nausea often leads to vomiting. Most of the time you do not need to worry about nausea and vomiting, but they can be signs of other illnesses. Two common causes of nausea and vomiting are stomach flu and food poisoning. Nausea and vomiting from viral stomach flu will usually start to improve within 24 hours. Nausea and vomiting from food poisoning may last from 12 to 48 hours. The doctor has checked you carefully, but problems can develop later.  If you notice any problems or new symptoms, get medical treatment right away.  Follow-up care is a key part of your treatment and safety. Be sure to make and go to all appointments, and call your doctor if you are having problems. It's also a good idea to know your test results and keep a list of the medicines you take. How can you care for yourself at home? · To prevent dehydration, drink plenty of fluids, enough so that your urine is light yellow or clear like water. Choose water and other caffeine-free clear liquids until you feel better. If you have kidney, heart, or liver disease and have to limit fluids, talk with your doctor before you increase the amount of fluids you drink. · Rest in bed until you feel better. · When you are able to eat, try clear soups, mild foods, and liquids until all symptoms are gone for 12 to 48 hours. Other good choices include dry toast, crackers, cooked cereal, and gelatin dessert, such as Jell-O. When should you call for help? Call 911 anytime you think you may need emergency care. For example, call if:    · You passed out (lost consciousness).    Call your doctor now or seek immediate medical care if:    · You have symptoms of dehydration, such as:  ? Dry eyes and a dry mouth. ? Passing only a little dark urine. ? Feeling thirstier than usual.     · You have new or worsening belly pain.     · You have a new or higher fever.     · You vomit blood or what looks like coffee grounds.    Watch closely for changes in your health, and be sure to contact your doctor if:    · You have ongoing nausea and vomiting.     · Your vomiting is getting worse.     · Your vomiting lasts longer than 2 days.     · You are not getting better as expected. Where can you learn more? Go to http://willy-yenny.info/. Enter 25 [id number] in the search box to learn more about \"Nausea and Vomiting: Care Instructions. \"  Current as of: June 26, 2019  Content Version: 12.2  © 7383-1032 [employer].  Care instructions adapted under license by [employer] (which disclaims liability or warranty for this information). If you have questions about a medical condition or this instruction, always ask your healthcare professional. [address] any warranty or liability for your use of this information.

## 2019-11-29 NOTE — ED PROVIDER NOTES
EMERGENCY DEPARTMENT HISTORY AND PHYSICAL EXAM    12:27 AM      Date: 11/29/2019  Patient Name: Levon Fortune    History of Presenting Illness     No chief complaint on file. History Provided By: Patient    Additional History (Context): Levon Fortune is a 37 y.o. female with hx of alcohol abuse, pancreatitis who presents with c/o generalized pain associated with n/v x 3 days. Pt notes she has been detoxing from alcohol since last Friday. Notes she was admitted to  Psych and was discharged 2 days ago. Notes she is living in a \"sober house\". Denies fever/chills, abdominal pain, dysuria, hematuria, diarrhea. Denies seizures or tremors, visual or auditory hallucinations. Annmarie Milton PCP: Pura Ag PA-C    Current Outpatient Medications   Medication Sig Dispense Refill    ondansetron (ZOFRAN ODT) 4 mg disintegrating tablet Take 1-2 tablets every 6-8 hours as needed for nausea and vomiting. 10 Tab 0    chlordiazePOXIDE (LIBRIUM) 5 mg capsule Take 1 Cap by mouth three (3) times daily as needed for Anxiety. Max Daily Amount: 15 mg. 12 Cap 0    Omeprazole delayed release (PRILOSEC D/R) 20 mg tablet Take 1 Tab by mouth daily. 30 Tab 0    SUMAtriptan (IMITREX) 100 mg tablet Take 100 mg by mouth once as needed for Migraine.  topiramate (TOPAMAX) 25 mg tablet Take 1 Tab by mouth two (2) times a day. 60 Tab 2    esomeprazole (NEXIUM) 40 mg capsule TAKE 1 CAPSULE BY MOUTH ONCE DAILY IN THE MORNING 30 MINUTES BEFORE BREAKFAST  1    lamoTRIgine (LAMICTAL) 25 mg tablet TAKE 1 TABLET BY MOUTH EVERY DAY AT BEDTIME FOR 2 WEEKS  0    mirtazapine (REMERON) 15 mg tablet TAKE 1 TABLET BY MOUTH EVERY DAY AT BEDTIME  2    naltrexone (DEPADE) 50 mg tablet TAKE 1 TABLET BY MOUTH ONCE DAILY  1    propranolol (INDERAL) 20 mg tablet Take  by mouth three (3) times daily.  gabapentin (NEURONTIN) 600 mg tablet Take  by mouth three (3) times daily.  carBAMazepine (TEGRETOL) 200 mg tablet Take 200 mg by mouth.  folic acid (FOLVITE) 1 mg tablet Take 1 mg by mouth.  QUEtiapine (SEROQUEL) 25 mg tablet Take 100 mg by mouth nightly as needed.  traZODone (DESYREL) 100 mg tablet Take 100 mg by mouth nightly.  hydrOXYzine HCl (ATARAX) 50 mg tablet Take 50 mg by mouth four (4) times daily.          Past History     Past Medical History:  Past Medical History:   Diagnosis Date    Adverse effect of anesthesia     Anxiety     Endometriosis     EtOH dependence ([address]     Patient reports daily Vodka consumption for years with withdrawal symptoms in the past    GERD (gastroesophageal reflux disease)     Ill-defined condition     meningitis    Liver mass 2016    small liver mass found per pt during last hospital stay   [place] Pancreatitis     Peptic ulcer     Renal cyst     Routine lab draw     Patient reports that vein finder may be required    Seizures ([facility] [address]     due to alcohol withdrawal    Severe dysplasia of cervix     Urine frequency     Withdrawal symptoms, alcohol (HCC)        Past Surgical History:  Past Surgical History:   Procedure Laterality Date    COLPOSCOPY      x 2    HX CHOLECYSTECTOMY      HX GYN      colposcopy d and c    HX LEEP PROCEDURE      HX OTHER SURGICAL      LIVER SURGERY PROCEDURE UNLISTED  2004    \"Half of liver removed\"        Family History:  Family History   Problem Relation Age of Onset    Cancer Mother         Cervical and uterine cancer at age 39    Hypertension Mother    Wallace COPD Mother     Hypertension Father     Kidney Disease Maternal Aunt     Kidney Disease Maternal Uncle     Hypertension Maternal Grandmother     Other Other         Gallbladder problems and kidnsy stones problems on mom side       Social History:  Social History     Tobacco Use    Smoking status: Current Every Day Smoker     Packs/day: 1.00     Years: 22.00     Pack years: 22.00    Smokeless tobacco: Never Used   Substance Use Topics    Alcohol use: Yes     Comment: daily liquor moderate amount    Drug use: Yes     Types: Marijuana     Comment: Last used on 10/12/19       Allergies: Allergies   Allergen Reactions    Seafood Anaphylaxis     Pt is ok with iodine contrast. Patient is allergic to shellfish.  Shellfish Derived Anaphylaxis    Codeine Hives    Penicillins Hives         Review of Systems       Review of Systems   Constitutional: Negative for chills and fever. Respiratory: Negative for shortness of breath. Cardiovascular: Negative for chest pain. Gastrointestinal: Positive for nausea and vomiting. Negative for abdominal pain. Musculoskeletal: Positive for myalgias. Skin: Negative for rash. Neurological: Negative for weakness. All other systems reviewed and are negative. Physical Exam     Visit Vitals  /60   Pulse 61   Temp 97.7 °F (36.5 °C)   Resp 20   SpO2 96%         Physical Exam  Vitals signs and nursing note reviewed. Constitutional:       General: She is not in acute distress. Appearance: She is well-developed. She is not diaphoretic. HENT:      Head: Normocephalic and atraumatic. Neck:      Musculoskeletal: Normal range of motion and neck supple. Cardiovascular:      Rate and Rhythm: Normal rate and regular rhythm. Heart sounds: Normal heart sounds. No murmur. No friction rub. No gallop. Pulmonary:      Effort: Pulmonary effort is normal. No respiratory distress. Breath sounds: Normal breath sounds. No wheezing or rales. Abdominal:      General: There is no distension. Palpations: There is no mass. Tenderness: There is no tenderness. There is no guarding. Hernia: No hernia is present. Musculoskeletal: Normal range of motion. Skin:     General: Skin is warm. Findings: No rash. Neurological:      General: No focal deficit present. Mental Status: She is alert and oriented to person, place, and time. Cranial Nerves: Cranial nerves are intact.       Comments: No  Tremors Diagnostic Study Results     Labs -  Recent Results (from the past 12 hour(s))   DRUG SCREEN, URINE    Collection Time: 11/29/19 12:36 AM   Result Value Ref Range    BENZODIAZEPINES POSITIVE (A) NEG      BARBITURATES NEGATIVE  NEG      THC (TH-CANNABINOL) NEGATIVE  NEG      OPIATES NEGATIVE  NEG      PCP(PHENCYCLIDINE) NEGATIVE  NEG      COCAINE NEGATIVE  NEG      AMPHETAMINES NEGATIVE  NEG      METHADONE NEGATIVE  NEG      HDSCOM (NOTE)    CBC WITH AUTOMATED DIFF    Collection Time: 11/29/19  1:11 AM   Result Value Ref Range    WBC 5.1 4.6 - 13.2 K/uL    RBC 4.36 4.20 - 5.30 M/uL    HGB 13.2 12.0 - 16.0 g/dL    HCT 37.9 35.0 - 45.0 %    MCV 86.9 74.0 - 97.0 FL    MCH 30.3 24.0 - 34.0 PG    MCHC 34.8 31.0 - 37.0 g/dL    RDW 14.2 11.6 - 14.5 %    PLATELET 313 985 - 359 K/uL    MPV 9.9 9.2 - 11.8 FL    NEUTROPHILS 51 40 - 73 %    LYMPHOCYTES 40 21 - 52 %    MONOCYTES 6 3 - 10 %    EOSINOPHILS 3 0 - 5 %    BASOPHILS 0 0 - 2 %    ABS. NEUTROPHILS 2.6 1.8 - 8.0 K/UL    ABS. LYMPHOCYTES 2.0 0.9 - 3.6 K/UL    ABS. MONOCYTES 0.3 0.05 - 1.2 K/UL    ABS. EOSINOPHILS 0.2 0.0 - 0.4 K/UL    ABS. BASOPHILS 0.0 0.0 - 0.1 K/UL    DF AUTOMATED     METABOLIC PANEL, COMPREHENSIVE    Collection Time: 11/29/19  1:11 AM   Result Value Ref Range    Sodium 138 136 - 145 mmol/L    Potassium 4.4 3.5 - 5.5 mmol/L    Chloride 107 100 - 111 mmol/L    CO2 25 21 - 32 mmol/L    Anion gap 6 3.0 - 18 mmol/L    Glucose 100 (H) 74 - 99 mg/dL    BUN 16 7.0 - 18 MG/DL    Creatinine 1.05 0.6 - 1.3 MG/DL    BUN/Creatinine ratio 15 12 - 20      GFR est AA >60 >60 ml/min/1.73m2    GFR est non-AA 58 (L) >60 ml/min/1.73m2    Calcium 9.2 8.5 - 10.1 MG/DL    Bilirubin, total 0.4 0.2 - 1.0 MG/DL    ALT (SGPT) 27 13 - 56 U/L    AST (SGOT) 42 (H) 10 - 38 U/L    Alk.  phosphatase 117 45 - 117 U/L    Protein, total 9.0 (H) 6.4 - 8.2 g/dL    Albumin 3.9 3.4 - 5.0 g/dL    Globulin 5.1 (H) 2.0 - 4.0 g/dL    A-G Ratio 0.8 0.8 - 1.7     ETHYL ALCOHOL Collection Time: 11/29/19  1:11 AM   Result Value Ref Range    ALCOHOL(ETHYL),SERUM <3 0 - 3 MG/DL   HCG QL SERUM    Collection Time: 11/29/19  1:11 AM   Result Value Ref Range    HCG, Ql. NEGATIVE  NEG         Radiologic Studies -   No orders to display         Medical Decision Making   I am the first provider for this patient. I reviewed the vital signs, available nursing notes, past medical history, past surgical history, family history and social history. Vital Signs-Reviewed the patient's vital signs. Records Reviewed: Nursing Notes and Old Medical Records (Time of Review: 12:27 AM)    ED Course: Progress Notes, Reevaluation, and Consults:  4:13 AM Reviewed results with patient. No emesis while in the ED, no distress. Discussed need for close outpatient follow-up. Discussed strict return precautions, including tremors, vomiting, or any other medical concerns. Provider Notes (Medical Decision Making): 64-year-old female with history of alcohol abuse who presents due to nausea, vomiting, and generalized pain. Afebrile, nontoxic-appearing, looks well. No tremors, no AMS, no visual/auditory hallucinations. Labs essentially unremarkable. Ethyl alcohol negative. No abdominal tenderness to palpation. Patient without emesis throughout ED stay. Tolerating p.o. Will discharge with short course of Librium, Zofran, and instructions for close outpatient follow-up      Diagnosis     Clinical Impression:   1. Non-intractable vomiting with nausea, unspecified vomiting type    2.  Alcohol withdrawal syndrome without complication ([facility] [address]        Disposition: home     Follow-up Information     Follow up With Specialties Details Why Contact Info    [facility] EMERGENCY DEPT Emergency Medicine  If symptoms worsen [address] [place] [address] [address] [facility]    Bolivar Arreola PA-C Physician Assistant In 2 days  [place] [id number]             Patient's Medications Start Taking    CHLORDIAZEPOXIDE (LIBRIUM) 5 MG CAPSULE    Take 1 Cap by mouth three (3) times daily as needed for Anxiety. Max Daily Amount: 15 mg.    ONDANSETRON (ZOFRAN ODT) 4 MG DISINTEGRATING TABLET    Take 1-2 tablets every 6-8 hours as needed for nausea and vomiting. Continue Taking    CARBAMAZEPINE (TEGRETOL) 200 MG TABLET    Take 200 mg by mouth. ESOMEPRAZOLE (NEXIUM) 40 MG CAPSULE    TAKE 1 CAPSULE BY MOUTH ONCE DAILY IN THE MORNING 30 MINUTES BEFORE BREAKFAST    FOLIC ACID (FOLVITE) 1 MG TABLET    Take 1 mg by mouth. GABAPENTIN (NEURONTIN) 600 MG TABLET    Take  by mouth three (3) times daily. HYDROXYZINE HCL (ATARAX) 50 MG TABLET    Take 50 mg by mouth four (4) times daily. LAMOTRIGINE (LAMICTAL) 25 MG TABLET    TAKE 1 TABLET BY MOUTH EVERY DAY AT BEDTIME FOR 2 WEEKS    MIRTAZAPINE (REMERON) 15 MG TABLET    TAKE 1 TABLET BY MOUTH EVERY DAY AT BEDTIME    NALTREXONE (DEPADE) 50 MG TABLET    TAKE 1 TABLET BY MOUTH ONCE DAILY    OMEPRAZOLE DELAYED RELEASE (PRILOSEC D/R) 20 MG TABLET    Take 1 Tab by mouth daily. PROPRANOLOL (INDERAL) 20 MG TABLET    Take  by mouth three (3) times daily. QUETIAPINE (SEROQUEL) 25 MG TABLET    Take 100 mg by mouth nightly as needed. SUMATRIPTAN (IMITREX) 100 MG TABLET    Take 100 mg by mouth once as needed for Migraine. TOPIRAMATE (TOPAMAX) 25 MG TABLET    Take 1 Tab by mouth two (2) times a day. TRAZODONE (DESYREL) 100 MG TABLET    Take 100 mg by mouth nightly. These Medications have changed    No medications on file   Stop Taking    PROMETHAZINE (PHENERGAN) 25 MG TABLET    Take 1 Tab by mouth every six (6) hours as needed for Nausea. Caution: This medication may make you drowsy. Avoid driving while under the influence of this medication. Dictation disclaimer:  Please note that this dictation was completed with [employer], the [employer] voice recognition software.   Quite often unanticipated grammatical, syntax, homophones, and other interpretive errors are inadvertently transcribed by the computer software. Please disregard these errors. Please excuse any errors that have escaped final proofreading.

## 2019-12-02 LAB
BACTERIA SPEC CULT: ABNORMAL
SERVICE CMNT-IMP: ABNORMAL

## 2019-12-03 PROBLEM — G40.909 SEIZURE DISORDER (HCC): Status: ACTIVE | Noted: 2019-12-03

## 2019-12-03 PROBLEM — F10.10 ALCOHOL ABUSE: Status: ACTIVE | Noted: 2019-12-03

## 2019-12-03 PROBLEM — F41.9 ANXIETY AND DEPRESSION: Status: ACTIVE | Noted: 2019-12-03

## 2019-12-03 PROBLEM — Z76.5 MALINGERING: Status: ACTIVE | Noted: 2019-09-01

## 2019-12-03 PROBLEM — G62.9 NEUROPATHY: Status: ACTIVE | Noted: 2019-08-27

## 2019-12-03 PROBLEM — F32.A ANXIETY AND DEPRESSION: Status: ACTIVE | Noted: 2019-12-03

## 2019-12-03 PROBLEM — K86.0 ALCOHOL-INDUCED CHRONIC PANCREATITIS (HCC): Status: ACTIVE | Noted: 2019-12-03

## 2019-12-03 PROBLEM — Z86.59 HX OF BIPOLAR DISORDER: Status: ACTIVE | Noted: 2019-12-03

## 2019-12-03 PROBLEM — K85.90 PANCREATITIS: Status: ACTIVE | Noted: 2019-12-03

## 2019-12-03 NOTE — PATIENT INSTRUCTIONS
Learning About Alcohol Use Disorder  What is alcohol use disorder? Alcohol use disorder means that a person drinks alcohol even though it causes harm to themselves or others. It can range from mild to severe. The more signs of this disorder you have, the more severe it may be. Moderate to severe alcohol use disorder is sometimes called addiction. People who have it may find it hard to control their use of alcohol. People who have this disorder may argue with others about how much they're drinking. Their job may be affected because of drinking. They may drink when it's dangerous or illegal, such as when they drive. They also may have a strong need, or craving, to drink. They may feel like they must drink just to get by. Their drinking may increase their risk of getting hurt or being in a car crash. Over time, drinking too much alcohol may cause health problems. These may include high blood pressure, liver problems, or problems with digestion. What are the signs? Maybe you've wondered about your alcohol habits, or how to tell if your drinking is becoming a problem. Here are some of the signs of alcohol use disorder. You may have it if you have two or more of the following signs:  · You drink larger amounts of alcohol than you ever meant to. Or you've been drinking for a longer time than you ever meant to. · You can't cut down or control your use. Or you constantly wish you could cut down. · You spend a lot of time getting or drinking alcohol or recovering from its effects. · You have strong cravings for alcohol. · You can no longer do your main jobs at work, at school, or at home. · You keep drinking alcohol, even though your use hurts your relationships. · You have stopped doing important activities because of your alcohol use. · You drink alcohol in situations where doing so is dangerous. · You keep drinking alcohol even though you know it's causing health problems.   · You need more and more alcohol to get the same effect, or you get less effect from the same amount over time. This is called tolerance. · You have uncomfortable symptoms when you stop drinking alcohol or use less. This is called withdrawal.  Alcohol use disorder can range from mild to severe. The more signs you have, the more severe the disorder may be. Moderate to severe alcohol use disorder is sometimes called addiction. You might not realize that your drinking is a problem. You might not drink large amounts when you drink. Or you might go for days or weeks between drinking episodes. But even if you don't drink very often, your drinking could still be harmful and put you at risk. How is alcohol use disorder treated? Getting help is up to you. But you don't have to do it alone. There are many people and kinds of treatments that can help. Treatment for alcohol use disorder can include:  · Group therapy, one or more types of counseling, and alcohol education. · Medicines that help to:  ? Reduce withdrawal symptoms and help you safely stop drinking. ? Reduce cravings for alcohol. · Support groups. These groups include Alcoholics Anonymous and [employer] (Self-Management and Recovery Training). Some people are able to stop or cut back on drinking with help from a counselor. People who have moderate to severe alcohol use disorder may need medical treatment. They may need to stay in a hospital or treatment center. You may have a treatment team to help you. This team may include a psychologist or psychiatrist, counselors, doctors, social workers, nurses, and a . A  helps plan and manage your treatment. Follow-up care is a key part of your treatment and safety. Be sure to make and go to all appointments, and call your doctor if you are having problems. It's also a good idea to know your test results and keep a list of the medicines you take. Where can you learn more?   Go to http://jeronimo.info/. Enter [id number] in the search box to learn more about \"Learning About Alcohol Use Disorder. \"  Current as of: February 5, 2019  Content Version: 12.2  © 5489-3274 [employer]. Care instructions adapted under license by [employer] (which disclaims liability or warranty for this information). If you have questions about a medical condition or this instruction, always ask your healthcare professional. [place] [address] any warranty or liability for your use of this information.

## 2019-12-06 ENCOUNTER — OFFICE VISIT (OUTPATIENT)
Dept: FAMILY MEDICINE CLINIC | Age: 39
End: 2019-12-06

## 2019-12-06 VITALS
WEIGHT: 180 LBS | RESPIRATION RATE: 18 BRPM | SYSTOLIC BLOOD PRESSURE: 111 MMHG | TEMPERATURE: 97.4 F | OXYGEN SATURATION: 98 % | HEART RATE: 75 BPM | DIASTOLIC BLOOD PRESSURE: 71 MMHG | BODY MASS INDEX: 29.99 KG/M2 | HEIGHT: 65 IN

## 2019-12-06 DIAGNOSIS — R30.9 PAINFUL URINATION: Primary | ICD-10-CM

## 2019-12-06 DIAGNOSIS — R30.9 PAINFUL URINATION: ICD-10-CM

## 2019-12-06 DIAGNOSIS — N30.01 ACUTE CYSTITIS WITH HEMATURIA: ICD-10-CM

## 2019-12-06 LAB
BILIRUB UR QL STRIP: NORMAL
GLUCOSE UR-MCNC: NEGATIVE MG/DL
KETONES P FAST UR STRIP-MCNC: NEGATIVE MG/DL
PH UR STRIP: 6 [PH] (ref 4.6–8)
PROT UR QL STRIP: NORMAL
SP GR UR STRIP: 1.03 (ref 1–1.03)
UA UROBILINOGEN AMB POC: NORMAL (ref 0.2–1)
URINALYSIS CLARITY POC: CLEAR
URINALYSIS COLOR POC: YELLOW
URINE BLOOD POC: NORMAL
URINE LEUKOCYTES POC: NORMAL
URINE NITRITES POC: NEGATIVE

## 2019-12-06 RX ORDER — FUROSEMIDE 20 MG/1
TABLET ORAL
Refills: 1 | COMMUNITY
Start: 2019-10-01 | End: 2020-02-11

## 2019-12-06 RX ORDER — PANTOPRAZOLE SODIUM 40 MG/1
TABLET, DELAYED RELEASE ORAL
Refills: 1 | COMMUNITY
Start: 2019-11-01

## 2019-12-06 RX ORDER — LORAZEPAM 1 MG/1
TABLET ORAL
Refills: 0 | COMMUNITY
Start: 2019-11-03 | End: 2019-12-18

## 2019-12-06 RX ORDER — LACTULOSE 10 G/15ML
SOLUTION ORAL
Refills: 10 | COMMUNITY
Start: 2019-10-02 | End: 2020-02-11

## 2019-12-06 RX ORDER — ONDANSETRON 4 MG/1
TABLET, FILM COATED ORAL
Refills: 0 | COMMUNITY
Start: 2019-10-10 | End: 2019-12-18

## 2019-12-06 RX ORDER — SPIRONOLACTONE 50 MG/1
TABLET, FILM COATED ORAL
Refills: 1 | COMMUNITY
Start: 2019-10-01 | End: 2020-02-11

## 2019-12-06 RX ORDER — ESCITALOPRAM OXALATE 10 MG/1
TABLET ORAL
Refills: 0 | COMMUNITY
Start: 2019-09-30 | End: 2020-02-11

## 2019-12-06 RX ORDER — PROMETHAZINE HYDROCHLORIDE 12.5 MG/1
TABLET ORAL
Refills: 0 | COMMUNITY
Start: 2019-10-02

## 2019-12-06 NOTE — PROGRESS NOTES
Sumeet Osman is a 37 y.o. female here today with c/o painful urination x 3-4 days. She has had blood in her urine and ran a fever yesterday. Patient c/o lower abdominal and back pain.

## 2019-12-06 NOTE — TELEPHONE ENCOUNTER
Patient states she received it from [address] [place] [address] for nerve pain. [facility] in [facility].

## 2019-12-06 NOTE — PROGRESS NOTES
Greg Hanna is a 37 y.o. female who presents to the office today for urinary pain. She comes in with concerns of ongoing UTI. She states last week she was treated with a UTI with macrobid, and then 2 days ago in her detox program her abx was changed to bactrim. She says her symptoms are not improving. She is still having urinary burning and frequency, feelings of incomplete bladder emptying, and low back pain. Denies fever but has chills. She is Currently at an inpatient detox program for alcohol withdrawal as requested by Ortho. Chief Complaint   Patient presents with    Bladder Infection   . Current Outpatient Medications on File Prior to Visit   Medication Sig Dispense Refill    SUMAtriptan (IMITREX) 100 mg tablet Take 100 mg by mouth once as needed for Migraine.  topiramate (TOPAMAX) 25 mg tablet Take 1 Tab by mouth two (2) times a day. 60 Tab 2    mirtazapine (REMERON) 15 mg tablet TAKE 1 TABLET BY MOUTH EVERY DAY AT BEDTIME  2    propranolol (INDERAL) 20 mg tablet Take  by mouth two (2) times a day.  QUEtiapine (SEROQUEL) 25 mg tablet Take 100 mg by mouth nightly as needed.  traZODone (DESYREL) 100 mg tablet Take 100 mg by mouth nightly.  Omeprazole delayed release (PRILOSEC D/R) 20 mg tablet Take 1 Tab by mouth daily. 30 Tab 0    esomeprazole (NEXIUM) 40 mg capsule TAKE 1 CAPSULE BY MOUTH ONCE DAILY IN THE MORNING 30 MINUTES BEFORE BREAKFAST  1    lamoTRIgine (LAMICTAL) 25 mg tablet TAKE 1 TABLET BY MOUTH EVERY DAY AT BEDTIME FOR 2 WEEKS  0    naltrexone (DEPADE) 50 mg tablet TAKE 1 TABLET BY MOUTH ONCE DAILY  1    gabapentin (NEURONTIN) 600 mg tablet Take  by mouth three (3) times daily.  carBAMazepine (TEGRETOL) 200 mg tablet Take 200 mg by mouth.  folic acid (FOLVITE) 1 mg tablet Take 1 mg by mouth. No current facility-administered medications on file prior to visit.       Allergies   Allergen Reactions    Seafood Anaphylaxis     Pt is ok with iodine contrast. Patient is allergic to shellfish.  Shellfish Derived Anaphylaxis    Codeine Hives    Penicillins Hives     Past Medical History:   Diagnosis Date    Adverse effect of anesthesia     Anxiety     Endometriosis     EtOH dependence ([address]     Patient reports daily Vodka consumption for years with withdrawal symptoms in the past    GERD (gastroesophageal reflux disease)     Ill-defined condition     meningitis    Liver mass 2016    small liver mass found per pt during last hospital stay    Orthodontics     Pancreatitis     Peptic ulcer     Renal cyst     Routine lab draw     Patient reports that vein finder may be required    Seizures ([address]     due to alcohol withdrawal    Severe dysplasia of cervix     Urine frequency     Withdrawal symptoms, alcohol (HCC)      Social History     Tobacco Use   Smoking Status Current Every Day Smoker    Years: 22.00   Smokeless Tobacco Never Used   Tobacco Comment    3 or 4 cigarettes per day. Social History     Substance and Sexual Activity   Alcohol Use Not Currently     Family History   Problem Relation Age of Onset    Cancer Mother         Cervical and uterine cancer at age 39    Hypertension Mother    [facility] COPD Mother     Hypertension Father     Kidney Disease Maternal Aunt     Kidney Disease Maternal Uncle     Hypertension Maternal Grandmother     Other Other         Gallbladder problems and kidnsy stones problems on mom side       Review of Systems   Constitutional: Positive for chills and malaise/fatigue. Negative for fever. Gastrointestinal: Positive for abdominal pain. Negative for nausea and vomiting. Genitourinary: Positive for dysuria, flank pain, frequency and urgency. Negative for hematuria. Musculoskeletal: Negative for myalgias. Neurological: Negative for dizziness, loss of consciousness and headaches.      Visit Vitals  /71 (BP 1 Location: Left arm, BP Patient Position: Sitting)   Pulse 75   Temp 97.4 °F (36.3 °C) (Oral)   Resp 18   Ht 5' 5\" (1.651 m)   Wt 180 lb (81.6 kg)   LMP 11/25/2019 (Approximate)   SpO2 98%   BMI 29.95 kg/m²     Physical Exam  Vitals signs and nursing note reviewed. Constitutional:       Appearance: Normal appearance. She is not ill-appearing or toxic-appearing. Cardiovascular:      Rate and Rhythm: Normal rate and regular rhythm. Pulses: Normal pulses. Heart sounds: No murmur. Pulmonary:      Effort: Pulmonary effort is normal.      Breath sounds: Normal breath sounds. Abdominal:      General: There is no distension. Palpations: Abdomen is soft. Tenderness: There is tenderness. There is no right CVA tenderness, left CVA tenderness or guarding. Skin:     General: Skin is warm and dry. Neurological:      General: No focal deficit present. Mental Status: She is alert and oriented to person, place, and time. Psychiatric:         Mood and Affect: Mood normal.         Behavior: Behavior normal.         Thought Content: Thought content normal.       Assessment and Plan  1. Painful urination  - AMB POC URINALYSIS DIP STICK AUTO W/O MICRO  - CULTURE, URINE; Future    2. Acute cystitis with hematuria  - CULTURE, URINE; Future    Suggested she go to ED for eval of possible pylelonephritis but she declines. She will continue bactrim as she is only on day #2. I will send her urine out for culture. She also sees a urologist and I encouraged follow up with them soon. Reviewed medication and side effects. Patient agrees with the plan and verbalizes understanding. Sameera Gee PA-C  12/26/2019    PLEASE NOTE:  This document has been produced using voice recognition software. Unrecognized errors in transcription may be present.

## 2019-12-12 LAB — BACTERIA UR CULT: ABNORMAL

## 2019-12-13 ENCOUNTER — HOSPITAL ENCOUNTER (EMERGENCY)
Age: 39
Discharge: HOME | End: 2019-12-13
Attending: EMERGENCY MEDICINE
Payer: MEDICAID

## 2019-12-13 VITALS
DIASTOLIC BLOOD PRESSURE: 88 MMHG | SYSTOLIC BLOOD PRESSURE: 131 MMHG | HEART RATE: 60 BPM | OXYGEN SATURATION: 99 % | TEMPERATURE: 97.9 F | RESPIRATION RATE: 16 BRPM

## 2019-12-13 DIAGNOSIS — N39.0 URINARY TRACT INFECTION WITHOUT HEMATURIA, SITE UNSPECIFIED: Primary | ICD-10-CM

## 2019-12-13 LAB
APPEARANCE UR: CLEAR
BACTERIA URNS QL MICRO: ABNORMAL /HPF
BILIRUB UR QL: NEGATIVE
COLOR UR: YELLOW
EPITH CASTS URNS QL MICRO: ABNORMAL /LPF (ref 0–5)
GLUCOSE UR STRIP.AUTO-MCNC: NEGATIVE MG/DL
HCG UR QL: NEGATIVE
HGB UR QL STRIP: NEGATIVE
KETONES UR QL STRIP.AUTO: NEGATIVE MG/DL
LEUKOCYTE ESTERASE UR QL STRIP.AUTO: ABNORMAL
NITRITE UR QL STRIP.AUTO: NEGATIVE
PH UR STRIP: 5.5 [PH] (ref 5–8)
PROT UR STRIP-MCNC: NEGATIVE MG/DL
RBC #/AREA URNS HPF: NEGATIVE /HPF (ref 0–5)
SP GR UR REFRACTOMETRY: 1.02 (ref 1–1.03)
UROBILINOGEN UR QL STRIP.AUTO: 1 EU/DL (ref 0.2–1)
WBC URNS QL MICRO: ABNORMAL /HPF (ref 0–4)

## 2019-12-13 PROCEDURE — 81025 URINE PREGNANCY TEST: CPT

## 2019-12-13 PROCEDURE — 99282 EMERGENCY DEPT VISIT SF MDM: CPT

## 2019-12-13 PROCEDURE — 87086 URINE CULTURE/COLONY COUNT: CPT

## 2019-12-13 PROCEDURE — 81001 URINALYSIS AUTO W/SCOPE: CPT

## 2019-12-13 PROCEDURE — 87491 CHLMYD TRACH DNA AMP PROBE: CPT

## 2019-12-13 RX ORDER — PHENAZOPYRIDINE HYDROCHLORIDE 200 MG/1
200 TABLET, FILM COATED ORAL 3 TIMES DAILY
Qty: 6 TAB | Refills: 0 | Status: SHIPPED | OUTPATIENT
Start: 2019-12-13 | End: 2019-12-15

## 2019-12-13 NOTE — DISCHARGE INSTRUCTIONS

## 2019-12-13 NOTE — ED PROVIDER NOTES
EMERGENCY DEPARTMENT HISTORY AND PHYSICAL EXAM    5:13 PM      Date: 12/13/2019  Patient Name: Filippo Ordonez    History of Presenting Illness     Chief Complaint   Patient presents with    UTI    Back Pain         History Provided By: Patient    Additional History (Context): Filippo Ordonez is a 37 y.o. female with UTI, anxiety, GERD who presents with dysuria and lower back pain for 4 weeks. Pt reports she went to her doctor last week and was started on cipro after she finished taking bactrim. Pt reports she still has urinary discomfort and is taking her antibiotics. Pt denies vaginal discharge or concern for STD, pt stated she was tested for STD recently as well and it was negative. Pt denies fevers, nausea, vomiting, abdominal pain, or diarrhea. PCP: Stanley Cabrera PA-C    Current Outpatient Medications   Medication Sig Dispense Refill    phenazopyridine (PYRIDIUM) 200 mg tablet Take 1 Tab by mouth three (3) times daily for 2 days. 6 Tab 0    spironolactone (ALDACTONE) 50 mg tablet TAKE 1 TABLET BY MOUTH IN THE MORNING FOR ASCITES EDEMA  1    promethazine (PHENERGAN) 12.5 mg tablet TAKE 1 TABLET BY MOUTH 4 TIMES DAILY  0    pantoprazole (PROTONIX) 40 mg tablet TAKE 1 TABLET BY MOUTH ONCE DAILY  1    ondansetron hcl (ZOFRAN) 4 mg tablet TAKE 1 TABLET BY MOUTH EVERY 8 HOURS AS NEEDED  0    LORazepam (ATIVAN) 1 mg tablet take 1 tablet by mouth PRIOR TO MRI  0    CONSTULOSE 10 gram/15 mL solution TAKE 30 ML BY MOUTH THREE TIMES A DAY MONITOR FOR DIARRHEA  10    furosemide (LASIX) 20 mg tablet TAKE 1 TABLET BY MOUTH ONCE DAILY  1    escitalopram oxalate (LEXAPRO) 10 mg tablet TAKE 1 TABLET BY MOUTH EVERY DAY AT BEDTIME  0    ondansetron (ZOFRAN ODT) 4 mg disintegrating tablet Take 1-2 tablets every 6-8 hours as needed for nausea and vomiting. 10 Tab 0    chlordiazePOXIDE (LIBRIUM) 5 mg capsule Take 1 Cap by mouth three (3) times daily as needed for Anxiety.  Max Daily Amount: 15 mg. 12 Cap 0  Omeprazole delayed release (PRILOSEC D/R) 20 mg tablet Take 1 Tab by mouth daily. 30 Tab 0    SUMAtriptan (IMITREX) 100 mg tablet Take 100 mg by mouth once as needed for Migraine.  topiramate (TOPAMAX) 25 mg tablet Take 1 Tab by mouth two (2) times a day. 60 Tab 2    esomeprazole (NEXIUM) 40 mg capsule TAKE 1 CAPSULE BY MOUTH ONCE DAILY IN THE MORNING 30 MINUTES BEFORE BREAKFAST  1    lamoTRIgine (LAMICTAL) 25 mg tablet TAKE 1 TABLET BY MOUTH EVERY DAY AT BEDTIME FOR 2 WEEKS  0    mirtazapine (REMERON) 15 mg tablet TAKE 1 TABLET BY MOUTH EVERY DAY AT BEDTIME  2    naltrexone (DEPADE) 50 mg tablet TAKE 1 TABLET BY MOUTH ONCE DAILY  1    propranolol (INDERAL) 20 mg tablet Take  by mouth two (2) times a day.  gabapentin (NEURONTIN) 600 mg tablet Take  by mouth three (3) times daily.  carBAMazepine (TEGRETOL) 200 mg tablet Take 200 mg by mouth.  folic acid (FOLVITE) 1 mg tablet Take 1 mg by mouth.  QUEtiapine (SEROQUEL) 25 mg tablet Take 100 mg by mouth nightly as needed.  traZODone (DESYREL) 100 mg tablet Take 50 mg by mouth nightly.  hydrOXYzine HCl (ATARAX) 50 mg tablet Take 50 mg by mouth four (4) times daily.          Past History     Past Medical History:  Past Medical History:   Diagnosis Date    Adverse effect of anesthesia     Anxiety     Endometriosis     EtOH dependence ([address]     Patient reports daily Vodka consumption for years with withdrawal symptoms in the past    GERD (gastroesophageal reflux disease)     Ill-defined condition     meningitis    Liver mass 2016    small liver mass found per pt during last hospital stay    Orthodontics     Pancreatitis     Peptic ulcer     Renal cyst     Routine lab draw     Patient reports that vein finder may be required    Seizures ([address]     due to alcohol withdrawal    Severe dysplasia of cervix     Urine frequency     Withdrawal symptoms, alcohol ([address]        Past Surgical History:  Past Surgical History: Procedure Laterality Date    COLPOSCOPY      x 2    HX CHOLECYSTECTOMY      HX GYN      colposcopy d and c    HX LEEP PROCEDURE      HX OTHER SURGICAL      LIVER SURGERY PROCEDURE UNLISTED  2004    \"Half of liver removed\"        Family History:  Family History   Problem Relation Age of Onset    Cancer Mother         Cervical and uterine cancer at age 39    Hypertension Mother    [facility] COPD Mother     Hypertension Father     Kidney Disease Maternal Aunt     Kidney Disease Maternal Uncle     Hypertension Maternal Grandmother     Other Other         Gallbladder problems and kidnsy stones problems on mom side       Social History:  Social History     Tobacco Use    Smoking status: Current Every Day Smoker     Packs/day: 1.00     Years: 22.00     Pack years: 22.00    Smokeless tobacco: Never Used   Substance Use Topics    Alcohol use: Yes     Comment: daily liquor moderate amount    Drug use: Yes     Types: Marijuana     Comment: Last used on 10/12/19       Allergies: Allergies   Allergen Reactions    Seafood Anaphylaxis     Pt is ok with iodine contrast. Patient is allergic to shellfish.  Shellfish Derived Anaphylaxis    Codeine Hives    Penicillins Hives         Review of Systems       Review of Systems   Constitutional: Negative for chills and fever. Respiratory: Negative for shortness of breath. Cardiovascular: Negative for chest pain. Gastrointestinal: Negative for abdominal pain, nausea and vomiting. Genitourinary: Positive for dysuria. Musculoskeletal: Positive for back pain. Skin: Negative for rash. Neurological: Negative for weakness. All other systems reviewed and are negative. Physical Exam     Visit Vitals  /88 (BP 1 Location: Left arm, BP Patient Position: At rest)   Pulse 60   Temp 97.9 °F (36.6 °C)   Resp 16   LMP 11/25/2019 (Approximate)   SpO2 99%         Physical Exam  Vitals signs reviewed.    Constitutional:       General: She is not in acute distress. Appearance: Normal appearance. She is well-developed. She is not ill-appearing or toxic-appearing. HENT:      Head: Normocephalic and atraumatic. Eyes:      Conjunctiva/sclera: Conjunctivae normal.      Pupils: Pupils are equal, round, and reactive to light. Neck:      Musculoskeletal: Normal range of motion. Trachea: Trachea normal.   Cardiovascular:      Rate and Rhythm: Normal rate and regular rhythm. Pulmonary:      Effort: Pulmonary effort is normal.      Breath sounds: Normal breath sounds. Abdominal:      General: Bowel sounds are normal. There is no abdominal bruit. Palpations: Abdomen is soft. Abdomen is not rigid. There is no shifting dullness, fluid wave or pulsatile mass. Tenderness: Negative signs include Weathers's sign and McBurney's sign. Neurological:      Mental Status: She is alert and oriented to person, place, and time. Diagnostic Study Results     Labs -  Recent Results (from the past 12 hour(s))   URINALYSIS W/ RFLX MICROSCOPIC    Collection Time: 12/13/19  2:35 PM   Result Value Ref Range    Color YELLOW      Appearance CLEAR      Specific gravity 1.021 1.005 - 1.030      pH (UA) 5.5 5.0 - 8.0      Protein NEGATIVE  NEG mg/dL    Glucose NEGATIVE  NEG mg/dL    Ketone NEGATIVE  NEG mg/dL    Bilirubin NEGATIVE  NEG      Blood NEGATIVE  NEG      Urobilinogen 1.0 0.2 - 1.0 EU/dL    Nitrites NEGATIVE  NEG      Leukocyte Esterase TRACE (A) NEG     HCG URINE, QL    Collection Time: 12/13/19  2:35 PM   Result Value Ref Range    HCG urine, QL NEGATIVE  NEG     URINE MICROSCOPIC ONLY    Collection Time: 12/13/19  2:35 PM   Result Value Ref Range    WBC 1 to 2 0 - 4 /hpf    RBC NEGATIVE  0 - 5 /hpf    Epithelial cells FEW 0 - 5 /lpf    Bacteria FEW (A) NEG /hpf       Radiologic Studies -   No orders to display         Medical Decision Making   I am the first provider for this patient.     I reviewed the vital signs, available nursing notes, past medical history, past surgical history, family history and social history. Vital Signs-Reviewed the patient's vital signs. Records Reviewed: Nursing Notes and Old Medical Records (Time of Review: 5:13 PM)    ED Course: Progress Notes, Reevaluation, and Consults:      Provider Notes (Medical Decision Making):  Urine reviewed has some wbc's. Prior urine culture from 12/09/2019 reviewed and it was positive for Enterococcus faecalis Enterococcus faecalis, which is susceptible to current antibiotic regime. Pt had no CVA tenderness, denied discharge, pt afebrile during visit. Pt to follow up with her PCP. Pyrydium given for discomfort. Diagnosis     Clinical Impression:   1. Urinary tract infection without hematuria, site unspecified        Disposition: home     Follow-up Information     Follow up With Specialties Details Why Contact Info    Scott Wolf PA-C Physician Assistant Schedule an appointment as soon as possible for a visit in 2 days  [address] [facility] EMERGENCY DEPT Emergency Medicine  If symptoms worsen [address] [place] [id number]  [phone]           Discharge Medication List as of 12/13/2019  4:13 PM      START taking these medications    Details   phenazopyridine (PYRIDIUM) 200 mg tablet Take 1 Tab by mouth three (3) times daily for 2 days. , Normal, Disp-6 Tab, R-0         CONTINUE these medications which have NOT CHANGED    Details   spironolactone (ALDACTONE) 50 mg tablet TAKE 1 TABLET BY MOUTH IN THE MORNING FOR ASCITES EDEMA, Historical Med, R-1      promethazine (PHENERGAN) 12.5 mg tablet TAKE 1 TABLET BY MOUTH 4 TIMES DAILY, Historical Med, R-0      pantoprazole (PROTONIX) 40 mg tablet TAKE 1 TABLET BY MOUTH ONCE DAILY, Historical Med, R-1      ondansetron hcl (ZOFRAN) 4 mg tablet TAKE 1 TABLET BY MOUTH EVERY 8 HOURS AS NEEDED, Historical Med, R-0      LORazepam (ATIVAN) 1 mg tablet take 1 tablet by mouth PRIOR TO MRI, Historical Med, R-0      CONSTULOSE 10 gram/15 mL solution TAKE 30 ML BY MOUTH THREE TIMES A DAY MONITOR FOR DIARRHEA, Historical Med, R-10, SARAH      furosemide (LASIX) 20 mg tablet TAKE 1 TABLET BY MOUTH ONCE DAILY, Historical Med, R-1      escitalopram oxalate (LEXAPRO) 10 mg tablet TAKE 1 TABLET BY MOUTH EVERY DAY AT BEDTIME, Historical Med, R-0      ondansetron (ZOFRAN ODT) 4 mg disintegrating tablet Take 1-2 tablets every 6-8 hours as needed for nausea and vomiting., Print, Disp-10 Tab, R-0      chlordiazePOXIDE (LIBRIUM) 5 mg capsule Take 1 Cap by mouth three (3) times daily as needed for Anxiety. Max Daily Amount: 15 mg., Print, Disp-12 Cap, R-0      Omeprazole delayed release (PRILOSEC D/R) 20 mg tablet Take 1 Tab by mouth daily. , Print, Disp-30 Tab, R-0      SUMAtriptan (IMITREX) 100 mg tablet Take 100 mg by mouth once as needed for Migraine., Historical Med      topiramate (TOPAMAX) 25 mg tablet Take 1 Tab by mouth two (2) times a day., Normal, Disp-60 Tab, R-2      esomeprazole (NEXIUM) 40 mg capsule TAKE 1 CAPSULE BY MOUTH ONCE DAILY IN THE MORNING 30 MINUTES BEFORE BREAKFAST, Historical Med, R-1      lamoTRIgine (LAMICTAL) 25 mg tablet TAKE 1 TABLET BY MOUTH EVERY DAY AT BEDTIME FOR 2 WEEKS, Historical Med, R-0      mirtazapine (REMERON) 15 mg tablet TAKE 1 TABLET BY MOUTH EVERY DAY AT BEDTIME, Historical Med, R-2      naltrexone (DEPADE) 50 mg tablet TAKE 1 TABLET BY MOUTH ONCE DAILY, Historical Med, R-1      propranolol (INDERAL) 20 mg tablet Take  by mouth two (2) times a day., Historical Med      gabapentin (NEURONTIN) 600 mg tablet Take  by mouth three (3) times daily. , Historical Med      carBAMazepine (TEGRETOL) 200 mg tablet Take 200 mg by mouth., Historical Med      folic acid (FOLVITE) 1 mg tablet Take 1 mg by mouth., Historical Med      QUEtiapine (SEROQUEL) 25 mg tablet Take 100 mg by mouth nightly as needed., Historical Med      traZODone (DESYREL) 100 mg tablet Take 50 mg by mouth nightly., Historical Med      hydrOXYzine HCl (ATARAX) 50 mg tablet Take 50 mg by mouth four (4) times daily. , Historical Med             Dictation disclaimer:  Please note that this dictation was completed with [employer], the computer voice recognition software. Quite often unanticipated grammatical, syntax, homophones, and other interpretive errors are inadvertently transcribed by the computer software. Please disregard these errors. Please excuse any errors that have escaped final proofreading.

## 2019-12-13 NOTE — ED TRIAGE NOTES
Pt arrived through triage with c/o UTI and back pain x 3 weeks. Pt has been on multiple antibiotics without successful treatment.

## 2019-12-15 LAB
BACTERIA SPEC CULT: NORMAL
SERVICE CMNT-IMP: NORMAL

## 2019-12-20 LAB
C TRACH RRNA SPEC QL NAA+PROBE: NEGATIVE
N GONORRHOEA RRNA SPEC QL NAA+PROBE: NEGATIVE
SPECIMEN SOURCE: NORMAL
T VAGINALIS RRNA VAG QL NAA+PROBE: NEGATIVE

## 2019-12-27 NOTE — PATIENT INSTRUCTIONS
Urinary Tract Infection in Women: Care Instructions Your Care Instructions A urinary tract infection, or UTI, is a general term for an infection anywhere between the kidneys and the urethra (where urine comes out). Most UTIs are bladder infections. They often cause pain or burning when you urinate. UTIs are caused by bacteria and can be cured with antibiotics. Be sure to complete your treatment so that the infection goes away. Follow-up care is a key part of your treatment and safety. Be sure to make and go to all appointments, and call your doctor if you are having problems. It's also a good idea to know your test results and keep a list of the medicines you take. How can you care for yourself at home? · Take your antibiotics as directed. Do not stop taking them just because you feel better. You need to take the full course of antibiotics. · Drink extra water and other fluids for the next day or two. This may help wash out the bacteria that are causing the infection. (If you have kidney, heart, or liver disease and have to limit fluids, talk with your doctor before you increase your fluid intake.) · Avoid drinks that are carbonated or have caffeine. They can irritate the bladder. · Urinate often. Try to empty your bladder each time. · To relieve pain, take a hot bath or lay a heating pad set on low over your lower belly or genital area. Never go to sleep with a heating pad in place. To prevent UTIs · Drink plenty of water each day. This helps you urinate often, which clears bacteria from your system. (If you have kidney, heart, or liver disease and have to limit fluids, talk with your doctor before you increase your fluid intake.) · Urinate when you need to. · Urinate right after you have sex. · Change sanitary pads often. · Avoid douches, bubble baths, feminine hygiene sprays, and other feminine hygiene products that have deodorants. · After going to the bathroom, wipe from front to back. When should you call for help? Call your doctor now or seek immediate medical care if: 
  · Symptoms such as fever, chills, nausea, or vomiting get worse or appear for the first time.  
  · You have new pain in your back just below your rib cage. This is called flank pain.  
  · There is new blood or pus in your urine.  
  · You have any problems with your antibiotic medicine.  
 Watch closely for changes in your health, and be sure to contact your doctor if: 
  · You are not getting better after taking an antibiotic for 2 days.  
  · Your symptoms go away but then come back. Where can you learn more? Go to http://willy-yenny.info/. Enter A402 in the search box to learn more about \"Urinary Tract Infection in Women: Care Instructions. \" Current as of: December 19, 2018 Content Version: 12.2 © 3707-5647 [employer]. Care instructions adapted under license by [employer] (which disclaims liability or warranty for this information). If you have questions about a medical condition or this instruction, always ask your healthcare professional. [address] any warranty or liability for your use of this information.

## 2020-01-14 ENCOUNTER — OFFICE VISIT (OUTPATIENT)
Dept: ORTHOPEDIC SURGERY | Age: 40
End: 2020-01-14

## 2020-01-14 VITALS
WEIGHT: 191.4 LBS | HEART RATE: 70 BPM | HEIGHT: 65 IN | SYSTOLIC BLOOD PRESSURE: 136 MMHG | BODY MASS INDEX: 31.89 KG/M2 | RESPIRATION RATE: 16 BRPM | DIASTOLIC BLOOD PRESSURE: 92 MMHG | TEMPERATURE: 96.7 F

## 2020-01-14 DIAGNOSIS — M21.611 BILATERAL BUNIONS: Primary | ICD-10-CM

## 2020-01-14 DIAGNOSIS — M21.612 BILATERAL BUNIONS: Primary | ICD-10-CM

## 2020-01-14 DIAGNOSIS — M79.671 RIGHT FOOT PAIN: ICD-10-CM

## 2020-01-14 DIAGNOSIS — G57.91 NEURITIS OF FOOT, RIGHT: ICD-10-CM

## 2020-01-14 NOTE — PROGRESS NOTES
AMBULATORY PROGRESS NOTE      Patient: Tenisha Andre             MRN: [id number]     SSN: [id number] Body mass index is 31.85 kg/m². YOB: 1980     AGE: 37 y.o. SEX: female    PCP: Melinda Hatfield PA-C     IMPRESSION/DIAGNOSIS AND TREATMENT PLAN     DIAGNOSES  1. Bilateral bunions    2. Neuritis of foot, right    3. Right foot pain        Orders Placed This Encounter    AMB POC XRAY, FOOT; COMPLETE, 3+ VIEW    MRI FOOT RT WO CONT      Alo Verdin understands her diagnoses and the proposed plan. Certainly, prior to any type of surgery on her, we need to talk to her current providers and her psychologist/psychiatrist to make sure she is stable and has completed her alcohol rehabilitation program.  She is currently still in-patient but at a level 3.1 center, she tells me. Plan:    1) Right hindfoot MRI to assess tarsal tunnel. 2) If MRI is normal Anticipate RLE EMG to assess foot if condition does not improve. 3) Patient wished to continue her current treatment. RTO - after MRI     HPI AND EXAMINATION     Alo Oleary IS A 37 y.o. female who presents to my outpatient office for a follow up of Right Lapidus procedure, modified Bauer distal soft tissue procedure, possible Akin procedure, possible bone grafting Date of surgery: 11/1/19 . At the last visit, Christin Lobo, provided orders for: walker, knee scooter, post op shoe, and raised toilet seat. In this individual who has a new onset of weakness and inability to flex and extend her toes with power and an inability to separate her toes of this right forefoot for which these symptoms began about four weeks ago, recommendation is for an MRI of her right hindfoot to assess the tarsal tunnel. Should this be normal, then I would proceed with EMG nerve conduction studies. It is to be recalled the patient is a 49-year-old female who has a history of a bunion deformity in the right forefoot that is painful. I saw her about a year ago. I was getting ready to perform surgery on her, but she has a history of chronic alcoholism, and she was housed by us at [facility] from November 21, 2019, for about six days. Then, she was released to the [facility] Down 3.5 alcohol unit for 40 days and was just recently released on January 6, 2020. She is now at the [facility] Step Down 3.1 unit in [place] on [facility]. This is still an in-patient rehabilitation center, but it is a \"level 3.1 center. \"     Her PCP is Kishore Yadav P.A.-C. Her psychologist is Dr. Yuliet Rocha. Hector for which he has written prescriptions for her in the past.     In the past, she had taken Neurontin for \"nerve injury\" after a stomach procedure but never took Neurontin for any forefoot pain issues. Her chief complaints today are:     1. Pain in her bunion at the medial portion of the right great toe first MTP.  2. Inability to flex and extend her toes fully and inability to separate her toes. This began about one month ago. She has had no additional fevers, shakes, chills, and night sweats or any history of trauma. Her x-rays, today, three views, AP, lateral, and oblique of her right foot, show a moderate bunion deformity, prominent medial eminence, flattened first TMT joint, first TMT region. No acute fracture, subluxation, or dislocation. No abnormal ____________ or calcifications are seen medially. She has a small os vesalianum adjacent to the cuboid. The ankle, subtalar, and transverse tarsal joints are well maintained in the lateral radiographic weightbearing film, and she has no significant spurs at all to the inferior and/or superior surface of this right calcaneus.         Visit Vitals  BP (!) 136/92   Pulse 70   Temp 96.7 °F (35.9 °C) (Oral)   Resp 16   Ht 5' 5\" (1.651 m)   Wt 191 lb 6.4 oz (86.8 kg)   BMI 31.85 kg/m²       Appearance: Alert, well appearing and pleasant patient who is in no distress, oriented to person, place/time, and who follows commands. This patient is accompanied in the examination room by her self. Dementia: no dementia  Psychiatric: Affect and mood are appropriate. Patient arrives to office via: without assistive device:    HEENT: Head normocephalic & atraumatic. Both pupils are round, non icteric sclera   Eye: EOM are intact and sclera are clear, yellow hue    Neck: ROM WNL and JVD neck is not present     Hearings Intact, does not require hearing aid device  Respiratory: Breathing is unlabored without accessory chest muscle use  Cardiovascular/Peripheral Vascular: Normal Pulses to each foot    ANKLE/FOOT right    Gait: Normal  Tenderness: Mild tenderness to the plantar fascia  central heel  Cutaneous:   WNL. Joint Motion:  WNL to ankle. Joint / Tendon Stability: No Ankle or Subtalar instability or joint laxity. No peroneal sublux ability or dislocation  Alignment: Forefoot, Midfoot, Hindfoot WNL. Neuro Motor/Sensory: NL/NL. Vascular: NL foot/ankle pulses. Lymphatics: No extremity lymphedema, No calf swelling, no tenderness to calf muscles. CHART REVIEW     Past Medical History:   Diagnosis Date    Adverse effect of anesthesia     Anxiety     Endometriosis     EtOH dependence ([facility] [address]     Patient reports daily Vodka consumption for years with withdrawal symptoms in the past    GERD (gastroesophageal reflux disease)     Ill-defined condition     meningitis    Liver mass 2016    small liver mass found per pt during last hospital stay   [place] Pancreatitis     Peptic ulcer     Renal cyst     Routine lab draw     Patient reports that vein finder may be required    Seizures ([facility] [address]     due to alcohol withdrawal    Severe dysplasia of cervix     Urine frequency     Withdrawal symptoms, alcohol (HCC)      Current Outpatient Medications   Medication Sig    oxybutynin chloride XL (DITROPAN XL) 10 mg CR tablet Take 1 Tab by mouth daily.  hydrOXYzine pamoate (VISTARIL) 100 mg capsule Take 100 mg by mouth three (3) times daily as needed for Itching. Atarax    OXcarbazepine (TRILEPTAL) 150 mg tablet Take  by mouth two (2) times a day.  spironolactone (ALDACTONE) 50 mg tablet TAKE 1 TABLET BY MOUTH IN THE MORNING FOR ASCITES EDEMA    promethazine (PHENERGAN) 12.5 mg tablet TAKE 1 TABLET BY MOUTH 4 TIMES DAILY    pantoprazole (PROTONIX) 40 mg tablet TAKE 1 TABLET BY MOUTH ONCE DAILY    CONSTULOSE 10 gram/15 mL solution TAKE 30 ML BY MOUTH THREE TIMES A DAY MONITOR FOR DIARRHEA    furosemide (LASIX) 20 mg tablet TAKE 1 TABLET BY MOUTH ONCE DAILY    escitalopram oxalate (LEXAPRO) 10 mg tablet TAKE 1 TABLET BY MOUTH EVERY DAY AT BEDTIME    Omeprazole delayed release (PRILOSEC D/R) 20 mg tablet Take 1 Tab by mouth daily.  SUMAtriptan (IMITREX) 100 mg tablet Take 100 mg by mouth once as needed for Migraine.  topiramate (TOPAMAX) 25 mg tablet Take 1 Tab by mouth two (2) times a day.  esomeprazole (NEXIUM) 40 mg capsule TAKE 1 CAPSULE BY MOUTH ONCE DAILY IN THE MORNING 30 MINUTES BEFORE BREAKFAST    lamoTRIgine (LAMICTAL) 25 mg tablet TAKE 1 TABLET BY MOUTH EVERY DAY AT BEDTIME FOR 2 WEEKS    mirtazapine (REMERON) 15 mg tablet TAKE 1 TABLET BY MOUTH EVERY DAY AT BEDTIME    naltrexone (DEPADE) 50 mg tablet TAKE 1 TABLET BY MOUTH ONCE DAILY    propranolol (INDERAL) 20 mg tablet Take  by mouth two (2) times a day.  carBAMazepine (TEGRETOL) 200 mg tablet Take 200 mg by mouth.  folic acid (FOLVITE) 1 mg tablet Take 1 mg by mouth.  QUEtiapine (SEROQUEL) 25 mg tablet Take 100 mg by mouth nightly as needed.  traZODone (DESYREL) 100 mg tablet Take 100 mg by mouth nightly.  gabapentin (NEURONTIN) 600 mg tablet Take  by mouth three (3) times daily. No current facility-administered medications for this visit.       Allergies   Allergen Reactions    Seafood Anaphylaxis     Pt is ok with iodine contrast. Patient is allergic to shellfish.  Shellfish Derived Anaphylaxis    Codeine Hives    Penicillins Hives     Past Surgical History:   Procedure Laterality Date    COLPOSCOPY      x 2    HX CHOLECYSTECTOMY      HX GYN      colposcopy d and c    HX LEEP PROCEDURE      HX OTHER SURGICAL      LIVER SURGERY PROCEDURE UNLISTED  2004    \"Half of liver removed\"      Social History     Occupational History    Not on file   Tobacco Use    Smoking status: Current Every Day Smoker     Years: 22.00    Smokeless tobacco: Never Used    Tobacco comment: 3 or 4 cigarettes per day. Substance and Sexual Activity    Alcohol use: Not Currently    Drug use: Not Currently     Types: Marijuana     Comment: Last used on 10/12/19.  Sexual activity: Yes     Partners: Male     Birth control/protection: None     Family History   Problem Relation Age of Onset    Cancer Mother         Cervical and uterine cancer at age 39    Hypertension Mother     COPD Mother     Hypertension Father     Kidney Disease Maternal Aunt     Kidney Disease Maternal Uncle     Hypertension Maternal Grandmother     Other Other         Gallbladder problems and kidnsy stones problems on mom side        REVIEW OF SYSTEMS : 1/14/2020  ALL BELOW ARE Negative except : SEE HPI         REVIEW OF SYSTEMS : Total of 12 systems reviewed as follows:            CONSTITUTIONAL: No weight loss. PSYCHOLOGICAL : No Feelings of anxiety, depression, agitation  EYES: No blurred vision and no eye discharge. NO eye pain, double vision  ENT: No nasal discharge. No ear pain. CARDIOVASCULAR: No chest pain and no diaphoresis. RESPIRATORY: No cough, no hemoptysis. GI: No vomiting, no diarrhea   : No urinary frequency and no dysuria. MUSCULOSKELETAL: see HPI  SKIN: No rashes. NEURO: Negative for : dizziness,weakness, headaches     Negative for : visual changes or confusion, or seizures,   ENDOCRINE: No polyphagia and no polydipsia. HEMATOLOGY: No bleeding tendencies. DIAGNOSTIC IMAGING     No notes on file    Please see above section of this report. I have personally reviewed the results of the above study. The interpretation of this study is my professional opinion. Written by Lynnette Marie, as dictated by Dr. Guru Driscoll. I, Dr. Guru Driscoll, confirm that all documentation is accurate.

## 2020-01-14 NOTE — PROGRESS NOTES
1. Have you been to the ER, urgent care clinic since your last visit? Hospitalized since your last visit? Yes When: December  Where: [facility] Reason for visit: UTI    2. Have you seen or consulted any other health care providers outside of the [address] [place] [address] since your last visit? Include any pap smears or colon screening.  No

## 2020-01-23 ENCOUNTER — TELEPHONE (OUTPATIENT)
Dept: ORTHOPEDIC SURGERY | Age: 40
End: 2020-01-23

## 2020-01-23 DIAGNOSIS — G57.51 TARSAL TUNNEL SYNDROME, RIGHT: Primary | ICD-10-CM

## 2020-01-23 NOTE — TELEPHONE ENCOUNTER
Dr. Bharti Mariee needs to assess the hind foot to evaluate for Tarsal Tunnel syndrome. As such, please order the MRI of the ankle.     Harshad Groves PA-C  1/23/2020  4:04 PM

## 2020-01-23 NOTE — TELEPHONE ENCOUNTER
I received an e-mail from [address] [place] [address] it states:    Please request an MRI Ankle order for [facility]; the patient needs an MRI Foot & Ankle per [facility].     0 [facility] MRI Diagnostic Imaging Order Request     Patient Name: Mike Howell MRN: [id number]

## 2020-01-24 RX ORDER — SUMATRIPTAN 100 MG/1
100 TABLET, FILM COATED ORAL
Qty: 9 TAB | Refills: 1 | Status: SHIPPED | OUTPATIENT
Start: 2020-01-24 | End: 2020-01-24

## 2020-01-24 NOTE — TELEPHONE ENCOUNTER
Requested Prescriptions     Pending Prescriptions Disp Refills    SUMAtriptan (IMITREX) 100 mg tablet       Sig: Take 1 Tab by mouth once as needed for Migraine for up to 1 dose. Patient is also wanting a refill of her Gabapentin.

## 2020-02-05 ENCOUNTER — TELEPHONE (OUTPATIENT)
Dept: ORTHOPEDIC SURGERY | Age: 40
End: 2020-02-05

## 2020-02-05 NOTE — TELEPHONE ENCOUNTER
[facility] calling to inform Dr Lemuel Zaidi that the patient's ins is requiring a Peer to Peer for the MRI of the ankle. Please call [facility] [phone] use patient ID for reference number. The case closes on 02/14/2020. Please call [facility] asa to get MRI Ankle auth.

## 2020-02-06 ENCOUNTER — HOSPITAL ENCOUNTER (OUTPATIENT)
Age: 40
Discharge: HOME | End: 2020-02-06
Attending: ORTHOPAEDIC SURGERY
Payer: MEDICAID

## 2020-02-06 ENCOUNTER — APPOINTMENT (OUTPATIENT)
Age: 40
End: 2020-02-06
Attending: ORTHOPAEDIC SURGERY
Payer: MEDICAID

## 2020-02-06 DIAGNOSIS — G57.91 NEURITIS OF FOOT, RIGHT: Primary | ICD-10-CM

## 2020-02-06 DIAGNOSIS — M79.671 RIGHT FOOT PAIN: ICD-10-CM

## 2020-02-06 PROCEDURE — 73718 MRI LOWER EXTREMITY W/O DYE: CPT

## 2020-02-06 NOTE — TELEPHONE ENCOUNTER
Peer to Peer completed. Request for the MRI of the right foot was approved. Approval number is [id number] good until 3/22/20. The MRI of the right ankle was denied. Since Dr. Ash Andrew wants to evaluate tarsal tunnel, the patient will need to have EMG NCS completed prior to ordering the MRI of the right ankle. When reorder of MRI of the right ankle at a later date, need to add the results of the x-rays, results of the EMG study and that we are evaluating tarsal tunnel.      Rodolfo Alvarado PA-C  2/6/2020  9:15 AM

## 2020-02-06 NOTE — TELEPHONE ENCOUNTER
Spoke with patient and informed her that we will order a EMG/NCS prior to proceeding with the MRI of the ankle. Patient advised to keep follow up appointment as scheduled. Order for EMG/NCS placed into chart.

## 2020-02-10 RX ORDER — SUMATRIPTAN 100 MG/1
TABLET, FILM COATED ORAL
Qty: 9 TAB | Refills: 0 | Status: SHIPPED | OUTPATIENT
Start: 2020-02-10 | End: 2020-02-19 | Stop reason: SDUPTHER

## 2020-02-12 ENCOUNTER — OFFICE VISIT (OUTPATIENT)
Dept: ORTHOPEDIC SURGERY | Age: 40
End: 2020-02-12

## 2020-02-12 VITALS
WEIGHT: 199 LBS | SYSTOLIC BLOOD PRESSURE: 87 MMHG | OXYGEN SATURATION: 100 % | HEART RATE: 76 BPM | HEIGHT: 65 IN | TEMPERATURE: 97.6 F | BODY MASS INDEX: 33.15 KG/M2 | DIASTOLIC BLOOD PRESSURE: 69 MMHG | RESPIRATION RATE: 16 BRPM

## 2020-02-12 DIAGNOSIS — R29.898 WEAKNESS OF FOOT, RIGHT: Primary | ICD-10-CM

## 2020-02-12 NOTE — PROGRESS NOTES
AMBULATORY PROGRESS NOTE      Patient: Diamond Cruz             MRN: [id number]     SSN: [id number] Body mass index is 33.12 kg/m². YOB: 1980     AGE: 37 y.o. SEX: female    PCP: Harry Benavides PA-C     IMPRESSION/DIAGNOSIS AND TREATMENT PLAN     DIAGNOSES    1. Weakness of foot, right        Orders Placed This Encounter    Generic Supply Order    REFERRAL TO NEUROLOGY      Sharnicqor Kitty Saint understands her diagnoses and the proposed plan. It is my professional opinion that Diamond Cruz has an Orthopaedic Diagnosis of     ICD-10-CM ICD-9-CM    1. Weakness of foot, right R29.898 734 REFERRAL TO NEUROLOGY      AMB SUPPLY ORDER      . Diamond Cruz has:     and Diamond Cruz is now in need of, [facility] NEUROLOGY CONSULT/EMG-NCV'S DUE TO HER WEKNES OF HER TOES AND RECENT H/O FREQUENT FALLS DUE TO RIGHT ANKLE WEAKNESS* in order to assess HER RIGHT FOOT NERVE FUNCTION     Plan:    1) Referral to Dr. Anai Patterson for EMG nerve study  2) DME order: right AS/AC ankle brace        HPI AND EXAMINATION     Sharnicqor Kitty Saint IS A 37 y.o. female who presents to my outpatient office for a follow up  REASSESSMENT of her right ankle weakness:       Correction: Diamond Cruz never had her right foot surgery ion the past due to her alcoholism    At the last visit, I provided an order for a right hindfoot MRI to assess tarsal tunnel, anticipate RLE EMG to assess foot if condition does not improve, and instructed the patient to continue activity modification as directed. [employer] company would only approve foot MRI, not ankle or HF MRI. Since we saw her last, Ms. Destin Mane states that she is still having weakness in her toes and ankles. She recalls twisting her foot yesterday while walking in the rain. She finds difficulty with walking. The patient denies any recent falls, twists, or trauma.  She reports that her right foot frequently gives out with prolonged walking and even with wearing regular sneakers. I personally reviewed the MRI results with the patient and it demonstrated unremarkable findings. Visit Vitals  BP (!) 87/69 (BP 1 Location: Right arm, BP Patient Position: Sitting)   Pulse 76   Temp 97.6 °F (36.4 °C) (Oral)   Resp 16   Ht 5' 5\" (1.651 m)   Wt 199 lb (90.3 kg)   SpO2 100%   BMI 33.12 kg/m²       Appearance: Alert, well appearing and pleasant patient who is in no distress, oriented to person, place/time, and who follows commands. This patient is accompanied in the examination room by her self. Dementia: no dementia  Psychiatric: Affect and mood are appropriate. Patient arrives to office via: without assistive device:    HEENT: Head normocephalic & atraumatic. Both pupils are round, non icteric sclera   Eye: EOM are intact and sclera are clear, yellow hue    Neck: ROM WNL and JVD neck is not present     Hearings Intact, does not require hearing aid device  Respiratory: Breathing is unlabored without accessory chest muscle use  Cardiovascular/Peripheral Vascular: Normal Pulses to each foot    ANKLE/FOOT right    Gait: Normal  Tenderness: Mild tenderness to the plantar fascia  central heel   Tenderness to abductor hallux muscle  Cutaneous:   WNL. Joint Motion:  WNL to ankle. Joint / Tendon Stability: No Ankle or Subtalar instability or joint laxity. No peroneal sublux ability or dislocation  Alignment: Forefoot, Midfoot, Hindfoot WNL. Neuro Motor/Sensory: NL ankle DF/PF . [place] 4/5+ eversion and /NL sensation  Vascular: NL foot/ankle pulses. Lymphatics: No extremity lymphedema, No calf swelling, no tenderness to calf muscles.       CHART REVIEW     Past Medical History:   Diagnosis Date    Adverse effect of anesthesia     Anxiety     Endometriosis     EtOH dependence ([facility] [address]     Patient reports daily Vodka consumption for years with withdrawal symptoms in the past    GERD (gastroesophageal reflux disease)     Ill-defined condition meningitis    Liver mass 2016    small liver mass found per pt during last hospital stay   [facility]     Peptic ulcer     Renal cyst     Routine lab draw     Patient reports that vein finder may be required    Seizures ([address]     due to alcohol withdrawal    Severe dysplasia of cervix     Urine frequency     Withdrawal symptoms, alcohol (HCC)      Current Outpatient Medications   Medication Sig    nicotine (NICODERM CQ) 21 mg/24 hr     levETIRAcetam (KEPPRA) 500 mg tablet     Desvenlafaxine 100 mg Tb24     cloNIDine HCL (CATAPRES) 0.1 mg tablet Take  by mouth two (2) times a day.  SUMAtriptan (IMITREX) 100 mg tablet TAKE 1 TABLET BY MOUTH AS NEEDED FOR MIGRAINE. MAX ONE TABLET PER DAY    oxybutynin chloride XL (DITROPAN XL) 10 mg CR tablet Take 1 Tab by mouth daily.  hydrOXYzine pamoate (VISTARIL) 100 mg capsule Take 100 mg by mouth three (3) times daily as needed for Itching. Atarax    OXcarbazepine (TRILEPTAL) 150 mg tablet Take  by mouth two (2) times a day.  promethazine (PHENERGAN) 12.5 mg tablet TAKE 1 TABLET BY MOUTH 4 TIMES DAILY    pantoprazole (PROTONIX) 40 mg tablet TAKE 1 TABLET BY MOUTH ONCE DAILY    Omeprazole delayed release (PRILOSEC D/R) 20 mg tablet Take 1 Tab by mouth daily.  topiramate (TOPAMAX) 25 mg tablet Take 1 Tab by mouth two (2) times a day.  mirtazapine (REMERON) 15 mg tablet TAKE 1 TABLET BY MOUTH EVERY DAY AT BEDTIME    naltrexone (DEPADE) 50 mg tablet TAKE 1 TABLET BY MOUTH ONCE DAILY    propranolol (INDERAL) 20 mg tablet Take  by mouth two (2) times a day.  gabapentin (NEURONTIN) 600 mg tablet Take  by mouth three (3) times daily.  QUEtiapine (SEROQUEL) 25 mg tablet Take 100 mg by mouth nightly as needed.  traZODone (DESYREL) 100 mg tablet Take 100 mg by mouth nightly. No current facility-administered medications for this visit.       Allergies   Allergen Reactions    Seafood Anaphylaxis     Pt is ok with iodine contrast. Patient is allergic to shellfish.  Shellfish Derived Anaphylaxis    Codeine Hives    Penicillins Hives     Past Surgical History:   Procedure Laterality Date    COLPOSCOPY      x 2    HX CHOLECYSTECTOMY      HX GYN      colposcopy d and c    HX LEEP PROCEDURE      HX OTHER SURGICAL      LIVER SURGERY PROCEDURE UNLISTED  2004    \"Half of liver removed\"      Social History     Occupational History    Not on file   Tobacco Use    Smoking status: Current Every Day Smoker     Years: 22.00    Smokeless tobacco: Never Used    Tobacco comment: 3 or 4 cigarettes per day. Substance and Sexual Activity    Alcohol use: Not Currently    Drug use: Not Currently     Types: Marijuana     Comment: Last used on 10/12/19.  Sexual activity: Yes     Partners: Male     Birth control/protection: None     Family History   Problem Relation Age of Onset    Cancer Mother         Cervical and uterine cancer at age 39    Hypertension Mother     COPD Mother     Hypertension Father     Kidney Disease Maternal Aunt     Kidney Disease Maternal Uncle     Hypertension Maternal Grandmother     Other Other         Gallbladder problems and kidnsy stones problems on mom side        REVIEW OF SYSTEMS : 2/12/2020  ALL BELOW ARE Negative except : SEE HPI         REVIEW OF SYSTEMS : Total of 12 systems reviewed as follows:            CONSTITUTIONAL: No weight loss. PSYCHOLOGICAL : No Feelings of anxiety, depression, agitation  EYES: No blurred vision and no eye discharge. NO eye pain, double vision  ENT: No nasal discharge. No ear pain. CARDIOVASCULAR: No chest pain and no diaphoresis. RESPIRATORY: No cough, no hemoptysis. GI: No vomiting, no diarrhea   : No urinary frequency and no dysuria. MUSCULOSKELETAL: see HPI  SKIN: No rashes. NEURO: Negative for : dizziness,weakness, headaches     Negative for : visual changes or confusion, or seizures,   ENDOCRINE: No polyphagia and no polydipsia. HEMATOLOGY: No bleeding tendencies. DIAGNOSTIC IMAGING     No notes on file    MRI Results (most recent):  Results from Hospital Encounter encounter on 02/06/20   MRI FOOT RT WO CONT    Narrative Procedure: MRI of the right foot without contrast.    Indications: Pain over the past one month without reported injury. Evaluate  bunion. Technique: Multi-sequence multiplanar MR imaging obtained centered on the right  foot. Comparisons: None    Findings:  Bones/joints: Moderate hallux valgus of about 26 degrees. Bunion formation. Small amount of patchy marrow edema within the medial metatarsal head. No  fracture or erosion. There is some deep cartilage thinning towards the more  plantar 1st metatarsal head. There is mild to moderate lateral subluxation of the sesamoids accompanying the  hallux valgus. Mild stippled marrow edema within the medial sesamoid. No  fracture. Other bony structures are unremarkable. Soft tissues: Lisfranc ligament intact. Regional musculotendinous structures are  intact. Other findings: None      Impression IMPRESSION[de-identified]    1. Moderate hallux valgus with some marrow edema and mild regional degenerative  findings as outlined above. Thank you for enabling us to participate in the care of this patient. Please see above section of this report. I have personally reviewed the results of the above study. The interpretation of this study is my professional opinion. Written by Marci Saldivar, as dictated by Dr. Roma Newman. I, Dr. Roma Newman, confirm that all documentation is accurate.

## 2020-02-19 RX ORDER — SUMATRIPTAN 100 MG/1
TABLET, FILM COATED ORAL
Qty: 9 TAB | Refills: 0 | Status: SHIPPED | OUTPATIENT
Start: 2020-02-19 | End: 2020-03-04 | Stop reason: SDUPTHER

## 2020-02-19 NOTE — TELEPHONE ENCOUNTER
Requested Prescriptions     Pending Prescriptions Disp Refills    SUMAtriptan (IMITREX) 100 mg tablet 9 Tab 0     Patient called in request. She states she is out of medication and that it isn't helping that much. Patient also stated she is trying to r/s her EEG to before her next appt on 2/25. She asked if she should have the EEG done before coming back for her f/u?  Please advise

## 2020-02-20 ENCOUNTER — HOSPITAL ENCOUNTER (OUTPATIENT)
Dept: LAB | Age: 40
Discharge: HOME | End: 2020-02-20

## 2020-02-20 LAB — XX-LABCORP SPECIMEN COL,LCBCF: NORMAL

## 2020-02-20 PROCEDURE — 99001 SPECIMEN HANDLING PT-LAB: CPT

## 2020-02-27 ENCOUNTER — HOSPITAL ENCOUNTER (OUTPATIENT)
Dept: NEUROLOGY | Age: 40
Discharge: HOME | End: 2020-02-27
Attending: PSYCHIATRY & NEUROLOGY
Payer: MEDICAID

## 2020-02-27 DIAGNOSIS — R56.9 SEIZURES (HCC): ICD-10-CM

## 2020-02-27 PROCEDURE — 95819 EEG AWAKE AND ASLEEP: CPT

## 2020-02-28 ENCOUNTER — OFFICE VISIT (OUTPATIENT)
Dept: NEUROLOGY | Age: 40
End: 2020-02-28

## 2020-02-28 VITALS
WEIGHT: 206 LBS | RESPIRATION RATE: 20 BRPM | SYSTOLIC BLOOD PRESSURE: 128 MMHG | BODY MASS INDEX: 34.32 KG/M2 | DIASTOLIC BLOOD PRESSURE: 88 MMHG | TEMPERATURE: 98.1 F | OXYGEN SATURATION: 100 % | HEIGHT: 65 IN | HEART RATE: 67 BPM

## 2020-02-28 DIAGNOSIS — G43.109 MIGRAINE WITH AURA AND WITHOUT STATUS MIGRAINOSUS, NOT INTRACTABLE: ICD-10-CM

## 2020-02-28 RX ORDER — TOPIRAMATE 50 MG/1
150 TABLET, FILM COATED ORAL 2 TIMES DAILY
Qty: 180 TAB | Refills: 5 | Status: SHIPPED | OUTPATIENT
Start: 2020-02-28 | End: 2020-03-16

## 2020-02-28 NOTE — LETTER
2/28/2020 2:05 PM 
 
Patient:  Kath Forte YOB: 1980 Date of Visit: 2/28/2020 Dear Marce Michelle PA-C 
[facility] [address] [phone] VIA In Basket 
 : Thank you for referring Ms. Alo Flower to me for evaluation/treatment. Below are the relevant portions of my assessment and plan of care. If you have questions, please do not hesitate to call me. I look forward to following Ms. Kaylen Flower along with you.  
 
 
 
Sincerely, 
 
 
Judi Rosenberg MD

## 2020-02-28 NOTE — PROGRESS NOTES
Re:  Moon Crowell up visit     2/28/2020 1:50 PM    SSN: [id number]    Subjective:   Alo Monk returns for follow up. She's been in recovery since November 2019. She's been in a recovery house since that time, completely alcohol free. She has been sent back because of pain in the left leg from her low back radiating down the back of the leg. She also has discomfort in the right foot that has persisted. She's scheduled to have an EMG for some right foot movement decrease. She's not had any seizures recently, not drinking at this time. She may have woken up with some cheek biting. She has daily headaches when she wakes up. Medications:    Current Outpatient Medications   Medication Sig Dispense Refill    SUMAtriptan (IMITREX) 100 mg tablet TAKE 1 TABLET BY MOUTH AS NEEDED FOR MIGRAINE. MAX ONE TABLET PER DAY 9 Tab 0    levETIRAcetam (KEPPRA) 500 mg tablet       Desvenlafaxine 100 mg Tb24       cloNIDine HCL (CATAPRES) 0.1 mg tablet Take  by mouth two (2) times a day.  oxybutynin chloride XL (DITROPAN XL) 10 mg CR tablet Take 1 Tab by mouth daily. 30 Tab 11    OXcarbazepine (TRILEPTAL) 150 mg tablet Take  by mouth two (2) times a day.  promethazine (PHENERGAN) 12.5 mg tablet TAKE 1 TABLET BY MOUTH 4 TIMES DAILY  0    pantoprazole (PROTONIX) 40 mg tablet TAKE 1 TABLET BY MOUTH ONCE DAILY  1    topiramate (TOPAMAX) 25 mg tablet Take 1 Tab by mouth two (2) times a day. 60 Tab 2    mirtazapine (REMERON) 15 mg tablet TAKE 1 TABLET BY MOUTH EVERY DAY AT BEDTIME  2    naltrexone (DEPADE) 50 mg tablet TAKE 1 TABLET BY MOUTH ONCE DAILY  1    propranolol (INDERAL) 20 mg tablet Take  by mouth two (2) times a day.  gabapentin (NEURONTIN) 600 mg tablet Take  by mouth three (3) times daily.  QUEtiapine (SEROQUEL) 25 mg tablet Take 100 mg by mouth nightly as needed.  traZODone (DESYREL) 100 mg tablet Take 100 mg by mouth nightly.       nicotine (NICODERM CQ) 21 mg/24 hr       hydrOXYzine pamoate (VISTARIL) 100 mg capsule Take 100 mg by mouth three (3) times daily as needed for Itching. Atarax      Omeprazole delayed release (PRILOSEC D/R) 20 mg tablet Take 1 Tab by mouth daily. 30 Tab 0       Vital signs:    Visit Vitals  /88 (BP 1 Location: Left arm, BP Patient Position: Sitting)   Pulse 67   Temp 98.1 °F (36.7 °C) (Oral)   Resp 20   Ht 5' 5\" (1.651 m)   Wt 93.4 kg (206 lb)   LMP 02/09/2020 (Exact Date)   SpO2 100%   BMI 34.28 kg/m²       Review of Systems:   As above otherwise 11 point review of systems negative including;   Constitutional no fever or chills  Skin denies rash or itching  HEENT  Denies tinnitus, hearing lose  Eyes denies diplopia vision lose  Respiratory denies sortness of breath  Cardiovascular denies chest pain, dyspnea on exertion  Gastrointestinal denies nausea, vomiting, diarrhea, constipation  Genitourinary denies incontinence  Musculoskeletal denies joint pain or swelling  Endocrine denies weight change  Hematology denies easy bruising or bleeding   Neurological as above in HPI      Patient Active Problem List   Diagnosis Code    Abdominal pain R10.9    Hx of bipolar disorder Z86.59    Pancreatitis K85.90    Alcohol abuse F10.10    Alcohol-induced chronic pancreatitis ([facility] [address] K86.0    Malingering Z76.5    Neuropathy G62.9    Anxiety and depression F41.9, F32.9    Seizure disorder ([facility] [address] G40.909         Objective: The patient is awake, alert, and oriented x 4. Fund of knowledge is adequate. Speech is fluent and memory is intact. Cranial Nerves: II  Visual fields are full to confrontation. III, IV, VI  Extraocular movements are intact. There is no nystagmus. V  Facial sensation is intact to pinprick. VII  Face is symmetrical.  VIII - Hearing is present. IX, X, XII  Palate is symmetrical.   XI - Shoulder shrugging and head turning intact  Motor:   The patient moves all four limbs fairly well and symmetrically. Tone is normal. Reflexes are 2+ and symmetrical. Plantars are down going. Gait is abnormal, limps off of the right foot. CBC:   Lab Results   Component Value Date/Time    WBC 5.1 11/29/2019 01:11 AM    RBC 4.36 11/29/2019 01:11 AM    HGB 13.2 11/29/2019 01:11 AM    HCT 37.9 11/29/2019 01:11 AM    PLATELET 179 09/87/6776 01:11 AM     BMP:   Lab Results   Component Value Date/Time    Glucose 100 (H) 11/29/2019 01:11 AM    Sodium 138 11/29/2019 01:11 AM    Potassium 4.4 11/29/2019 01:11 AM    Chloride 107 11/29/2019 01:11 AM    CO2 25 11/29/2019 01:11 AM    BUN 16 11/29/2019 01:11 AM    Creatinine 1.05 11/29/2019 01:11 AM    Calcium 9.2 11/29/2019 01:11 AM     CMP:   Lab Results   Component Value Date/Time    Glucose 100 (H) 11/29/2019 01:11 AM    Sodium 138 11/29/2019 01:11 AM    Potassium 4.4 11/29/2019 01:11 AM    Chloride 107 11/29/2019 01:11 AM    CO2 25 11/29/2019 01:11 AM    BUN 16 11/29/2019 01:11 AM    Creatinine 1.05 11/29/2019 01:11 AM    Calcium 9.2 11/29/2019 01:11 AM    Anion gap 6 11/29/2019 01:11 AM    BUN/Creatinine ratio 15 11/29/2019 01:11 AM    Alk. phosphatase 117 11/29/2019 01:11 AM    Protein, total 9.0 (H) 11/29/2019 01:11 AM    Albumin 3.9 11/29/2019 01:11 AM    Globulin 5.1 (H) 11/29/2019 01:11 AM    A-G Ratio 0.8 11/29/2019 01:11 AM     Coagulation:   Lab Results   Component Value Date/Time    Prothrombin time 13.1 10/21/2019 12:38 PM    INR 1.0 10/21/2019 12:38 PM     Cardiac markers: No results found for: CPK, CKND1, CARINA    Assessment:  Right foot pain, left sided sciatica. Also has chronic daily headaches. Seizures from alcohol withdrawal, seems stable now that she's not drinking. Plan:  First step is to try to help with her headaches, so will increase the Topamax to 150 mg BID. EMG scheduled for her legs. Stop Keppra as unneeded. RTC in 4 weeks. Sincerely,        Marco A Tamayo.  Danette Rodrigues M.D.

## 2020-03-04 ENCOUNTER — HOSPITAL ENCOUNTER (OUTPATIENT)
Dept: ULTRASOUND IMAGING | Age: 40
Discharge: HOME | End: 2020-03-04
Attending: PHYSICIAN ASSISTANT
Payer: MEDICAID

## 2020-03-04 DIAGNOSIS — F17.200 TOBACCO DEPENDENCE: ICD-10-CM

## 2020-03-04 DIAGNOSIS — R63.4 WEIGHT LOSS: ICD-10-CM

## 2020-03-04 DIAGNOSIS — R94.5 NONSPECIFIC ABNORMAL RESULTS OF LIVER FUNCTION STUDY: ICD-10-CM

## 2020-03-04 DIAGNOSIS — R03.0 ELEVATED BLOOD PRESSURE READING WITHOUT DIAGNOSIS OF HYPERTENSION: ICD-10-CM

## 2020-03-04 DIAGNOSIS — R93.89 ABNORMAL CT SCAN: ICD-10-CM

## 2020-03-04 DIAGNOSIS — R11.0 NAUSEA: ICD-10-CM

## 2020-03-04 DIAGNOSIS — R19.00 SWOLLEN ABDOMEN: ICD-10-CM

## 2020-03-04 DIAGNOSIS — F10.10 ALCOHOL ABUSE, CONTINUOUS: ICD-10-CM

## 2020-03-04 DIAGNOSIS — K21.9 ESOPHAGEAL REFLUX: ICD-10-CM

## 2020-03-04 DIAGNOSIS — R10.13 ABDOMINAL PAIN, EPIGASTRIC: ICD-10-CM

## 2020-03-04 PROCEDURE — 76700 US EXAM ABDOM COMPLETE: CPT

## 2020-03-05 RX ORDER — SUMATRIPTAN 100 MG/1
TABLET, FILM COATED ORAL
Qty: 9 TAB | Refills: 0 | Status: SHIPPED | OUTPATIENT
Start: 2020-03-05 | End: 2020-04-27

## 2020-03-13 DIAGNOSIS — G57.91 NEURITIS OF FOOT, RIGHT: ICD-10-CM

## 2020-03-14 NOTE — PROCEDURES
[facility]  EEG    Name:  Jony Viera  MR#:   [id number]  :  1980  ACCOUNT #:  [de-identified]  DATE OF SERVICE:  2020    OUTPATIENT EEG    REFERRING PROVIDER:  Ordered by Johanny Alonso MD    EEG Number:  MV EEG     REASON FOR EEG:  For evaluation of seizure. EEG TECHNIQUE USED:  Standard 10-20 system with 20-channel recording. Activation procedures used were photic and hyperventilation. The patient was mostly in drowsy and sleep states. Total duration of the recording was 32 minutes. EEG REPORT:  The background consists of a posterior dominant alpha at 8-9 Hz and attenuates to eye opening. No marked asymmetry between the right and left sides. There are no obvious spike or sharp wave discharges, and the patient was mostly in drowsy or sleep state and there are vertex waves, sleep spindles, and K-complexes. Hyperventilation did not induce any additional discharges. IMPRESSION:  This is a normal EEG, mostly in drowsy and sleep state. CLINICAL CORRELATION:  A normal EEG does not rule out the possibility of seizures or epilepsy.       MD CECILY [id number]  D:  2020 16:06  T:  2020 23:46  JOB #:  [id number]

## 2020-03-16 ENCOUNTER — OFFICE VISIT (OUTPATIENT)
Dept: NEUROLOGY | Age: 40
End: 2020-03-16

## 2020-03-16 VITALS
OXYGEN SATURATION: 99 % | SYSTOLIC BLOOD PRESSURE: 110 MMHG | BODY MASS INDEX: 34.16 KG/M2 | RESPIRATION RATE: 20 BRPM | HEART RATE: 66 BPM | DIASTOLIC BLOOD PRESSURE: 80 MMHG | WEIGHT: 205 LBS | HEIGHT: 65 IN | TEMPERATURE: 98.2 F

## 2020-03-16 DIAGNOSIS — G43.109 MIGRAINE WITH AURA AND WITHOUT STATUS MIGRAINOSUS, NOT INTRACTABLE: ICD-10-CM

## 2020-03-16 RX ORDER — METHYLPREDNISOLONE 4 MG/1
TABLET ORAL
Qty: 1 DOSE PACK | Refills: 0 | Status: SHIPPED | OUTPATIENT
Start: 2020-03-16

## 2020-03-16 RX ORDER — TOPIRAMATE 200 MG/1
200 TABLET ORAL 2 TIMES DAILY
Qty: 60 TAB | Refills: 5 | Status: SHIPPED | OUTPATIENT
Start: 2020-03-16

## 2020-03-16 NOTE — PROGRESS NOTES
Re:  Chandu Devries up visit     3/16/2020 10:57 AM    SSN: [id number]    Subjective:   Juancarlos Armando returns for follow up. She's been in recovery since November 2019. She's been in a recovery house since that time, completely alcohol free. She's not had any seizures recently, not drinking at this time. She may have woken up with some cheek biting. She had been headache free till about 3 days ago, Topamax seems to help. Medications:    Current Outpatient Medications   Medication Sig Dispense Refill    SUMAtriptan (IMITREX) 100 mg tablet TAKE 1 TABLET BY MOUTH AS NEEDED FOR MIGRAINE. MAX ONE TABLET PER DAY 9 Tab 0    topiramate (TOPAMAX) 50 mg tablet Take 3 Tabs by mouth two (2) times a day. Indications: migraine prevention 180 Tab 5    nicotine (NICODERM CQ) 21 mg/24 hr       Desvenlafaxine 100 mg Tb24       cloNIDine HCL (CATAPRES) 0.1 mg tablet Take  by mouth two (2) times a day.  oxybutynin chloride XL (DITROPAN XL) 10 mg CR tablet Take 1 Tab by mouth daily. 30 Tab 11    hydrOXYzine pamoate (VISTARIL) 100 mg capsule Take 100 mg by mouth three (3) times daily as needed for Itching. Atarax      OXcarbazepine (TRILEPTAL) 150 mg tablet Take  by mouth two (2) times a day.  promethazine (PHENERGAN) 12.5 mg tablet TAKE 1 TABLET BY MOUTH 4 TIMES DAILY  0    pantoprazole (PROTONIX) 40 mg tablet TAKE 1 TABLET BY MOUTH ONCE DAILY  1    Omeprazole delayed release (PRILOSEC D/R) 20 mg tablet Take 1 Tab by mouth daily. 30 Tab 0    mirtazapine (REMERON) 15 mg tablet TAKE 1 TABLET BY MOUTH EVERY DAY AT BEDTIME  2    naltrexone (DEPADE) 50 mg tablet TAKE 1 TABLET BY MOUTH ONCE DAILY  1    propranolol (INDERAL) 20 mg tablet Take  by mouth two (2) times a day.  gabapentin (NEURONTIN) 600 mg tablet Take  by mouth three (3) times daily.  QUEtiapine (SEROQUEL) 25 mg tablet Take 100 mg by mouth nightly as needed.       traZODone (DESYREL) 100 mg tablet Take 100 mg by mouth nightly. Vital signs:    Visit Vitals  /80 (BP 1 Location: Left arm, BP Patient Position: Sitting)   Pulse 66   Temp 98.2 °F (36.8 °C) (Oral)   Resp 20   Ht 5' 5\" (1.651 m)   Wt 93 kg (205 lb)   LMP 03/08/2020 (Exact Date)   SpO2 99%   BMI 34.11 kg/m²       Review of Systems:   As above otherwise 11 point review of systems negative including;   Constitutional no fever or chills  Skin denies rash or itching  HEENT  Denies tinnitus, hearing lose  Eyes denies diplopia vision lose  Respiratory denies sortness of breath  Cardiovascular denies chest pain, dyspnea on exertion  Gastrointestinal denies nausea, vomiting, diarrhea, constipation  Genitourinary denies incontinence  Musculoskeletal denies joint pain or swelling  Endocrine denies weight change  Hematology denies easy bruising or bleeding   Neurological as above in HPI      Patient Active Problem List   Diagnosis Code    Abdominal pain R10.9    Hx of bipolar disorder Z86.59    Pancreatitis K85.90    Alcohol abuse F10.10    Alcohol-induced chronic pancreatitis ([facility] [address] K86.0    Malingering Z76.5    Neuropathy G62.9    Anxiety and depression F41.9, F32.9    Seizure disorder ([facility] [address] G40.909         Objective: The patient is awake, alert, and oriented x 4. Fund of knowledge is adequate. Speech is fluent and memory is intact. Cranial Nerves: II  Visual fields are full to confrontation. III, IV, VI  Extraocular movements are intact. There is no nystagmus. V  Facial sensation is intact to pinprick. VII  Face is symmetrical.  VIII - Hearing is present. IX, X, XII  Palate is symmetrical.   XI - Shoulder shrugging and head turning intact  Motor: The patient moves all four limbs fairly well and symmetrically. Tone is normal. Reflexes are 2+ and symmetrical. Plantars are down going. Gait is abnormal, limps off of the right foot.     CBC:   Lab Results   Component Value Date/Time    WBC 5.1 11/29/2019 01:11 AM    RBC 4.36 2019 01:11 AM    HGB 13.2 2019 01:11 AM    HCT 37.9 2019 01:11 AM    PLATELET 054  01:11 AM     BMP:   Lab Results   Component Value Date/Time    Glucose 100 (H) 2019 01:11 AM    Sodium 138 2019 01:11 AM    Potassium 4.4 2019 01:11 AM    Chloride 107 2019 01:11 AM    CO2 25 2019 01:11 AM    BUN 16 2019 01:11 AM    Creatinine 1.05 2019 01:11 AM    Calcium 9.2 2019 01:11 AM     CMP:   Lab Results   Component Value Date/Time    Glucose 100 (H) 2019 01:11 AM    Sodium 138 2019 01:11 AM    Potassium 4.4 2019 01:11 AM    Chloride 107 2019 01:11 AM    CO2 25 2019 01:11 AM    BUN 16 2019 01:11 AM    Creatinine 1.05 2019 01:11 AM    Calcium 9.2 2019 01:11 AM    Anion gap 6 2019 01:11 AM    BUN/Creatinine ratio 15 2019 01:11 AM    Alk. phosphatase 117 2019 01:11 AM    Protein, total 9.0 (H) 2019 01:11 AM    Albumin 3.9 2019 01:11 AM    Globulin 5.1 (H) 2019 01:11 AM    A-G Ratio 0.8 2019 01:11 AM     Coagulation:   Lab Results   Component Value Date/Time    Prothrombin time 13.1 10/21/2019 12:38 PM    INR 1.0 10/21/2019 12:38 PM     Cardiac markers: No results found for: CPK, CKND1, [address] [facility]  EEG     Name:  Tony Phillip  MR#:   [id number]  :  1980  ACCOUNT #:  [de-identified]  DATE OF SERVICE:  2020     OUTPATIENT EEG     REFERRING PROVIDER:  Ordered by Nicolle Soliman MD     EEG Number:  MV EEG      REASON FOR EEG:  For evaluation of seizure.     EEG TECHNIQUE USED:  Standard 10-20 system with 20-channel recording. Activation procedures used were photic and hyperventilation. The patient was mostly in drowsy and sleep states. Total duration of the recording was 32 minutes.     EEG REPORT:  The background consists of a posterior dominant alpha at 8-9 Hz and attenuates to eye opening.   No marked asymmetry between the right and left sides. There are no obvious spike or sharp wave discharges, and the patient was mostly in drowsy or sleep state and there are vertex waves, sleep spindles, and K-complexes. Hyperventilation did not induce any additional discharges.     IMPRESSION:  This is a normal EEG, mostly in drowsy and sleep state.     CLINICAL CORRELATION:  A normal EEG does not rule out the possibility of seizures or epilepsy.        JANET TREVINO MD    Assessment:  Right foot pain, left sided sciatica. Also has chronic daily headaches. Seizures from alcohol withdrawal, seems stable now that she's not drinking. Plan:  Try to help with her headaches, so will increase the Topamax to 200 mg BID. RTC in 4 weeks. Medrol dose pack      Sincerely,        Lola Zhou M.D.

## 2020-03-16 NOTE — PROGRESS NOTES
Hope Rudolph is a 37 y.o. female in today for follow-up on migraines. Patient reports active head and low back pain 8/10. EMG/NCV ordered by Porter Gonzales MD completed by Carol Rousseau MD on 3/9/2020.    1. Have you been to the ER, urgent care clinic since your last visit? Hospitalized since your last visit? No    2. Have you seen or consulted any other health care providers outside of the [address] [place] [address] since your last visit? Include any pap smears or colon screening.  No

## 2020-03-17 ENCOUNTER — TELEPHONE (OUTPATIENT)
Dept: ORTHOPEDIC SURGERY | Age: 40
End: 2020-03-17

## 2020-03-17 NOTE — TELEPHONE ENCOUNTER
Patient called due to the need for rescheduling her appt. She is not happy that she cannot see anyone and needs to get EMG results. She is asking if we can deliver this via the phone. She is on a lot of pain and is upset that she is not being seen.   Patient [phone]

## 2020-06-23 ENCOUNTER — OFFICE VISIT (OUTPATIENT)
Dept: ORTHOPEDIC SURGERY | Age: 40
End: 2020-06-23

## 2020-06-23 VITALS
TEMPERATURE: 97 F | HEIGHT: 65 IN | SYSTOLIC BLOOD PRESSURE: 118 MMHG | OXYGEN SATURATION: 97 % | HEART RATE: 83 BPM | DIASTOLIC BLOOD PRESSURE: 73 MMHG | WEIGHT: 202 LBS | BODY MASS INDEX: 33.66 KG/M2

## 2020-06-23 DIAGNOSIS — R29.898 WEAKNESS OF FOOT, RIGHT: Primary | ICD-10-CM

## 2020-06-23 NOTE — PROGRESS NOTES
AMBULATORY PROGRESS NOTE      Patient: Julia Allen             MRN: [id number]     SSN: [id number] Body mass index is 33.61 kg/m². YOB: 1980     AGE: 37 y.o. SEX: female    PCP: Satinder Florentino PA-C     IMPRESSION/DIAGNOSIS AND TREATMENT PLAN     DIAGNOSES    1. Weakness of foot, right        Orders Placed This Encounter    Generic Supply Order    Generic Supply Order    REFERRAL TO PHYSICAL THERAPY      Alo Meyer understands her diagnoses and the proposed plan. It is my professional opinion that Julia Allen has an Orthopaedic Diagnosis of     ICD-10-CM ICD-9-CM    1. Weakness of foot, right R29.898 734 REFERRAL TO PHYSICAL THERAPY      AMB SUPPLY ORDER      AMB SUPPLY ORDER      . Julia Allen has:     and Julia Allen is now in need of, [facility] NEUROLOGY CONSULT/EMG-NCV'S DUE TO HER WEKNES OF HER TOES AND RECENT H/O FREQUENT FALLS DUE TO RIGHT ANKLE WEAKNESS* in order to assess [facility] [address]       Izabella Fall MD, has ordered a custom brace or DME product for you. This will be customized and made for you by an outside facility. I am requesting that you contact the Orthotist company provided below in order to have the prescription made. Julia Allen is in need of CUSTOM   Right     1. Unilateral,  SMO Right    2. GOAL OF TREATMENT TO: to provide M/L stability, optimal arch support, and limit ML/AP motion. Alo Lai Midget needs tri planar control (Medial / Lateral stability, optimal arch support, and limited Medial/Lateral // Anterior/Posterior Motion) of the foot and ankle in order to improve anatomical alignment, protect/control motion, and to relieve pain. I think she will benefit from a SMO type brace, least protect her hindfoot. She is a bunion deformity, as well, and has been having pain discomfort to her right bunion. Her surgery a year ago was canceled, due to her high alcoholism.   Please make sure she is okay with her primary care doctor is okay with her having reconstructive surgery on her right foot, to get her bunion deformity. If she is to have bunion surgery, soon, then we need to hold off on the [facility] brace, because an SLO brace would not fit her well immediately postop from her bunion surgery. It is to be recalled she is been having disabling pain discomfort, and her bunion, for at least 18 months now, but cannot have surgery due to her alcoholism. She states been sober, from alcohol for the last 7 months now. Her EMG nerve conduction studies, were normal.  As he is recollected by Dr. guo, on March 9, 2020. The EMGs were within normal limits. Normal motor and sensory nerve conductions were noted to the right lower extremity including the right medial and lateral plantar nerve sensory nerves. The motor conduction velocities in the H reflexes were within normal limits. The needle EMG of the right lower extremities proximally distally did not show any abnormalities. Plan:    1. Referral to PT to assess ankle motion; gait analysis and treadmill exercise to determine if we an induce ankle instability  2. DME order: custom right extended SMO brace ()  3. DME order: right AS/AC ankle brace  4. Contact Hiwot Garcia for surgical scheduling, [phone]  5. Consult with Arben Reid PA-C the patient's PCP to determine if its okay to do with surgery. RTO-aperture confirms that it is okay to have surgery. HPI AND EXAMINATION     Alo Matt IS A 37 y.o. female who presents to my outpatient office for a follow up  REASSESSMENT of her right ankle weakness. At the last visit, I provided a referral to Dr. Xavi Holt for an EMG nerve study and ordered a right AS/AC ankle brace. Since we saw her last, Ms. Jennifer Tamayo reports that she is still experiencing the same severe weakness in her right ankle.  She also notes that her bunion has been giving her some discomfort. I personally reviewed the MRI results with the patient and it demonstrated unremarkable findings. On a typical day, the pt reports that her ankle often gives out while she is walking. The pt also has a chief complaint of bunion pain in her right great toe. She has been experiencing significant pain and wishes to have surgery. The pt denies any back radiculopathy. The pt has hx of a right ankle sprain back in 2014. The pt states that she is currently 7 months sober. The pt currently works at a [employer]; which is mostly sedentary. Visit Vitals  /73   Pulse 83   Temp 97 °F (36.1 °C) (Skin)   Ht 5' 5\" (1.651 m)   Wt 202 lb (91.6 kg)   SpO2 97%   BMI 33.61 kg/m²       Appearance: Alert, well appearing and pleasant patient who is in no distress, oriented to person, place/time, and who follows commands. This patient is accompanied in the examination room by her self. Dementia: no dementia  Psychiatric: Affect and mood are appropriate. Patient arrives to office via: without assistive device:    HEENT: Head normocephalic & atraumatic. Both pupils are round, non icteric sclera   Eye: EOM are intact and sclera are clear, yellow hue    Neck: ROM WNL and JVD neck is not present     Hearings Intact, does not require hearing aid device  Respiratory: Breathing is unlabored without accessory chest muscle use  Cardiovascular/Peripheral Vascular: Normal Pulses to each foot    ANKLE/FOOT right    Gait: Normal  Tenderness: Mild tenderness to the plantar fascia  central heel   Tenderness to abductor hallux muscle    Mild to Anterolateral and posterateral ankle    Mild to peroneal tendons  Cutaneous: Moderate bunion  Joint Motion:  WNL to ankle. Joint / Tendon Stability: No Ankle or Subtalar instability or joint laxity. No peroneal sublux ability or dislocation  Alignment: Forefoot, Midfoot, Hindfoot WNL. Neuro Motor/Sensory: NL ankle DF/PF . Juju   4/5+ eversion and /NL sensation  Vascular: NL foot/ankle pulses. Lymphatics: No extremity lymphedema, No calf swelling, no tenderness to calf muscles. CHART REVIEW     Past Medical History:   Diagnosis Date    Adverse effect of anesthesia     Anxiety     Endometriosis     EtOH dependence ([facility] [address]     Patient reports daily Vodka consumption for years with withdrawal symptoms in the past    GERD (gastroesophageal reflux disease)     Ill-defined condition     meningitis    Liver mass 2016    small liver mass found per pt during last hospital stay   [place] Pancreatitis     Peptic ulcer     Renal cyst     Routine lab draw     Patient reports that vein finder may be required    Seizures ([facility] [address]     due to alcohol withdrawal    Severe dysplasia of cervix     Urine frequency     Withdrawal symptoms, alcohol (HCC)      Current Outpatient Medications   Medication Sig    SUMAtriptan (IMITREX) 100 mg tablet TAKE 1 TABLET BY MOUTH AS NEEDED FOR MIGRAINE HEADACHE . DO NOT EXCEED 1 ONCE DAILY    topiramate (TOPAMAX) 200 mg tablet Take 1 Tab by mouth two (2) times a day. Indications: migraine prevention    nicotine (NICODERM CQ) 21 mg/24 hr     Desvenlafaxine 100 mg Tb24     cloNIDine HCL (CATAPRES) 0.1 mg tablet Take  by mouth two (2) times a day.  pantoprazole (PROTONIX) 40 mg tablet TAKE 1 TABLET BY MOUTH ONCE DAILY    gabapentin (NEURONTIN) 600 mg tablet Take  by mouth three (3) times daily.  QUEtiapine (SEROQUEL) 25 mg tablet Take 100 mg by mouth nightly as needed.  traZODone (DESYREL) 100 mg tablet Take 100 mg by mouth nightly.  solifenacin (VESICARE) 10 mg tablet Take 1 Tab by mouth daily.  methylPREDNISolone (MEDROL DOSEPACK) 4 mg tablet As directed    hydrOXYzine pamoate (VISTARIL) 100 mg capsule Take 100 mg by mouth three (3) times daily as needed for Itching. Atarax    OXcarbazepine (TRILEPTAL) 150 mg tablet Take  by mouth two (2) times a day.     promethazine (PHENERGAN) 12.5 mg tablet TAKE 1 TABLET BY MOUTH 4 TIMES DAILY    Omeprazole delayed release (PRILOSEC D/R) 20 mg tablet Take 1 Tab by mouth daily.  mirtazapine (REMERON) 15 mg tablet TAKE 1 TABLET BY MOUTH EVERY DAY AT BEDTIME    naltrexone (DEPADE) 50 mg tablet TAKE 1 TABLET BY MOUTH ONCE DAILY    propranolol (INDERAL) 20 mg tablet Take  by mouth two (2) times a day. No current facility-administered medications for this visit. Allergies   Allergen Reactions    Seafood Anaphylaxis     Pt is ok with iodine contrast. Patient is allergic to shellfish.  Shellfish Derived Anaphylaxis    Codeine Hives    Penicillins Hives     Past Surgical History:   Procedure Laterality Date    COLPOSCOPY      x 2    HX CHOLECYSTECTOMY      HX GYN      colposcopy d and c    HX LEEP PROCEDURE      HX OTHER SURGICAL      LIVER SURGERY PROCEDURE UNLISTED  2004    \"Half of liver removed\"      Social History     Occupational History    Not on file   Tobacco Use    Smoking status: Light Tobacco Smoker     Years: 22.00    Smokeless tobacco: Never Used    Tobacco comment: 3 or 4 cigarettes per day. Substance and Sexual Activity    Alcohol use: Not Currently    Drug use: Not Currently     Types: Marijuana     Comment: Last used on 10/12/19.  Sexual activity: Yes     Partners: Male     Birth control/protection: None     Family History   Problem Relation Age of Onset    Cancer Mother         Cervical and uterine cancer at age 39    Hypertension Mother     COPD Mother     Hypertension Father     Kidney Disease Maternal Aunt     Kidney Disease Maternal Uncle     Hypertension Maternal Grandmother     Other Other         Gallbladder problems and kidnsy stones problems on mom side        REVIEW OF SYSTEMS : 6/23/2020  ALL BELOW ARE Negative except : SEE HPI         REVIEW OF SYSTEMS : Total of 12 systems reviewed as follows:            CONSTITUTIONAL: No weight loss.    PSYCHOLOGICAL : No Feelings of anxiety, depression, agitation  EYES: No blurred vision and no eye discharge. NO eye pain, double vision  ENT: No nasal discharge. No ear pain. CARDIOVASCULAR: No chest pain and no diaphoresis. RESPIRATORY: No cough, no hemoptysis. GI: No vomiting, no diarrhea   : No urinary frequency and no dysuria. MUSCULOSKELETAL: see HPI  SKIN: No rashes. NEURO: Negative for : dizziness,weakness, headaches     Negative for : visual changes or confusion, or seizures,   ENDOCRINE: No polyphagia and no polydipsia. HEMATOLOGY: No bleeding tendencies. DIAGNOSTIC IMAGING     No notes on file    MRI Results (most recent):  Results from Hospital Encounter encounter on 02/06/20   MRI FOOT RT WO CONT    Narrative Procedure: MRI of the right foot without contrast.    Indications: Pain over the past one month without reported injury. Evaluate  bunion. Technique: Multi-sequence multiplanar MR imaging obtained centered on the right  foot. Comparisons: None    Findings:  Bones/joints: Moderate hallux valgus of about 26 degrees. Bunion formation. Small amount of patchy marrow edema within the medial metatarsal head. No  fracture or erosion. There is some deep cartilage thinning towards the more  plantar 1st metatarsal head. There is mild to moderate lateral subluxation of the sesamoids accompanying the  hallux valgus. Mild stippled marrow edema within the medial sesamoid. No  fracture. Other bony structures are unremarkable. Soft tissues: Lisfranc ligament intact. Regional musculotendinous structures are  intact. Other findings: None      Impression IMPRESSION[de-identified]    1. Moderate hallux valgus with some marrow edema and mild regional degenerative  findings as outlined above. Thank you for enabling us to participate in the care of this patient. Please see above section of this report. I have personally reviewed the results of the above study. The interpretation of this study is my professional opinion.     Written by Kasey Gaines Marilynn Mayo, as dictated by Dr. Sridevi Brown. I, Dr. Sridevi Brown, confirm that all documentation is accurate.

## 2020-07-16 ENCOUNTER — HOSPITAL ENCOUNTER (OUTPATIENT)
Dept: PHYSICAL THERAPY | Age: 40
Discharge: HOME | End: 2020-07-16
Payer: MEDICAID

## 2020-07-16 PROCEDURE — 97162 PT EVAL MOD COMPLEX 30 MIN: CPT
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
## 2020-07-24 ENCOUNTER — APPOINTMENT (OUTPATIENT)
Dept: PHYSICAL THERAPY | Age: 40
End: 2020-07-24
Payer: MEDICAID

## 2020-07-28 ENCOUNTER — APPOINTMENT (OUTPATIENT)
Dept: PHYSICAL THERAPY | Age: 40
End: 2020-07-28
Payer: MEDICAID

## 2020-07-30 ENCOUNTER — APPOINTMENT (OUTPATIENT)
Dept: PHYSICAL THERAPY | Age: 40
End: 2020-07-30
Payer: MEDICAID

## 2020-08-25 NOTE — PROGRESS NOTES
Kecia Saleem 29  [facility] PHYSICAL THERAPY  [address] [facility], [address] - Phone: [phone]  Fax: [phone]      Dear Dr. Jamie Recinos MD,   Under your direction, we have been providing physical therapy for your patient Nan Garcias, for a diagnosis of Right foot pain [M79.671]. The patient was scheduled for four visits after her initial evaluation. Sh3 attended none of her follow up sessions, and was last seen on 7/16/2020 for her evaluation. She was diagnosed with COVID-19; however, after the patient was determined clear to participate in PT she no-showed to her remaining appointments. She has not communicated with us her intentions regarding PT. The patient cannot be contacted due to her voicemail box being full and her work phone being disconnected. Due to the inability to further her care from non-compliance to our attendance policy and POC, we are discharging the patient from physical therapy at this time. .     We appreciate the kind referral and would willingly work with this patient again, should she be able to arrange regular attendance and is appropriate for further treatment. Your patient's health is our primary concern. To ensure we are able to process the patients encounter and avoid risk of your patient receiving a bill for our services, please sign and return this discharge summary by 9/24/2020. (30days following DC date)    If you have any questions/comments please contact us directly at [phone]. Thank you for allowing us to assist in the care of your patient.     Therapist Signature: Parth Lr DPT Date: 8/25/2020   Reporting Period 7/16/2020 - 8/25/2020 Time: 8:58 AM   NOTE TO PHYSICIAN:  PLEASE COMPLETE THE ORDERS BELOW AND FAX TO   [facility]: [phone]  If you are unable to process this request in 24 hours please contact our office: [phone]    ___ I have read the above report and request that my patient continue as recommended.   ___ I have read the above report and request that my patient continue therapy with the following changes/special instructions:_________________________________________________________   ___ I have read the above report and request that my patient be discharged from therapy. Physician Signature:                                                                                            Date:                                                                     Time:                                                           Please sign and return to [facility] or you may fax the signed copy to [phone].   Thank you.

## 2020-08-25 NOTE — PROGRESS NOTES
Kasie Li,    Please disregard the previous note indicating the patient's noncompliant discharge. The pt has reached out to our office requesting to resume her POC. The pt understands that if is again in violation of our attendance policy, she will be discharged. The pt will schedule additional follow-up visits as needed following the requested treadmill test.    Please contact our office with any questions at [phone] (phone).     Thank you for your referral,    Constantine Workman DPT, CLT    8/25/2020

## 2021-06-09 NOTE — TELEPHONE ENCOUNTER
Called, left vm to make patient aware medication refilled at pharmacy. Tremfya Counseling: I discussed with the patient the risks of guselkumab including but not limited to immunosuppression, serious infections, worsening of inflammatory bowel disease and drug reactions.  The patient understands that monitoring is required including a PPD at baseline and must alert us or the primary physician if symptoms of infection or other concerning signs are noted.

## 2022-03-18 PROBLEM — G40.909 SEIZURE DISORDER (HCC): Status: ACTIVE | Noted: 2019-12-03

## 2022-03-18 PROBLEM — G62.9 NEUROPATHY: Status: ACTIVE | Noted: 2019-08-27

## 2022-03-18 PROBLEM — Z76.5 MALINGERING: Status: ACTIVE | Noted: 2019-09-01

## 2022-03-18 PROBLEM — F41.9 ANXIETY AND DEPRESSION: Status: ACTIVE | Noted: 2019-12-03

## 2022-03-18 PROBLEM — F32.A ANXIETY AND DEPRESSION: Status: ACTIVE | Noted: 2019-12-03

## 2022-03-19 PROBLEM — Z86.59 HX OF BIPOLAR DISORDER: Status: ACTIVE | Noted: 2019-12-03

## 2022-03-19 PROBLEM — K85.90 PANCREATITIS: Status: ACTIVE | Noted: 2019-12-03

## 2022-03-19 PROBLEM — R10.9 ABDOMINAL PAIN: Status: ACTIVE | Noted: 2017-01-09

## 2022-03-19 PROBLEM — F10.10 ALCOHOL ABUSE: Status: ACTIVE | Noted: 2019-12-03

## 2022-03-20 PROBLEM — K86.0 ALCOHOL-INDUCED CHRONIC PANCREATITIS (HCC): Status: ACTIVE | Noted: 2019-12-03

## 2024-02-11 NOTE — PROGRESS NOTES
Hospitalist Progress Note    NAME: Blain Collet   :  1980   MRN:  [id number]       Interim Hospital Summary: 43 y.o. female whom presented on 2017 with abdominal pain    History of pancreatitis, alcohol abuse and withdrawal, seizure due to withdrawal  Recent admission to [facility] hospital few days PTA for acute pancreatitis, and was discharged on      Assessment / Plan:  Abdominal pain   Secondary to gastritis, underlying pancreatitis treated, with advancement of diet,  Alcohol abuse    Iv hydration  protonix  CIWA protocol ( prone for withdrawals and seizures)  Banana bag  Lipase normal, repeat in am  CT abd: neg  Anticipate discharge in am if cont to improve  Will start on clears to night, if pain better    Lactic acidosis  Repeat lactic acid  UA abnormal with 4+ bacteria, will start on cipro for cystitis  Follow the cultures      Code status: full  Prophylaxis: lovenox  Recommended Disposition: tbd     Subjective:     Chief Complaint / Reason for Physician Visit  Still with belly pain, nausea better    Discussed with RN events overnight. Review of Systems:  Symptom Y/N Comments  Symptom Y/N Comments   Fever/Chills n   Chest Pain n    Poor Appetite    Edema     Cough n   Abdominal Pain y    Sputum    Joint Pain     SOB/BRADSHAW n   Pruritis/Rash     Nausea/vomit    Tolerating PT/OT     Diarrhea    Tolerating Diet     Constipation    Other       Could NOT obtain due to:      Objective:     VITALS:   Last 24hrs VS reviewed since prior progress note.  Most recent are:  Patient Vitals for the past 24 hrs:   Temp Pulse Resp BP SpO2   17 0805 98.2 °F (36.8 °C) 68 16 118/85 100 %   17 0400 - 66 14 115/87 98 %   17 0330 - 71 17 130/80 93 %   17 0200 - 79 27 132/61 94 %   17 0115 - 72 20 131/81 92 %   17 0030 - 85 15 143/87 92 %   17 2345 - 81 14 137/89 92 %   17 2315 - 94 20 (!) 147/93 94 %   17 2300 - 73 14 136/85 94 %   17 2245 - 77 16 139/88 93 %   01/08/17 2230 - 69 14 146/88 99 %   01/08/17 2215 - 74 17 143/88 94 %   01/08/17 2130 - 63 12 (!) 139/92 97 %   01/08/17 2121 - 73 15 132/82 98 %   01/08/17 2000 - 60 16 152/87 94 %   01/08/17 1917 - 80 20 - 100 %   01/08/17 1915 - - - 139/89 -   01/08/17 1705 98.3 °F (36.8 °C) 78 14 (!) 192/110 100 %     No intake or output data in the 24 hours ending 01/09/17 1006     PHYSICAL EXAM:  General: WD, WN. Alert, cooperative, no acute distress    EENT:  EOMI. Anicteric sclerae. MMM  Resp:  CTA bilaterally, no wheezing or rales. No accessory muscle use  CV:  Regular  rhythm,  No edema  GI:  Soft, Non distended, mild diffuse tenderness.  +Bowel sounds  Neurologic:  Alert and oriented X 3, normal speech,   Psych:   Good insight. Not anxious nor agitated  Skin:  No rashes. No jaundice    Reviewed most current lab test results and cultures  YES  Reviewed most current radiology test results   YES  Review and summation of old records today    NO  Reviewed patient's current orders and MAR    YES  PMH/SH reviewed - no change compared to H&P  ________________________________________________________________________  Care Plan discussed with:    Comments   Patient y    Family      RN y    Care Manager     Consultant                        Multidiciplinary team rounds were held today with , nursing, pharmacist and clinical coordinator. Patient's plan of care was discussed; medications were reviewed and discharge planning was addressed.      ________________________________________________________________________  Total NON critical care TIME:35   Minutes    Total CRITICAL CARE TIME Spent:   Minutes non procedure based      Comments   >50% of visit spent in counseling and coordination of care     ________________________________________________________________________  Joe Rios MD     Procedures: see electronic medical records for all procedures/Xrays and details which were not copied into this note but were reviewed prior to creation of Plan. LABS:  I reviewed today's most current labs and imaging studies.   Pertinent labs include:  Recent Labs      01/08/17   1824   WBC  6.0   HGB  13.3   HCT  38.1   PLT  224     Recent Labs      01/08/17   1824   NA  140   K  3.6   CL  100   CO2  22   GLU  52*   BUN  8   CREA  0.62   CA  9.1   MG  1.5*   ALB  4.0   TBILI  0.5   SGOT  73*   ALT  43       Signed: Alea Lowe MD - - -

## 2024-02-22 ENCOUNTER — HOSPITAL ENCOUNTER (EMERGENCY)
Facility: HOSPITAL | Age: 44
Discharge: HOME | End: 2024-02-22
Payer: COMMERCIAL

## 2024-02-22 VITALS
HEIGHT: 66 IN | TEMPERATURE: 97.7 F | SYSTOLIC BLOOD PRESSURE: 143 MMHG | WEIGHT: 209 LBS | DIASTOLIC BLOOD PRESSURE: 95 MMHG | BODY MASS INDEX: 33.59 KG/M2 | RESPIRATION RATE: 18 BRPM | OXYGEN SATURATION: 100 % | HEART RATE: 75 BPM

## 2024-02-22 DIAGNOSIS — F10.10 ALCOHOL ABUSE: Primary | ICD-10-CM

## 2024-02-22 DIAGNOSIS — E87.6 HYPOKALEMIA: ICD-10-CM

## 2024-02-22 LAB
ALBUMIN SERPL-MCNC: 3.3 G/DL (ref 3.4–5)
ALBUMIN/GLOB SERPL: 0.7 (ref 0.8–1.7)
ALP SERPL-CCNC: 184 U/L (ref 45–117)
ALT SERPL-CCNC: 27 U/L (ref 13–56)
ANION GAP SERPL CALC-SCNC: 4 MMOL/L (ref 3–18)
APPEARANCE UR: CLEAR
AST SERPL-CCNC: 25 U/L (ref 10–38)
BILIRUB SERPL-MCNC: 0.2 MG/DL (ref 0.2–1)
BILIRUB UR QL: NEGATIVE
BUN SERPL-MCNC: 11 MG/DL (ref 7–18)
BUN/CREAT SERPL: 18 (ref 12–20)
CALCIUM SERPL-MCNC: 8.5 MG/DL (ref 8.5–10.1)
CHLORIDE SERPL-SCNC: 106 MMOL/L (ref 100–111)
CO2 SERPL-SCNC: 27 MMOL/L (ref 21–32)
COLOR UR: YELLOW
CREAT SERPL-MCNC: 0.6 MG/DL (ref 0.6–1.3)
ERYTHROCYTE [DISTWIDTH] IN BLOOD BY AUTOMATED COUNT: 16.6 % (ref 11.6–14.5)
ETHANOL SERPL-MCNC: 7 MG/DL (ref 0–3)
GLOBULIN SER CALC-MCNC: 4.5 G/DL (ref 2–4)
GLUCOSE SERPL-MCNC: 89 MG/DL (ref 74–99)
GLUCOSE UR STRIP.AUTO-MCNC: NEGATIVE MG/DL
HCT VFR BLD AUTO: 33.5 % (ref 35–45)
HGB BLD-MCNC: 11.1 G/DL (ref 12–16)
HGB UR QL STRIP: NEGATIVE
KETONES UR QL STRIP.AUTO: NEGATIVE MG/DL
LEUKOCYTE ESTERASE UR QL STRIP.AUTO: NEGATIVE
MCH RBC QN AUTO: 29.4 PG (ref 24–34)
MCHC RBC AUTO-ENTMCNC: 33.1 G/DL (ref 31–37)
MCV RBC AUTO: 88.6 FL (ref 78–100)
NITRITE UR QL STRIP.AUTO: NEGATIVE
NRBC # BLD: 0 K/UL (ref 0–0.01)
NRBC BLD-RTO: 0 PER 100 WBC
PH UR STRIP: 6 (ref 5–8)
PLATELET # BLD AUTO: 258 K/UL (ref 135–420)
PMV BLD AUTO: 9.1 FL (ref 9.2–11.8)
POTASSIUM SERPL-SCNC: 3.1 MMOL/L (ref 3.5–5.5)
PROT SERPL-MCNC: 7.8 G/DL (ref 6.4–8.2)
PROT UR STRIP-MCNC: NEGATIVE MG/DL
RBC # BLD AUTO: 3.78 M/UL (ref 4.2–5.3)
SODIUM SERPL-SCNC: 137 MMOL/L (ref 136–145)
SP GR UR REFRACTOMETRY: 1.01 (ref 1–1.03)
UROBILINOGEN UR QL STRIP.AUTO: 0.2 EU/DL (ref 0.2–1)
WBC # BLD AUTO: 6.3 K/UL (ref 4.6–13.2)

## 2024-02-22 PROCEDURE — 81003 URINALYSIS AUTO W/O SCOPE: CPT

## 2024-02-22 PROCEDURE — 6370000000 HC RX 637 (ALT 250 FOR IP): Performed by: HEALTH CARE PROVIDER

## 2024-02-22 PROCEDURE — 80307 DRUG TEST PRSMV CHEM ANLYZR: CPT

## 2024-02-22 PROCEDURE — 82077 ASSAY SPEC XCP UR&BREATH IA: CPT

## 2024-02-22 PROCEDURE — 85027 COMPLETE CBC AUTOMATED: CPT

## 2024-02-22 PROCEDURE — 99283 EMERGENCY DEPT VISIT LOW MDM: CPT

## 2024-02-22 PROCEDURE — 80053 COMPREHEN METABOLIC PANEL: CPT

## 2024-02-22 RX ADMIN — POTASSIUM BICARBONATE 40 MEQ: 782 TABLET, EFFERVESCENT ORAL at 18:54

## 2024-02-22 ASSESSMENT — PAIN - FUNCTIONAL ASSESSMENT
PAIN_FUNCTIONAL_ASSESSMENT: NONE - DENIES PAIN
PAIN_FUNCTIONAL_ASSESSMENT: NONE - DENIES PAIN

## 2024-02-22 NOTE — DISCHARGE INSTRUCTIONS
Medically cleared for detox program.    Return to ED if any concerns for alcohol withdrawal requiring medical attention such as tremor, excessive agitation, vomiting, sweating, etc.

## 2024-02-22 NOTE — ED TRIAGE NOTES
Client requesting to be seen and medically cleared, for safe harbor. Facility employee accompaning client. Client been seem for ETOH abuse and treatment. NAD. AXOX4.  [facility] Admin on call number [phone].

## 2024-02-22 NOTE — ED PROVIDER NOTES
MG tablet  Commonly known as: TRILEPTAL     * promethazine 25 MG suppository  Commonly known as: PHENERGAN     * promethazine 25 MG tablet  Commonly known as: PHENERGAN     * QUEtiapine 25 MG tablet  Commonly known as: SEROQUEL     * QUEtiapine 100 MG tablet  Commonly known as: SEROQUEL     scopolamine transdermal patch  Commonly known as: TRANSDERM-SCOP     Spiriva Respimat 2.5 MCG/ACT Aers inhaler  Generic drug: tiotropium     SUMAtriptan 100 MG tablet  Commonly known as: IMITREX     vitamin D 1.25 MG (00798 UT) Caps capsule  Commonly known as: ERGOCALCIFEROL           * This list has 4 medication(s) that are the same as other medications prescribed for you. Read the directions carefully, and ask your doctor or other care provider to review them with you.                  Follow-ups:  [facility] EMERGENCY DEPT  [address] [place] [id number] [phone]    If symptoms worsen    Mike Milan MD  [address] [place] [id number] [phone]    Schedule an appointment as soon as possible for a visit in 1 week  Repeat metabolic panel            Dao Graf PA-C  02/23/24 4750

## 2024-02-23 LAB
AMPHET UR QL SCN: NEGATIVE
BARBITURATES UR QL SCN: NEGATIVE
BENZODIAZ UR QL: POSITIVE
CANNABINOIDS UR QL SCN: POSITIVE
COCAINE UR QL SCN: NEGATIVE
Lab: ABNORMAL
METHADONE UR QL: NEGATIVE
OPIATES UR QL: NEGATIVE
PCP UR QL: NEGATIVE

## 2024-02-23 ASSESSMENT — ENCOUNTER SYMPTOMS
SHORTNESS OF BREATH: 0
ABDOMINAL PAIN: 0
COUGH: 0
DIARRHEA: 0
VOMITING: 0
NAUSEA: 0
CHEST TIGHTNESS: 0

## 2024-03-21 NOTE — DISCHARGE INSTRUCTIONS
Patient Education   Stay hydrated by drinking plenty of fluids and eating. Take Pepcid to help with your abdominal pain and Phenergan for any nausea. Follow-up with [facility] if you want Alcohol Detox. Indigestion (Dyspepsia or Heartburn): Care Instructions  Your Care Instructions  Sometimes it can be hard to pinpoint the cause of indigestion. (It is also called dyspepsia or heartburn.) Most cases of an upset stomach with bloating, burning, burping, and nausea are minor and go away within several hours. Home treatment and over-the-counter medicine often are able to control symptoms. But if you take medicine to relieve your indigestion without making diet and lifestyle changes, your symptoms are likely to return again and again. If you get indigestion often, it may be a sign of a more serious medical problem. Be sure to follow up with your doctor, who may want to do tests to be sure of the cause of your indigestion. Follow-up care is a key part of your treatment and safety. Be sure to make and go to all appointments, and call your doctor if you are having problems. It's also a good idea to know your test results and keep a list of the medicines you take. How can you care for yourself at home? · Your doctor may recommend over-the-counter medicine. For mild or occasional indigestion, antacids such as Gaviscon, Mylanta, Maalox, or Tums, may help. Be safe with medicines. Be careful when you take over-the-counter antacid medicines. Many of these medicines have aspirin in them. Read the label to make sure that you are not taking more than the recommended dose. Too much aspirin can be harmful. · Your doctor also may recommend over-the-counter acid reducers, such as Pepcid AC, Tagamet HB, Zantac 75, or Prilosec. Read and follow all instructions on the label. If you use these medicines often, talk with your doctor. · Change your eating habits.   ? It's best to eat several small meals instead of two or three large Subjective   Patient ID: Viki Kumar is a 70 y.o. female who presents for follow up evaluation of left shoulder pain.    Presented to ED yesterday for concerns of inability to use left shoulder associated with pain. Xray notable for arthritis. Was treated with IM toradol injection and discharged home with lidocaine patches, cyclobenzaprine and meloxicam.    Pain rated 4/10 today in office on current therapy.     Review of Systems   Constitutional: Negative.    HENT: Negative.     Eyes: Negative.    Respiratory: Negative.     Cardiovascular: Negative.    Gastrointestinal: Negative.    Endocrine: Negative.    Genitourinary: Negative.    Musculoskeletal: Negative.    Skin: Negative.    Neurological: Negative.    Hematological: Negative.    Psychiatric/Behavioral: Negative.          Current Outpatient Medications   Medication Sig Dispense Refill    albuterol 2.5 mg /3 mL (0.083 %) nebulizer solution Take 3 mL (2.5 mg) by nebulization 4 times a day as needed for wheezing or shortness of breath. 100 mL 11    ascorbic acid (VITAMIN C ORAL) Take by mouth.      azelastine (Astelin) 137 mcg (0.1 %) nasal spray SPRAY 1 SPRAY INTO EACH NOSTRIL IN THE MORNING AND BEFORE BEDTIME AS DIRECTED 30 mL 5    CALCIUM CITRATE ORAL Take by mouth.      cyclobenzaprine (Flexeril) 10 mg tablet Take 1 tablet (10 mg) by mouth 3 times a day as needed for muscle spasms. 17 tablet 0    diclofenac (Voltaren) 0.1 % ophthalmic solution Administer into both eyes 4 times a day.      docosahexaenoic acid/epa (FISH OIL ORAL) Take by mouth.      fluticasone propion-salmeteroL (Wixela Inhub) 500-50 mcg/dose diskus inhaler Inhale 1 puff 2 times a day. Rinse mouth with water after use to reduce aftertaste and incidence of candidiasis. Do not swallow. 60 each 11    ibuprofen 200 mg tablet Take 1 tablet (200 mg) by mouth every 2 hours if needed.      lidocaine (Lidoderm) 5 % patch Place 1 patch over 12 hours on the skin once daily. Remove & discard  meals.  ? After you eat, wait 2 to 3 hours before you lie down. ? Chocolate, mint, and alcohol can make GERD worse. ? Spicy foods, foods that have a lot of acid (like tomatoes and oranges), and coffee can make GERD symptoms worse in some people. If your symptoms are worse after you eat a certain food, you may want to stop eating that food to see if your symptoms get better. · Do not smoke or chew tobacco. Smoking can make GERD worse. If you need help quitting, talk to your doctor about stop-smoking programs and medicines. These can increase your chances of quitting for good. · If you have GERD symptoms at night, raise the head of your bed 6 to 8 inches. You can do this by putting the frame on blocks or placing a foam wedge under the head of your mattress. (Adding extra pillows does not work.)  · Do not wear tight clothing around your middle. · Lose weight if you need to. Losing just 5 to 10 pounds can help. · Do not take anti-inflammatory medicines, such as aspirin, ibuprofen (Advil, Motrin), or naproxen (Aleve). These can irritate the stomach. If you need a pain medicine, try acetaminophen (Tylenol), which does not cause stomach upset. When should you call for help? Call your doctor now or seek immediate medical care if:    · You have new or worse belly pain.     · You are vomiting.    Watch closely for changes in your health, and be sure to contact your doctor if:    · You have new or worse symptoms of indigestion.     · You have trouble or pain swallowing.     · You are losing weight.     · You do not get better as expected. Where can you learn more? Go to http://willy-yenny.info/. Enter U697 in the search box to learn more about \"Indigestion (Dyspepsia or Heartburn): Care Instructions. \"  Current as of: March 27, 2018  Content Version: 11.9  © 9333-4810 [employer].  Care instructions adapted under license by [employer] (which disclaims liability or warranty patch within 12 hours or as directed by MD. 5 patch 0    MAGNESIUM ORAL Take 200 mg by mouth.      meloxicam (Mobic) 15 mg tablet Take 1 tablet (15 mg) by mouth once daily. 10 tablet 0    MILK THISTLE ORAL Take 1,000 mg by mouth.      montelukast (Singulair) 10 mg tablet Take 1 tablet (10 mg) by mouth once daily at bedtime. 90 tablet 3    moxifloxacin (Vigamox) 0.5 % ophthalmic solution Administer 1 drop into both eyes 3 times a day.      prednisoLONE acetate (Pred-Forte) 1 % ophthalmic suspension Administer 1 drop into both eyes 4 times a day.      rosuvastatin (Crestor) 10 mg tablet Take 1 tablet (10 mg) by mouth once daily. 90 tablet 3    traZODone (Desyrel) 50 mg tablet TAKE 1 TABLET BY MOUTH AS NEEDED AT BEDTIME FOR SLEEP. 30 tablet 5    turmeric 400 mg capsule Take by mouth.      VITAMIN B COMPLEX ORAL Take by mouth.      ZINC ORAL Take 100 mg by mouth.      albuterol 90 mcg/actuation inhaler Inhale 1 puff every 4 hours if needed for wheezing or shortness of breath. 18 g 11    ALPRAZolam (Xanax) 0.5 mg tablet Take 1 tablet (0.5 mg) by mouth once daily as needed for anxiety. (Patient not taking: Reported on 3/21/2024) 30 tablet 0    ARIPiprazole (Abilify) 2 mg tablet TAKE 1 TABLET BY MOUTH ONCE DAILY. (Patient not taking: Reported on 3/21/2024) 90 tablet 1    biotin 1 mg capsule Take by mouth.      busPIRone (Buspar) 7.5 mg tablet Take 1 tablet (7.5 mg) by mouth.      denosumab (Prolia) 60 mg/mL syringe Inject 1 mL (60 mg) under the skin 1 time for 1 dose. 1 mL 0    ferrous gluconate 256 mg (28 mg iron) tablet Take 1 tablet (28 mg) by mouth.      hydrOXYzine HCL (Atarax) 50 mg tablet TAKE 1 TABLET BY MOUTH ONCE DAILY AT BEDTIME. (Patient not taking: Reported on 3/21/2024) 30 tablet 5    sertraline (Zoloft) 100 mg tablet Take 1 tablet (100 mg) by mouth once daily. 90 tablet 3    tiotropium (Spiriva with HandiHaler) 18 mcg inhalation capsule Place 1 capsule (18 mcg) into inhaler and inhale once daily. (Patient  "not taking: Reported on 3/21/2024) 30 capsule 11     No current facility-administered medications for this visit.     Past Surgical History:   Procedure Laterality Date    OTHER SURGICAL HISTORY  2018    Hernia repair    OTHER SURGICAL HISTORY  2018    Tonsillectomy    OTHER SURGICAL HISTORY  2018    Hip surgery    OTHER SURGICAL HISTORY  2018    Bone transplantation autograft     Family History   Problem Relation Name Age of Onset    Other (SMALL CELL CARCINOMA) Mother      Emphysema Mother      Emphysema Father      Heart failure Father      Emphysema Brother Mayo     Lung cancer Brother Mayo     Coronary artery disease Brother Mayo     Other (cardiac bypass) Brother Mayo     Other (shoulder replacement) Brother Timoteo     Gout Brother Timoteo     Memory loss Brother Manjit     Gout Brother Manjit     Accidental death Brother Amilcar       Social History     Tobacco Use    Smoking status: Former     Packs/day: 1.00     Years: 50.00     Additional pack years: 0.00     Total pack years: 50.00     Types: Cigarettes     Quit date: 10/1/2022     Years since quittin.4     Passive exposure: Past    Smokeless tobacco: Never   Vaping Use    Vaping Use: Former   Substance Use Topics    Alcohol use: Not Currently     Comment: occassionally    Drug use: Never     Comment: CBD gummies        Objective     Visit Vitals  /90 (BP Location: Right arm, Patient Position: Sitting, BP Cuff Size: Adult)   Pulse 76   Temp 36.4 °C (97.6 °F)   Resp 20   Ht 1.626 m (5' 4\")   Wt 82.5 kg (181 lb 14.4 oz)   SpO2 95%   BMI 31.22 kg/m²   OB Status Postmenopausal   Smoking Status Former   BSA 1.93 m²        Physical Exam  Musculoskeletal:         General: Tenderness present. No signs of injury.      Left shoulder: Bony tenderness present. No swelling, tenderness or crepitus. Decreased range of motion. Decreased strength.         Assessment/Plan   Problem List Items Addressed This Visit       Generalized anxiety " for this information). If you have questions about a medical condition or this instruction, always ask your healthcare professional. [address] any warranty or liability for your use of this information. Patient Education        Abdominal Pain: Care Instructions  Your Care Instructions    Abdominal pain has many possible causes. Some aren't serious and get better on their own in a few days. Others need more testing and treatment. If your pain continues or gets worse, you need to be rechecked and may need more tests to find out what is wrong. You may need surgery to correct the problem. Don't ignore new symptoms, such as fever, nausea and vomiting, urination problems, pain that gets worse, and dizziness. These may be signs of a more serious problem. Your doctor may have recommended a follow-up visit in the next 8 to 12 hours. If you are not getting better, you may need more tests or treatment. The doctor has checked you carefully, but problems can develop later. If you notice any problems or new symptoms, get medical treatment right away. Follow-up care is a key part of your treatment and safety. Be sure to make and go to all appointments, and call your doctor if you are having problems. It's also a good idea to know your test results and keep a list of the medicines you take. How can you care for yourself at home? · Rest until you feel better. · To prevent dehydration, drink plenty of fluids, enough so that your urine is light yellow or clear like water. Choose water and other caffeine-free clear liquids until you feel better. If you have kidney, heart, or liver disease and have to limit fluids, talk with your doctor before you increase the amount of fluids you drink. · If your stomach is upset, eat mild foods, such as rice, dry toast or crackers, bananas, and applesauce. Try eating several small meals instead of two or three large ones.   · Wait until 48 hours after all disorder    Relevant Medications    sertraline (Zoloft) 100 mg tablet    Acute pain of left shoulder     Acute left shoulder pain, prompted to present to ED for evaluation  Was given toradol injection, lidocaine patches, meloxicam and cyclobenzaprine for management  Rating left shoulder pain as 4/10 dull with intermittent sharp pain, worse with motion.  strength and push pulls intact.     Refer to ortho surgery for evaluation          Other Visit Diagnoses       Arthropathy of left shoulder    -  Primary    Relevant Orders    Referral to Orthopaedic Surgery            All pertinent lab work and results were reviewed with patient.     Follow up with me in April as scheduled    Jeanna French, CLAY-CNS   symptoms have gone away before you have spicy foods, alcohol, and drinks that contain caffeine. · Do not eat foods that are high in fat. · Avoid anti-inflammatory medicines such as aspirin, ibuprofen (Advil, Motrin), and naproxen (Aleve). These can cause stomach upset. Talk to your doctor if you take daily aspirin for another health problem. When should you call for help? Call 911 anytime you think you may need emergency care. For example, call if:    · You passed out (lost consciousness).     · You pass maroon or very bloody stools.     · You vomit blood or what looks like coffee grounds.     · You have new, severe belly pain.    Call your doctor now or seek immediate medical care if:    · Your pain gets worse, especially if it becomes focused in one area of your belly.     · You have a new or higher fever.     · Your stools are black and look like tar, or they have streaks of blood.     · You have unexpected vaginal bleeding.     · You have symptoms of a urinary tract infection. These may include:  ? Pain when you urinate. ? Urinating more often than usual.  ? Blood in your urine.     · You are dizzy or lightheaded, or you feel like you may faint.    Watch closely for changes in your health, and be sure to contact your doctor if:    · You are not getting better after 1 day (24 hours). Where can you learn more? Go to http://willy-yenny.info/. Enter D103 in the search box to learn more about \"Abdominal Pain: Care Instructions. \"  Current as of: September 23, 2018  Content Version: 11.9  © 1693-4331 [employer]. Care instructions adapted under license by [employer] (which disclaims liability or warranty for this information). If you have questions about a medical condition or this instruction, always ask your healthcare professional. [address] any warranty or liability for your use of this information.

## 2024-08-08 ENCOUNTER — OFFICE VISIT (OUTPATIENT)
Age: 44
End: 2024-08-08
Payer: COMMERCIAL

## 2024-08-08 VITALS — WEIGHT: 226 LBS | HEIGHT: 65 IN | BODY MASS INDEX: 37.65 KG/M2

## 2024-08-08 DIAGNOSIS — M53.3 SACROILIAC JOINT PAIN: ICD-10-CM

## 2024-08-08 DIAGNOSIS — G89.29 CHRONIC BILATERAL LOW BACK PAIN WITHOUT SCIATICA: Primary | ICD-10-CM

## 2024-08-08 DIAGNOSIS — M54.50 CHRONIC BILATERAL LOW BACK PAIN WITHOUT SCIATICA: Primary | ICD-10-CM

## 2024-08-08 PROCEDURE — 99214 OFFICE O/P EST MOD 30 MIN: CPT | Performed by: PHYSICAL MEDICINE & REHABILITATION

## 2024-08-08 RX ORDER — UBROGEPANT 50 MG/1
TABLET ORAL
COMMUNITY
Start: 2023-11-05

## 2024-08-08 RX ORDER — GABAPENTIN 300 MG/1
300 CAPSULE ORAL 3 TIMES DAILY
COMMUNITY
Start: 2024-07-15

## 2024-08-08 RX ORDER — AMITRIPTYLINE HYDROCHLORIDE 25 MG/1
1 TABLET, FILM COATED ORAL
COMMUNITY

## 2024-08-08 RX ORDER — TRAZODONE HYDROCHLORIDE 50 MG/1
1 TABLET ORAL
COMMUNITY

## 2024-08-08 RX ORDER — OXYCODONE HYDROCHLORIDE 5 MG/1
5 TABLET ORAL EVERY 6 HOURS PRN
COMMUNITY
Start: 2024-08-05

## 2024-08-08 RX ORDER — CLINDAMYCIN HYDROCHLORIDE 300 MG/1
300 CAPSULE ORAL 4 TIMES DAILY
COMMUNITY
Start: 2024-07-26

## 2024-08-08 RX ORDER — CYCLOBENZAPRINE HCL 10 MG
10 TABLET ORAL 2 TIMES DAILY PRN
COMMUNITY

## 2024-08-08 RX ORDER — BUSPIRONE HYDROCHLORIDE 15 MG/1
15 TABLET ORAL 2 TIMES DAILY
COMMUNITY
Start: 2024-07-15

## 2024-08-08 SDOH — HEALTH STABILITY: PHYSICAL HEALTH: ON AVERAGE, HOW MANY DAYS PER WEEK DO YOU ENGAGE IN MODERATE TO STRENUOUS EXERCISE (LIKE A BRISK WALK)?: 2 DAYS

## 2024-08-08 SDOH — HEALTH STABILITY: PHYSICAL HEALTH: ON AVERAGE, HOW MANY MINUTES DO YOU ENGAGE IN EXERCISE AT THIS LEVEL?: 10 MIN

## 2024-08-08 NOTE — PROGRESS NOTES
Nadineor JEFFERY Dumont presents today for   Chief Complaint   Patient presents with    Hip Pain     Left hip    Back Pain     Lumbar spine       Is someone accompanying this pt? no    Is the patient using any DME equipment during OV? no    Depression Screening:       No data to display                Learning Assessment:  Failed to redirect to the Timeline version of the [employer] SmartLink.    Abuse Screening:       No data to display                Fall Risk  Failed to redirect to the Timeline version of the [employer] SmartLink.    OPIOID RISK TOOL  Failed to redirect to the Timeline version of the [employer] SmartLink.    Coordination of Care:  1. Have you been to the ER, urgent care clinic since your last visit? yes  Hospitalized since your last visit? Yes [facility]    2. Have you seen or consulted any other health care providers outside of the [facility] since your last visit? no Include any pap smears or colon screening. no

## 2024-08-08 NOTE — PROGRESS NOTES
[facility]  [facility], [address]  [place] [address]  Phone: [phone]  Fax: [phone]      Patient: Brandon Dumont                                                                              MRN: [id number]        YOB: 1980          AGE: 43 y.o.             PCP: Mike Milan MD  Date:  08/08/24    Reason for Consultation: Hip Pain (Left hip) and Back Pain (Lumbar spine)      HPI:  Brandon Dumont is a 43 y.o. female with relevant PMH of chronic pancreatitis, peripheral neuropathy and split fiber myopathy,  who presents with low back pain and sacroiliac joint pain and left hip pain.  She has previously been seen by Dr. Sam and and tried several injections-(see below). She was recently hospitalized for submandibular gland infection 8/2/2024 and was discharged with antibiotics and steroids     Neurologist Carli Garcia   Neurologic symptoms: No numbness, tingling, weakness, bowel or bladder changes.  No recent falls      Location: The pain is located in the low back, posterior hip    Radiation: The pain does radiate bilateral lateral hips .    Pain Score: Currently: 8/10    Quality: Pain is of a aching, stiff, pulling quality.    Aggravating: Pain is exacerbated by walking, sitting, and standing  Alleviating: The pain is alleviated by nothing    Prior Treatments:  Physical therapy: about to start  Injections:Yes  10/23/2023- left GT bursa injection- several weeks of relief   --9/12/23--bilat SIJ provided very strong positive left diagnostic response without durability. Provided mediocre right diagnostic response with excellent durability.  -2/9/2021-right L4 and L5 transforaminal KERRIE provided greater than 50% pain relief with resolution of right lower extremity weakness for approximately 3-1/2 months before return of symptoms.   Surgery:No  Previous Medications: flexeril 10mg , topamax   Current Medications: elevail

## 2024-08-21 ENCOUNTER — TELEPHONE (OUTPATIENT)
Age: 44
End: 2024-08-21

## 2024-08-21 DIAGNOSIS — G89.29 CHRONIC BILATERAL LOW BACK PAIN WITHOUT SCIATICA: Primary | ICD-10-CM

## 2024-08-21 DIAGNOSIS — M54.50 CHRONIC BILATERAL LOW BACK PAIN WITHOUT SCIATICA: Primary | ICD-10-CM

## 2024-08-21 DIAGNOSIS — M53.3 SACROILIAC JOINT PAIN: ICD-10-CM

## 2024-08-21 NOTE — TELEPHONE ENCOUNTER
Patient called to ask if we could refer her to TAWNYA Sam. It is the doctor she was going to previously and he is establishing care there at [facility], but they are requiring a referral from us to schedule with them.    Dr. Kenny BOWLING/KATIE in the [facility]  Tel. [phone]    Please review and advise patient if possible,  [phone]

## 2025-04-30 NOTE — ED TRIAGE NOTES
Patient arrived with nausea,vomiting and abdominal pain. Now she is vomiting blood which she describes as purplish. Patient was just seen here Friday and was told she had a mass on her kidney. Health Care Proxy (HCP)

## 2025-06-12 ENCOUNTER — TELEPHONE (OUTPATIENT)
Age: 45
End: 2025-06-12

## 2025-06-12 ENCOUNTER — OFFICE VISIT (OUTPATIENT)
Age: 45
End: 2025-06-12

## 2025-06-12 VITALS — WEIGHT: 214 LBS | BODY MASS INDEX: 35.65 KG/M2 | HEIGHT: 65 IN

## 2025-06-12 DIAGNOSIS — M25.50 POLYARTHRALGIA: ICD-10-CM

## 2025-06-12 DIAGNOSIS — M17.11 PRIMARY OSTEOARTHRITIS OF RIGHT KNEE: Primary | ICD-10-CM

## 2025-06-12 DIAGNOSIS — M16.12 PRIMARY OSTEOARTHRITIS OF LEFT HIP: ICD-10-CM

## 2025-06-12 RX ORDER — TRAMADOL HYDROCHLORIDE 50 MG/1
50 TABLET ORAL EVERY 8 HOURS PRN
Qty: 20 TABLET | Refills: 0 | Status: SHIPPED | OUTPATIENT
Start: 2025-06-12 | End: 2025-07-12

## 2025-06-12 NOTE — PROGRESS NOTES
Financial Resource Strain: Not on file   Food Insecurity: Not on file   Transportation Needs: Not on file   Physical Activity: Insufficiently Active (8/8/2024)    Exercise Vital Sign     Days of Exercise per Week: 2 days     Minutes of Exercise per Session: 10 min   Stress: Not on file   Social Connections: Not on file   Intimate Partner Violence: Not on file   Housing Stability: Not on file       REVIEW OF SYSTEMS:      Negative except for that stated above.     [unfilled]      Prescription medication management discussed with patient.     Electronically signed by: Ekaterina Ortiz PA-C    Note: This note was completed using voice recognition software.  Any typographical/name errors or mistakes are unintentional.

## 2025-06-12 NOTE — TELEPHONE ENCOUNTER
Pt called because her RX has not been sent to the pharmacy from today's appt and pt does not want to run out of meds.    RX:  Tramadol    Pharmacy:  [id number] - [place] - [address] - P [phone] - F [phone] [[id number]]

## 2025-06-16 ENCOUNTER — CLINICAL DOCUMENTATION (OUTPATIENT)
Age: 45
End: 2025-06-16

## 2025-07-01 DIAGNOSIS — M16.12 PRIMARY OSTEOARTHRITIS OF LEFT HIP: ICD-10-CM

## 2025-07-01 RX ORDER — TRAMADOL HYDROCHLORIDE 50 MG/1
50 TABLET ORAL EVERY 8 HOURS PRN
Qty: 20 TABLET | Refills: 0 | OUTPATIENT
Start: 2025-07-01 | End: 2025-07-31

## 2025-07-03 DIAGNOSIS — M16.12 PRIMARY OSTEOARTHRITIS OF LEFT HIP: ICD-10-CM

## 2025-07-07 RX ORDER — TRAMADOL HYDROCHLORIDE 50 MG/1
50 TABLET ORAL EVERY 8 HOURS PRN
Qty: 20 TABLET | Refills: 0 | OUTPATIENT
Start: 2025-07-07 | End: 2025-08-06

## 2025-07-22 DIAGNOSIS — M16.12 PRIMARY OSTEOARTHRITIS OF LEFT HIP: ICD-10-CM

## (undated) DEVICE — SYRINGE MED 25GA 3ML L5/8IN SUBQ PLAS W/ DETACH NDL SFTY

## (undated) DEVICE — CATHETER SUCT TR FL TIP 14FR W/ O CTRL

## (undated) DEVICE — ENDOSCOPY PUMP TUBING/ CAP SET: Brand: ERBE

## (undated) DEVICE — AIRLIFE™ NASAL OXYGEN CANNULA CURVED, FLARED TIP WITH 14 FOOT (4.3 M) CRUSH-RESISTANT TUBING, OVER-THE-EAR STYLE: Brand: AIRLIFE™

## (undated) DEVICE — GAUZE SPONGES,16 PLY: Brand: CURITY

## (undated) DEVICE — BITE BLOCK ENDOSCP UNIV AD 6 TO 9.4 MM

## (undated) DEVICE — MEDI-VAC NON-CONDUCTIVE SUCTION TUBING: Brand: CARDINAL HEALTH

## (undated) DEVICE — SOLUTION IRRIG 1000ML H2O STRL BLT

## (undated) DEVICE — BASIN EMESIS 500CC ROSE 250/CS 60/PLT: Brand: MEDEGEN MEDICAL PRODUCTS, LLC

## (undated) DEVICE — FLUFF AND POLYMER UNDERPAD,EXTRA HEAVY: Brand: WINGS

## (undated) DEVICE — MEDI-VAC SUCTION HIGH CAPACITY: Brand: CARDINAL HEALTH

## (undated) DEVICE — FCPS RAD JAW 4LC 240CM W/NDL -- BX/20 RADIAL JAW 4

## (undated) DEVICE — (D)GLOVE EXAM LG NITRL NS -- DISC BY MFR NO SUB

## (undated) DEVICE — FLEX ADVANTAGE 3000CC: Brand: FLEX ADVANTAGE

## (undated) DEVICE — STERILE POLYISOPRENE POWDER-FREE SURGICAL GLOVES: Brand: PROTEXIS

## (undated) DEVICE — SYR 50ML SLIP TIP NSAF LF STRL --